# Patient Record
Sex: MALE | Race: WHITE | NOT HISPANIC OR LATINO | Employment: UNEMPLOYED | URBAN - METROPOLITAN AREA
[De-identification: names, ages, dates, MRNs, and addresses within clinical notes are randomized per-mention and may not be internally consistent; named-entity substitution may affect disease eponyms.]

---

## 2024-01-23 ENCOUNTER — OFFICE VISIT (OUTPATIENT)
Dept: UROLOGY | Facility: AMBULATORY SURGERY CENTER | Age: 59
End: 2024-01-23
Payer: COMMERCIAL

## 2024-01-23 VITALS
SYSTOLIC BLOOD PRESSURE: 142 MMHG | BODY MASS INDEX: 28.63 KG/M2 | OXYGEN SATURATION: 98 % | WEIGHT: 200 LBS | HEIGHT: 70 IN | RESPIRATION RATE: 18 BRPM | DIASTOLIC BLOOD PRESSURE: 80 MMHG | HEART RATE: 74 BPM

## 2024-01-23 DIAGNOSIS — N40.1 BENIGN PROSTATIC HYPERPLASIA WITH NOCTURIA: ICD-10-CM

## 2024-01-23 DIAGNOSIS — R35.1 BENIGN PROSTATIC HYPERPLASIA WITH NOCTURIA: ICD-10-CM

## 2024-01-23 DIAGNOSIS — R31.29 MICROSCOPIC HEMATURIA: ICD-10-CM

## 2024-01-23 DIAGNOSIS — Z12.5 SCREENING FOR PROSTATE CANCER: ICD-10-CM

## 2024-01-23 DIAGNOSIS — R35.0 URINARY FREQUENCY: Primary | ICD-10-CM

## 2024-01-23 LAB
BACTERIA UR QL AUTO: ABNORMAL /HPF
BILIRUB UR QL STRIP: NEGATIVE
CLARITY UR: CLEAR
COLOR UR: YELLOW
GLUCOSE UR STRIP-MCNC: NEGATIVE MG/DL
HGB UR QL STRIP.AUTO: NEGATIVE
HYALINE CASTS #/AREA URNS LPF: ABNORMAL /LPF
KETONES UR STRIP-MCNC: NEGATIVE MG/DL
LEUKOCYTE ESTERASE UR QL STRIP: NEGATIVE
MUCOUS THREADS UR QL AUTO: ABNORMAL
NITRITE UR QL STRIP: NEGATIVE
NON-SQ EPI CELLS URNS QL MICRO: ABNORMAL /HPF
PH UR STRIP.AUTO: 6 [PH]
POST-VOID RESIDUAL VOLUME, ML POC: 134 ML
PROT UR STRIP-MCNC: ABNORMAL MG/DL
RBC #/AREA URNS AUTO: ABNORMAL /HPF
SL AMB  POCT GLUCOSE, UA: NORMAL
SL AMB LEUKOCYTE ESTERASE,UA: NORMAL
SL AMB POCT BILIRUBIN,UA: NORMAL
SL AMB POCT BLOOD,UA: NORMAL
SL AMB POCT CLARITY,UA: CLEAR
SL AMB POCT COLOR,UA: YELLOW
SL AMB POCT KETONES,UA: NORMAL
SL AMB POCT NITRITE,UA: NORMAL
SL AMB POCT PH,UA: 5
SL AMB POCT SPECIFIC GRAVITY,UA: 1.01
SL AMB POCT URINE PROTEIN: NORMAL
SL AMB POCT UROBILINOGEN: 0.2
SP GR UR STRIP.AUTO: 1.02 (ref 1–1.03)
UROBILINOGEN UR STRIP-ACNC: <2 MG/DL
WBC #/AREA URNS AUTO: ABNORMAL /HPF

## 2024-01-23 PROCEDURE — 81002 URINALYSIS NONAUTO W/O SCOPE: CPT | Performed by: UROLOGY

## 2024-01-23 PROCEDURE — 99204 OFFICE O/P NEW MOD 45 MIN: CPT | Performed by: UROLOGY

## 2024-01-23 PROCEDURE — 51798 US URINE CAPACITY MEASURE: CPT | Performed by: UROLOGY

## 2024-01-23 PROCEDURE — 81001 URINALYSIS AUTO W/SCOPE: CPT | Performed by: UROLOGY

## 2024-01-23 RX ORDER — TAMSULOSIN HYDROCHLORIDE 0.4 MG/1
0.4 CAPSULE ORAL
Qty: 90 CAPSULE | Refills: 3 | Status: SHIPPED | OUTPATIENT
Start: 2024-01-23

## 2024-01-23 RX ORDER — LISINOPRIL 20 MG/1
20 TABLET ORAL DAILY
COMMUNITY
Start: 2023-12-18
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
## 2024-02-06 ENCOUNTER — TELEPHONE (OUTPATIENT)
Age: 59
End: 2024-02-06

## 2024-02-06 ENCOUNTER — HOSPITAL ENCOUNTER (OUTPATIENT)
Dept: ULTRASOUND IMAGING | Facility: HOSPITAL | Age: 59
Discharge: HOME | End: 2024-02-06
Attending: UROLOGY
Payer: COMMERCIAL

## 2024-02-06 DIAGNOSIS — I72.3 ILIAC ARTERY ANEURYSM (HCC): ICD-10-CM

## 2024-02-06 DIAGNOSIS — R31.29 MICROSCOPIC HEMATURIA: ICD-10-CM

## 2024-02-06 DIAGNOSIS — I71.40 ABDOMINAL AORTIC ANEURYSM (AAA) WITHOUT RUPTURE, UNSPECIFIED PART (HCC): Primary | ICD-10-CM

## 2024-02-06 PROCEDURE — 76770 US EXAM ABDO BACK WALL COMP: CPT

## 2024-02-06 NOTE — TELEPHONE ENCOUNTER
renal/bladder US reviewed. fortunately urologic results look pretty good. no stones, empties bladder well, some simple renal cysts. he should keep the f/u with [facility] for the bladder and prostate workup.    incidental and significant finding of abnormal blood vessels- aortic aneurysm adn iliac aneurysm he MUST notify his PCP and be followed for these. The pcp actually saw him yesterday ([facility]) but he is not in epic for me to send him results direct. please fax to pcp. as a second step i have referred pt to vascular surgery in [facility] for opinion. this may be something pt knows about already, but it could also be the first time, since there are no comparisons in [facility] system. thank you

## 2024-02-07 NOTE — TELEPHONE ENCOUNTER
LM for patient to call the office back about imaging.     Report faxed to PCP office as noted by Christiane.

## 2024-02-08 NOTE — TELEPHONE ENCOUNTER
Called and spoke with patient. Informed on AP's note and the importance of following up with PCP and/or vascular as he states he was never informed of this information before.

## 2024-02-08 NOTE — TELEPHONE ENCOUNTER
Patient called back stating that he received a phone call around 4 PM today asking that he contact the office. I don't see an encounter for this in the patient's chart.    Please review and return the patient's call.    Call back [phone]

## 2024-02-13 ENCOUNTER — TELEPHONE (OUTPATIENT)
Dept: UROLOGY | Facility: AMBULATORY SURGERY CENTER | Age: 59
End: 2024-02-13

## 2024-02-13 DIAGNOSIS — I71.40 ABDOMINAL AORTIC ANEURYSM (AAA), UNSPECIFIED PART, UNSPECIFIED WHETHER RUPTURED (HCC): Primary | ICD-10-CM

## 2024-02-14 ENCOUNTER — APPOINTMENT (OUTPATIENT)
Dept: LAB | Facility: HOSPITAL | Age: 59
End: 2024-02-14
Payer: COMMERCIAL

## 2024-02-14 ENCOUNTER — TELEPHONE (OUTPATIENT)
Dept: VASCULAR SURGERY | Facility: CLINIC | Age: 59
End: 2024-02-14

## 2024-02-14 DIAGNOSIS — Z12.5 SCREENING FOR PROSTATE CANCER: ICD-10-CM

## 2024-02-14 DIAGNOSIS — I71.40 ABDOMINAL AORTIC ANEURYSM (AAA) WITHOUT RUPTURE, UNSPECIFIED PART (HCC): Primary | ICD-10-CM

## 2024-02-14 DIAGNOSIS — I71.40 ABDOMINAL AORTIC ANEURYSM (AAA) WITHOUT RUPTURE, UNSPECIFIED PART (HCC): ICD-10-CM

## 2024-02-14 LAB
ANION GAP SERPL CALCULATED.3IONS-SCNC: 6 MMOL/L
BUN SERPL-MCNC: 24 MG/DL (ref 5–25)
CALCIUM SERPL-MCNC: 9.8 MG/DL (ref 8.4–10.2)
CHLORIDE SERPL-SCNC: 107 MMOL/L (ref 96–108)
CO2 SERPL-SCNC: 28 MMOL/L (ref 21–32)
CREAT SERPL-MCNC: 2.05 MG/DL (ref 0.6–1.3)
GFR SERPL CREATININE-BSD FRML MDRD: 34 ML/MIN/1.73SQ M
GLUCOSE SERPL-MCNC: 89 MG/DL (ref 65–140)
POTASSIUM SERPL-SCNC: 4.3 MMOL/L (ref 3.5–5.3)
PSA SERPL-MCNC: 3.35 NG/ML (ref 0–4)
SODIUM SERPL-SCNC: 141 MMOL/L (ref 135–147)

## 2024-02-14 PROCEDURE — 80048 BASIC METABOLIC PNL TOTAL CA: CPT

## 2024-02-14 PROCEDURE — 36415 COLL VENOUS BLD VENIPUNCTURE: CPT

## 2024-02-14 PROCEDURE — G0103 PSA SCREENING: HCPCS

## 2024-02-14 NOTE — TELEPHONE ENCOUNTER
CTA scheduled for 2/17 9 a.m. in [place].  Follow up office visit was already scheduled for 2/20 with Janet in [place].  Spoke with patient and he is aware

## 2024-02-14 NOTE — TELEPHONE ENCOUNTER
----- Message from Unique Wilson MD sent at 2/12/2024  5:47 PM EST -----  Hi Vascular Ladies,    Dr. Sheikh is going to place a vascular referral for this patient.    Would you please get him scheduled for a CTA chest/abd/pelv w/ IV contrast prior to an office visit with me?    Thanks,    nUique  ----- Message -----  From: Carlos Sheikh MD  Sent: 2/7/2024  10:12 AM EST  To: Unique Wilson MD    U/S for microscopic hematuria with incidental 7+ cm AAA.  Thank you.    FT    ----- Message -----  From: Interface, Radiology Results In  Sent: 2/6/2024  12:56 PM EST  To: Carlos Sheikh MD

## 2024-02-14 NOTE — TELEPHONE ENCOUNTER
Called and s/w pt regarding below. Informed pt he will need to get blood work prior to the CTA to see if IV hydration is needed. Pt agreeable. CTA and BMP orders placed. Pt is already scheduled with Dr. Gonzales next Tuesday, 2/20. Call was transferred to the call center to set up CTA prior to ovs. Pt stated he will go today to get blood work completed.

## 2024-02-14 NOTE — TELEPHONE ENCOUNTER
Okay, will do. Also, his BMP came back. His GFR is only 34 and creatine is 2.05. Please advise on hydration. Thanks!          Previous Messages       ----- Message -----  From: Unique Wilson MD  Sent: 2/14/2024  12:41 PM EST  To: Shelly Stern RN; *    Unless there is a location/travel issue for this patient, he should be seeing me in the office, not Dr. Gonzales.  Please change appointment.    Thanks    Unique

## 2024-02-15 DIAGNOSIS — I71.40 ABDOMINAL AORTIC ANEURYSM (AAA) WITHOUT RUPTURE, UNSPECIFIED PART (HCC): Primary | ICD-10-CM

## 2024-02-15 RX ORDER — SODIUM CHLORIDE 9 MG/ML
3 INJECTION, SOLUTION INTRAVENOUS CONTINUOUS
Status: CANCELLED | OUTPATIENT
Start: 2024-02-21

## 2024-02-15 RX ORDER — SODIUM CHLORIDE 9 MG/ML
1.25 INJECTION, SOLUTION INTRAVENOUS CONTINUOUS
Status: CANCELLED | OUTPATIENT
Start: 2024-02-21 | End: 2024-02-21

## 2024-02-15 NOTE — TELEPHONE ENCOUNTER
Received the following email from Dr. Calhoun, will place iv hydration orders per protocol and route to her to sign.

## 2024-02-15 NOTE — TELEPHONE ENCOUNTER
Called pt and explained he needs to have IV hydration w/ CTA, it will need to be r/s, [place] cannot accommodate IV hydration. Pt would like to go to [facility].  Advised I will cx CTA for 2/17 and once we have iv hydration orders signed someone will call him back to arrange. He also needs his ov changed from Dr. Patel to Dr. Calhoun. Called central scheduling and s/w Christiane and cx CTA for 2/17.  Attempted to place iv hydration orders however pt has no dx in snapshot and I am unable to complete order, contacted Yovany at help desk, he will have someone get back to me.

## 2024-02-15 NOTE — TELEPHONE ENCOUNTER
S/w STUART Bender cannot add problem, provider must. Problem added by DAYA PAZ, iv hydration orders placed and routed to Dr. Calhoun to sign. Pt will also need ov r/s w/ her once CTA/IV hydration is scheduled.

## 2024-02-15 NOTE — TELEPHONE ENCOUNTER
"IV hydration orders signed, emailed scheduling staff to schedule CTA ASAP, pt prefers [facility]. Also informed them per Dr. Calhoun \"Unless there is a location/travel issue for this patient, he should be seeing me in the office, not Dr. Gonzales.  Please change appointment\".      "

## 2024-02-21 ENCOUNTER — HOSPITAL ENCOUNTER (OUTPATIENT)
Dept: CT IMAGING | Facility: HOSPITAL | Age: 59
Discharge: HOME | End: 2024-02-21
Attending: SURGERY
Payer: COMMERCIAL

## 2024-02-21 ENCOUNTER — HOSPITAL ENCOUNTER (OUTPATIENT)
Dept: INFUSION CENTER | Facility: HOSPITAL | Age: 59
Discharge: HOME | End: 2024-02-21
Payer: COMMERCIAL

## 2024-02-21 VITALS
HEART RATE: 75 BPM | DIASTOLIC BLOOD PRESSURE: 85 MMHG | TEMPERATURE: 96.6 F | SYSTOLIC BLOOD PRESSURE: 130 MMHG | WEIGHT: 200.62 LBS | OXYGEN SATURATION: 98 % | RESPIRATION RATE: 16 BRPM | BODY MASS INDEX: 28.79 KG/M2

## 2024-02-21 DIAGNOSIS — I71.40 ABDOMINAL AORTIC ANEURYSM (AAA) WITHOUT RUPTURE, UNSPECIFIED PART (HCC): Primary | ICD-10-CM

## 2024-02-21 DIAGNOSIS — I71.40 ABDOMINAL AORTIC ANEURYSM (AAA) WITHOUT RUPTURE, UNSPECIFIED PART (HCC): ICD-10-CM

## 2024-02-21 PROCEDURE — 71275 CT ANGIOGRAPHY CHEST: CPT

## 2024-02-21 PROCEDURE — 74174 CTA ABD&PLVS W/CONTRAST: CPT

## 2024-02-21 RX ORDER — SODIUM CHLORIDE 9 MG/ML
3 INJECTION, SOLUTION INTRAVENOUS CONTINUOUS
Status: DISPENSED | OUTPATIENT
Start: 2024-02-21 | End: 2024-02-21

## 2024-02-21 RX ORDER — SODIUM CHLORIDE 9 MG/ML
3 INJECTION, SOLUTION INTRAVENOUS CONTINUOUS
Status: CANCELLED | OUTPATIENT
Start: 2024-02-21

## 2024-02-21 RX ORDER — SODIUM CHLORIDE 9 MG/ML
1.25 INJECTION, SOLUTION INTRAVENOUS CONTINUOUS
Status: DISCONTINUED | OUTPATIENT
Start: 2024-02-21 | End: 2024-02-21 | Stop reason: HOSPADM

## 2024-02-21 RX ORDER — SODIUM CHLORIDE 9 MG/ML
1.25 INJECTION, SOLUTION INTRAVENOUS CONTINUOUS
Status: CANCELLED | OUTPATIENT
Start: 2024-02-21

## 2024-02-21 RX ADMIN — IOHEXOL 80 ML: 350 INJECTION, SOLUTION INTRAVENOUS at 10:01

## 2024-02-21 RX ADMIN — SODIUM CHLORIDE 3 ML/KG/HR: 0.9 INJECTION, SOLUTION INTRAVENOUS at 08:35

## 2024-02-21 RX ADMIN — SODIUM CHLORIDE 1.25 ML/KG/HR: 0.9 INJECTION, SOLUTION INTRAVENOUS at 10:03

## 2024-02-21 NOTE — PROGRESS NOTES
Sourav Saul  tolerated treatment well with no complications.      Therapy completed  Patient has a follow up appointment with physician            XIOMY printed and given to Sourav Saul:  No (Declined by Sourav Saul)

## 2024-02-23 ENCOUNTER — TELEPHONE (OUTPATIENT)
Dept: VASCULAR SURGERY | Facility: CLINIC | Age: 59
End: 2024-02-23

## 2024-02-23 NOTE — TELEPHONE ENCOUNTER
Unique MORALES Doctor, MD  P [facility] Surgery Coordinator  Jose L Hackett,    I see this patient on 3/4/24 for his large AAA but reviewed his CTA ahead of time.    I'm planning to offer him open repair and wanted to get him a tentative spot on my schedule because I seem to have a run of difficult open repairs.  I'll let you know if I change the plan after seeing him.  He will definitely need cardiac testing as well prior to procedure    ThanksUnique    Operative Scheduling Information:    Hospital:  [facility]    Physician:  Me and Any other surgeon    Surgery: open aortic repair w/ bifurcated dacron graft; possible renal artery reimplantation    Urgency:  Standard    Level:  Level 3: Outpatients to be scheduled for elective surgery than can be delayed up to 4 weeks without reasonable expectation of detriment to patient    Case Length:  Normal + 1 hr    Post-op Bed:  ICU    OR Table:  Standard    Equipment Needs:  Cell Saver    Medication Instructions:  Aspirin:   Continue (do not hold)    Hydration:  Start on admit    Contrast Allergy:  no

## 2024-02-23 NOTE — TELEPHONE ENCOUNTER
Dr Calhoun aware of this patient referral and aneurysm size. CTA obtained for repair options. Aneurysm size is expected, measured similarly to u/s. Patient should be instructed if any acute lower back pain/abdominal pain should be evaluated in the ED.  Patient is scheduled to see Dr Calhoun 3/4/24 and specifically referred to Dr Calhoun Doctor. I would keep as scheduled per prior telephone notes. I cc'd Dr Calhoun so she is aware.

## 2024-02-23 NOTE — TELEPHONE ENCOUNTER
Received a call from Deidre at radiology that patient has a significant finding on his CTA of chest, abdomen and pelvis done on  2/21/24 .  See impression below.:         Vascular:  1.  Aneurysmal dilatation of the infrarenal abdominal aorta, measuring 72 mm. 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 Decreased opacification of the   bilateral lower poles of the kidneys indicates pre-existing renal dysfunction.  2.  Bilateral right greater than left common iliac artery aneurysms, measuring up to 33 mm.     Nonvascular:  1.  Small 5 mm right middle lobe pulmonary nodule, for which follow-up with a noncontrast chest CT in 1 year is recommended.  2.  Findings suggestive of mild respiratory bronchiolitis/smoking-related interstitial lung disease.     Consultation with vascular surgery is recommended. The study was marked in EPIC for significant notification.         Sent to vascular triage to advise  Patient has an appt with Dr. Unique Wilson on  3/4/24

## 2024-02-26 ENCOUNTER — TELEPHONE (OUTPATIENT)
Dept: VASCULAR SURGERY | Facility: CLINIC | Age: 59
End: 2024-02-26

## 2024-02-26 DIAGNOSIS — I71.40 ABDOMINAL AORTIC ANEURYSM (AAA) WITHOUT RUPTURE, UNSPECIFIED PART (HCC): Primary | ICD-10-CM

## 2024-02-26 NOTE — TELEPHONE ENCOUNTER
----- Message from Unique Wilson MD sent at 2/23/2024  4:18 PM EST -----  Jose L Hackett,    I see this patient on 3/4/24 for his large AAA but reviewed his CTA ahead of time.    I'm planning to offer him open repair and wanted to get him a tentative spot on my schedule because I seem to have a run of difficult open repairs.  I'll let you know if I change the plan after seeing him.  He will definitely need cardiac testing as well prior to procedure    Thanks,    Unique    Operative Scheduling Information:    Hospital:  [place] [facility]    Physician:  Me and Any other surgeon    Surgery: open aortic repair w/ bifurcated dacron graft; possible renal artery reimplantation    Urgency:  Standard    Level:  Level 3: Outpatients to be scheduled for elective surgery than can be delayed up to 4 weeks without reasonable expectation of detriment to patient    Case Length:  Normal + 1 hr    Post-op Bed:  ICU    OR Table:  Standard    Equipment Needs:  Cell Saver    Medication Instructions:  Aspirin:   Continue (do not hold)    Hydration:  Start on admit    Contrast Allergy:  no

## 2024-02-29 NOTE — TELEPHONE ENCOUNTER
Called [facility] for a clearance appointment and spoke w/Marisel in the call center. She advised me 4/4 is the next available date. Requested sooner due to AAA. Was transferred to clinical line, then the . I LMOM for someone to call the patient with a clearance appointment advising them OV w/Dr. Calhoun on 3/4 and pending surgery date of 3/20/24.

## 2024-03-04 ENCOUNTER — OFFICE VISIT (OUTPATIENT)
Dept: VASCULAR SURGERY | Facility: CLINIC | Age: 59
End: 2024-03-04
Payer: COMMERCIAL

## 2024-03-04 VITALS
HEART RATE: 78 BPM | WEIGHT: 200 LBS | DIASTOLIC BLOOD PRESSURE: 88 MMHG | BODY MASS INDEX: 28.63 KG/M2 | SYSTOLIC BLOOD PRESSURE: 142 MMHG | HEIGHT: 70 IN

## 2024-03-04 DIAGNOSIS — I71.40 ABDOMINAL AORTIC ANEURYSM (AAA), UNSPECIFIED PART, UNSPECIFIED WHETHER RUPTURED (HCC): Primary | ICD-10-CM

## 2024-03-04 DIAGNOSIS — R09.89 PROMINENT POPLITEAL PULSE: ICD-10-CM

## 2024-03-04 DIAGNOSIS — I72.3 ILIAC ARTERY ANEURYSM, BILATERAL (HCC): ICD-10-CM

## 2024-03-04 DIAGNOSIS — R09.89 LEFT CAROTID BRUIT: ICD-10-CM

## 2024-03-04 DIAGNOSIS — E78.5 HYPERLIPIDEMIA, UNSPECIFIED HYPERLIPIDEMIA TYPE: ICD-10-CM

## 2024-03-04 DIAGNOSIS — R79.9 ABNORMAL FINDING OF BLOOD CHEMISTRY, UNSPECIFIED: ICD-10-CM

## 2024-03-04 DIAGNOSIS — N18.32 STAGE 3B CHRONIC KIDNEY DISEASE (HCC): ICD-10-CM

## 2024-03-04 PROBLEM — N40.0 BPH (BENIGN PROSTATIC HYPERPLASIA): Status: ACTIVE | Noted: 2024-03-04

## 2024-03-04 PROBLEM — I10 HYPERTENSION: Status: ACTIVE | Noted: 2024-03-04

## 2024-03-04 PROBLEM — N18.9 CKD (CHRONIC KIDNEY DISEASE): Status: ACTIVE | Noted: 2024-03-04

## 2024-03-04 PROCEDURE — 99205 OFFICE O/P NEW HI 60 MIN: CPT | Performed by: SURGERY

## 2024-03-04 RX ORDER — ICOSAPENT ETHYL 1000 MG/1
CAPSULE ORAL EVERY 12 HOURS
COMMUNITY
Start: 2024-02-28

## 2024-03-04 RX ORDER — CHLORHEXIDINE GLUCONATE ORAL RINSE 1.2 MG/ML
15 SOLUTION DENTAL ONCE
OUTPATIENT
Start: 2024-03-04 | End: 2024-03-04

## 2024-03-04 RX ORDER — ATORVASTATIN CALCIUM 40 MG/1
40 TABLET, FILM COATED ORAL DAILY
Qty: 90 TABLET | Refills: 4 | Status: SHIPPED | OUTPATIENT
Start: 2024-03-04

## 2024-03-04 RX ORDER — SODIUM CHLORIDE 9 MG/ML
125 INJECTION, SOLUTION INTRAVENOUS CONTINUOUS
OUTPATIENT
Start: 2024-03-04

## 2024-03-04 RX ORDER — ASPIRIN 81 MG/1
81 TABLET, CHEWABLE ORAL DAILY
Start: 2024-03-04

## 2024-03-04 RX ORDER — CHLORHEXIDINE GLUCONATE ORAL RINSE 1.2 MG/ML
15 SOLUTION DENTAL EVERY 12 HOURS SCHEDULED
OUTPATIENT
Start: 2024-03-04

## 2024-03-04 RX ORDER — TADALAFIL 5 MG/1
TABLET ORAL
COMMUNITY
End: 2024-03-15 | Stop reason: ALTCHOICE
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
## 2024-03-05 ENCOUNTER — TELEPHONE (OUTPATIENT)
Dept: NEPHROLOGY | Facility: CLINIC | Age: 59
End: 2024-03-05

## 2024-03-05 NOTE — TELEPHONE ENCOUNTER
Sent patient a [employer] message to schedule a consult per referral from Dr. Unique Wilson. This is the first attempt.

## 2024-03-06 ENCOUNTER — CONSULT (OUTPATIENT)
Dept: CARDIOLOGY CLINIC | Facility: CLINIC | Age: 59
End: 2024-03-06
Payer: COMMERCIAL

## 2024-03-06 VITALS
BODY MASS INDEX: 28.63 KG/M2 | WEIGHT: 200 LBS | HEIGHT: 70 IN | DIASTOLIC BLOOD PRESSURE: 90 MMHG | HEART RATE: 70 BPM | SYSTOLIC BLOOD PRESSURE: 132 MMHG

## 2024-03-06 DIAGNOSIS — E78.2 MIXED HYPERLIPIDEMIA: ICD-10-CM

## 2024-03-06 DIAGNOSIS — I10 PRIMARY HYPERTENSION: Primary | ICD-10-CM

## 2024-03-06 DIAGNOSIS — I71.40 ABDOMINAL AORTIC ANEURYSM (AAA) WITHOUT RUPTURE, UNSPECIFIED PART (HCC): ICD-10-CM

## 2024-03-06 DIAGNOSIS — N18.32 STAGE 3B CHRONIC KIDNEY DISEASE (HCC): ICD-10-CM

## 2024-03-06 PROCEDURE — 99204 OFFICE O/P NEW MOD 45 MIN: CPT | Performed by: INTERNAL MEDICINE

## 2024-03-06 RX ORDER — ELECTROLYTES/DEXTROSE
SOLUTION, ORAL ORAL
COMMUNITY

## 2024-03-06 RX ORDER — METOPROLOL SUCCINATE 25 MG/1
25 TABLET, EXTENDED RELEASE ORAL DAILY
Qty: 90 TABLET | Refills: 3 | Status: SHIPPED | OUTPATIENT
Start: 2024-03-06

## 2024-03-06 NOTE — PROGRESS NOTES
Cardiology   Sourav Saul 58 y.o. male MRN: [id number]        Impression:  Hypertension - borderline control.  Dyslipidemia - on statin.  Abdominal aortic aneurysm - 7.5 cm.  Patient at acceptable cardiovascular risk to proceed with surgery.     Recommendations:  Start metoprolol succinate 25mg daily.  Continue remainder of medications.  Proceed with surgery.  Follow up in 4 months.       HPI: Sourav Saul is a 58 y.o. year old male with hypertension, dyslipidemia, and abdominal aortic aneurysm who presents for pre-operative risk stratification.  Has 7.5 cm AAA.  No chest pain, shortness of breath, or palpitations.  Climbs steps without difficulty.  Quit smoking.         Review of Systems   Constitutional: Negative.    HENT: Negative.     Eyes: Negative.    Respiratory:  Negative for chest tightness and shortness of breath.    Cardiovascular:  Negative for chest pain, palpitations and leg swelling.   Gastrointestinal: Negative.    Endocrine: Negative.    Genitourinary: Negative.    Musculoskeletal: Negative.    Skin: Negative.    Allergic/Immunologic: Negative.    Neurological: Negative.    Hematological: Negative.    Psychiatric/Behavioral: Negative.     All other systems reviewed and are negative.        No past medical history on file.  No past surgical history on file.  Social History     Substance and Sexual Activity   Alcohol Use Not Currently     Social History     Substance and Sexual Activity   Drug Use Not Currently     Social History     Tobacco Use   Smoking Status Every Day    Current packs/day: 0.50    Types: Cigarettes   Smokeless Tobacco Current     Family History   Problem Relation Age of Onset    Hypertension Father     Heart attack Father     Hypertension Mother        Allergies:  No Known Allergies    Medications:     Current Outpatient Medications:     aspirin 81 mg chewable tablet, Chew 1 tablet (81 mg total) daily, Disp: , Rfl:     atorvastatin (LIPITOR) 40 mg tablet, Take 1 tablet (40  mg total) by mouth daily, Disp: 90 tablet, Rfl: 4    lisinopril (ZESTRIL) 20 mg tablet, Take 20 mg by mouth daily, Disp: , Rfl:     Multiple Vitamin (Multivitamin Adult) TABS, Take by mouth, Disp: , Rfl:     tadalafil (CIALIS) 5 MG tablet, Take 1 tablet every day by oral route. Pt states he s not on this medication, Disp: , Rfl:     tamsulosin (FLOMAX) 0.4 mg, Take 1 capsule (0.4 mg total) by mouth daily with dinner, Disp: 90 capsule, Rfl: 3    Vascepa 1 g CAPS, Every 12 hours, Disp: , Rfl:       Wt Readings from Last 3 Encounters:   03/06/24 90.7 kg (200 lb)   03/04/24 90.7 kg (200 lb)   02/21/24 91 kg (200 lb 9.9 oz)     Temp Readings from Last 3 Encounters:   02/21/24 (!) 96.6 °F (35.9 °C) (Temporal)     BP Readings from Last 3 Encounters:   03/06/24 132/90   03/04/24 142/88   02/21/24 130/85     Pulse Readings from Last 3 Encounters:   03/06/24 70   03/04/24 78   02/21/24 75         Physical Exam  HENT:      Head: Atraumatic.      Mouth/Throat:      Mouth: Mucous membranes are moist.   Eyes:      Comments: Disconjugate gaze   Cardiovascular:      Rate and Rhythm: Normal rate and regular rhythm.      Heart sounds: Normal heart sounds.   Pulmonary:      Effort: Pulmonary effort is normal.      Breath sounds: Normal breath sounds.   Abdominal:      General: Abdomen is flat.   Musculoskeletal:         General: Normal range of motion.      Cervical back: Normal range of motion.   Skin:     General: Skin is warm.   Neurological:      General: No focal deficit present.      Mental Status: He is alert and oriented to person, place, and time.   Psychiatric:         Mood and Affect: Mood normal.         Behavior: Behavior normal.           Laboratory Studies:  CMP:  Lab Results   Component Value Date    K 4.3 02/14/2024     02/14/2024    CO2 28 02/14/2024    BUN 24 02/14/2024    CREATININE 2.05 (H) 02/14/2024    EGFR 34 02/14/2024         Cardiac testing:   EKG reviewed personally: ALBERTA TORRESI  .

## 2024-03-06 NOTE — PATIENT INSTRUCTIONS
Recommendations:  Start metoprolol succinate 25mg daily.  Continue remainder of medications.  Proceed with surgery.  Follow up in 4 months.

## 2024-03-07 ENCOUNTER — TELEPHONE (OUTPATIENT)
Dept: NEPHROLOGY | Facility: CLINIC | Age: 59
End: 2024-03-07

## 2024-03-07 NOTE — TELEPHONE ENCOUNTER
New Patient Intake Form   Patient Details   Sourav Saul     1965     [id number]     Insurance Information   Name of Insurance Company [employer]    Does the patient need an insurance referral? no   If patient has VA insurance, please ask if they will be using their VA insurance.   Appointment Information   Who is calling to schedule?  If not patient, what is callers name? Patient   Referring Provider  Dr. Unique Wilson   Reason for Appt (Diagnosis) N18.32 (ICD-10-CM) - Stage 3b chronic kidney disease (HCC)    Does Patient have labs/urine done at [facility]?  If not, where do they go?  List the date of last lab / urine  *Please try to get labs 2 years back if not at  Yes   Has patient been hospitalized recently?  If yes, list name and location of hospital they were in no   Has patient been seen by a Nephrologist before?  If yes, list name, location and phone number no   Has the patient had renal imaging done?  If so, list the most recent date and type of imaging Yes  US Kidney [facility]    Does patient have a history of Kidney Stones? no   Appointment Details   Is there a referral on file? yes    Appointment Date 5/8/24    Location  [place]- pt aware   Miscellaneous

## 2024-03-12 ENCOUNTER — APPOINTMENT (OUTPATIENT)
Dept: LAB | Facility: HOSPITAL | Age: 59
End: 2024-03-12
Payer: COMMERCIAL

## 2024-03-12 ENCOUNTER — LAB REQUISITION (OUTPATIENT)
Dept: LAB | Facility: HOSPITAL | Age: 59
End: 2024-03-12
Payer: COMMERCIAL

## 2024-03-12 DIAGNOSIS — I71.40 ABDOMINAL AORTIC ANEURYSM (AAA), UNSPECIFIED PART, UNSPECIFIED WHETHER RUPTURED (HCC): ICD-10-CM

## 2024-03-12 DIAGNOSIS — I71.40 ABDOMINAL AORTIC ANEURYSM, WITHOUT RUPTURE, UNSPECIFIED (HCC): ICD-10-CM

## 2024-03-12 DIAGNOSIS — R79.9 ABNORMAL FINDING OF BLOOD CHEMISTRY, UNSPECIFIED: ICD-10-CM

## 2024-03-12 DIAGNOSIS — E78.5 HYPERLIPIDEMIA, UNSPECIFIED HYPERLIPIDEMIA TYPE: ICD-10-CM

## 2024-03-12 LAB
ABO GROUP BLD: NORMAL
ANION GAP SERPL CALCULATED.3IONS-SCNC: 8 MMOL/L (ref 4–13)
BLD GP AB SCN SERPL QL: NEGATIVE
BUN SERPL-MCNC: 21 MG/DL (ref 5–25)
CALCIUM SERPL-MCNC: 9.1 MG/DL (ref 8.4–10.2)
CHLORIDE SERPL-SCNC: 107 MMOL/L (ref 96–108)
CHOLEST SERPL-MCNC: 117 MG/DL
CO2 SERPL-SCNC: 26 MMOL/L (ref 21–32)
CREAT SERPL-MCNC: 1.98 MG/DL (ref 0.6–1.3)
ERYTHROCYTE [DISTWIDTH] IN BLOOD BY AUTOMATED COUNT: 13.1 % (ref 11.6–15.1)
EST. AVERAGE GLUCOSE BLD GHB EST-MCNC: 120 MG/DL
GFR SERPL CREATININE-BSD FRML MDRD: 36 ML/MIN/1.73SQ M
GLUCOSE P FAST SERPL-MCNC: 108 MG/DL (ref 65–99)
HBA1C MFR BLD: 5.8 %
HCT VFR BLD AUTO: 45.1 % (ref 36.5–49.3)
HDLC SERPL-MCNC: 24 MG/DL
HGB BLD-MCNC: 14.9 G/DL (ref 12–17)
INR PPP: 0.98 (ref 0.84–1.19)
LDLC SERPL CALC-MCNC: 55 MG/DL (ref 0–100)
MCH RBC QN AUTO: 28.7 PG (ref 26.8–34.3)
MCHC RBC AUTO-ENTMCNC: 33 G/DL (ref 31.4–37.4)
MCV RBC AUTO: 87 FL (ref 82–98)
NONHDLC SERPL-MCNC: 93 MG/DL
PLATELET # BLD AUTO: 294 THOUSANDS/UL (ref 149–390)
PMV BLD AUTO: 10.5 FL (ref 8.9–12.7)
POTASSIUM SERPL-SCNC: 4 MMOL/L (ref 3.5–5.3)
PROTHROMBIN TIME: 13.4 SECONDS (ref 11.6–14.5)
RBC # BLD AUTO: 5.19 MILLION/UL (ref 3.88–5.62)
RH BLD: POSITIVE
SODIUM SERPL-SCNC: 141 MMOL/L (ref 135–147)
SPECIMEN EXPIRATION DATE: NORMAL
TRIGL SERPL-MCNC: 189 MG/DL
WBC # BLD AUTO: 12.84 THOUSAND/UL (ref 4.31–10.16)

## 2024-03-12 PROCEDURE — 86900 BLOOD TYPING SEROLOGIC ABO: CPT | Performed by: SURGERY

## 2024-03-12 PROCEDURE — 85027 COMPLETE CBC AUTOMATED: CPT

## 2024-03-12 PROCEDURE — 83036 HEMOGLOBIN GLYCOSYLATED A1C: CPT

## 2024-03-12 PROCEDURE — 80061 LIPID PANEL: CPT

## 2024-03-12 PROCEDURE — 85610 PROTHROMBIN TIME: CPT

## 2024-03-12 PROCEDURE — 80048 BASIC METABOLIC PNL TOTAL CA: CPT

## 2024-03-12 PROCEDURE — 86901 BLOOD TYPING SEROLOGIC RH(D): CPT | Performed by: SURGERY

## 2024-03-12 PROCEDURE — 86850 RBC ANTIBODY SCREEN: CPT | Performed by: SURGERY

## 2024-03-12 PROCEDURE — 36415 COLL VENOUS BLD VENIPUNCTURE: CPT

## 2024-03-14 ENCOUNTER — TELEPHONE (OUTPATIENT)
Age: 59
End: 2024-03-14

## 2024-03-14 DIAGNOSIS — R35.1 BENIGN PROSTATIC HYPERPLASIA WITH NOCTURIA: ICD-10-CM

## 2024-03-14 DIAGNOSIS — N40.1 BENIGN PROSTATIC HYPERPLASIA WITH NOCTURIA: ICD-10-CM

## 2024-03-14 RX ORDER — TAMSULOSIN HYDROCHLORIDE 0.4 MG/1
0.8 CAPSULE ORAL
Qty: 180 CAPSULE | Refills: 3 | Status: SHIPPED | OUTPATIENT
Start: 2024-03-14

## 2024-03-14 NOTE — TELEPHONE ENCOUNTER
New order for Flomax 0.8 mg was given.  This was sent to the [facility] on [address]

## 2024-03-14 NOTE — TELEPHONE ENCOUNTER
Patient called the RX Refill Line. Message is being forwarded to the office.     Patient is requesting  a new script to be written for his Flomax he was told by his PCP to start taking 2 capsules daily and see's improvmant.      Please contact patient at [phone]

## 2024-03-15 ENCOUNTER — ANESTHESIA EVENT (OUTPATIENT)
Dept: PERIOP | Facility: HOSPITAL | Age: 59
DRG: 270 | End: 2024-03-15
Payer: COMMERCIAL

## 2024-03-15 NOTE — PRE-PROCEDURE INSTRUCTIONS
Pre-Surgery Instructions:   Medication Instructions    aspirin 81 mg chewable tablet Take day of surgery.    atorvastatin (LIPITOR) 40 mg tablet Take night before surgery    lisinopril (ZESTRIL) 20 mg tablet Stop taking 1 day prior to surgery.    metoprolol succinate (TOPROL-XL) 25 mg 24 hr tablet Take day of surgery.    Multiple Vitamin (Multivitamin Adult) TABS Stop taking 7 days prior to surgery.    tamsulosin (FLOMAX) 0.4 mg Take night before surgery    Vascepa 1 g CAPS Stop taking 7 days prior to surgery.    Medication instructions for day surgery reviewed. Please use only a sip of water to take your instructed medications. Avoid all over the counter vitamins, supplements and NSAIDS for one week prior to surgery per anesthesia guidelines. Tylenol is ok to take as needed. Inst by surgeon to continue aspirin .      You will receive a call one business day prior to surgery with an arrival time and hospital directions. If your surgery is scheduled on a Monday, the hospital will be calling you on the Friday prior to your surgery. If you have not heard from anyone by 8pm, please call the hospital supervisor through the hospital  at [phone]. ([place] [phone] or [place] [phone]).    Do not eat or drink anything after midnight the night before your surgery, including candy, mints, lifesavers, or chewing gum. Do not drink alcohol 24hrs before your surgery. Try not to smoke at least 24hrs before your surgery.       Follow the pre surgery showering instructions as listed in the “My Surgical Experience Booklet” or otherwise provided by your surgeon's office. Do not use a blade to shave the surgical area 1 week before surgery. It is okay to use a clean electric clippers up to 24 hours before surgery. Do not apply any lotions, creams, including makeup, cologne, deodorant, or perfumes after showering on the day of your surgery. Do not use dry shampoo, hair spray, hair gel, or any type of hair products.      No contact lenses, eye make-up, or artificial eyelashes. Remove nail polish, including gel polish, and any artificial, gel, or acrylic nails if possible. Remove all jewelry including rings and body piercing jewelry.     Wear causal clothing that is easy to take on and off. Consider your type of surgery.    Keep any valuables, jewelry, piercings at home. Please bring any specially ordered equipment (sling, braces) if indicated.    Arrange for a responsible person to drive you to and from the hospital on the day of your surgery. Please confirm the visitor policy for the day of your procedure when you receive your phone call with an arrival time.     Call the surgeon's office with any new illnesses, exposures, or additional questions prior to surgery.    Please reference your “My Surgical Experience Booklet” for additional information to prepare for your upcoming surgery.

## 2024-03-20 NOTE — TELEPHONE ENCOUNTER
Patient calling in stating Flomax is no longer working for him. He is still getting up several times a night. He is wondering if there are any other options. Please review and call patient.

## 2024-03-20 NOTE — TELEPHONE ENCOUNTER
Called Ed back and notified him that it takes a couple weeks for medication to be come effective.  He states he wakes up every 2 hours to urinate. He states he is having surgery in 2 weeks for an aneurysm and is concerned that he won't be able to get up to urinate.    Again stated that he should give medication a little more time and not drink his tea after dinner

## 2024-04-04 NOTE — ANESTHESIA PREPROCEDURE EVALUATION
Procedure:  Open aorta and iliac aneurysm repair (Abdomen)    Relevant Problems   ANESTHESIA (within normal limits)      CARDIO   (+) Abdominal aortic aneurysm (AAA) (HCC)   (+) Hyperlipidemia   (+) Hypertension   (+) Iliac artery aneurysm, bilateral (HCC)      /RENAL   (+) BPH (benign prostatic hyperplasia)   (+) CKD (chronic kidney disease)        Physical Exam    Airway    Mallampati score: II  TM Distance: >3 FB  Neck ROM: full     Dental   Comment: Chipped teeth. No loose     Cardiovascular  Rhythm: regular, Rate: normal, Cardiovascular exam normal    Pulmonary  Pulmonary exam normal Breath sounds clear to auscultation    Other Findings        Anesthesia Plan  ASA Score- 3     Anesthesia Type- general with ASA Monitors.         Additional Monitors: arterial line and central venous line.    Airway Plan: ETT.           Plan Factors-Exercise tolerance (METS): >4 METS.    Chart reviewed.   Existing labs reviewed. Patient summary reviewed.    Patient is not a current smoker.      Obstructive sleep apnea risk education given perioperatively.        Induction- intravenous.    Postoperative Plan- Plan for postoperative opioid use. Planned trial extubation    Informed Consent- Anesthetic plan and risks discussed with patient.  I personally reviewed this patient with the CRNA. Discussed and agreed on the Anesthesia Plan with the CRNA..

## 2024-04-05 ENCOUNTER — APPOINTMENT (INPATIENT)
Dept: RADIOLOGY | Facility: HOSPITAL | Age: 59
DRG: 270 | End: 2024-04-05
Payer: COMMERCIAL

## 2024-04-05 ENCOUNTER — ANESTHESIA (OUTPATIENT)
Dept: PERIOP | Facility: HOSPITAL | Age: 59
DRG: 270 | End: 2024-04-05
Payer: COMMERCIAL

## 2024-04-05 ENCOUNTER — HOSPITAL ENCOUNTER (INPATIENT)
Facility: HOSPITAL | Age: 59
LOS: 6 days | Discharge: HOME HEALTH | DRG: 270 | End: 2024-04-11
Attending: SURGERY | Admitting: SURGERY
Payer: COMMERCIAL

## 2024-04-05 DIAGNOSIS — I71.40 ABDOMINAL AORTIC ANEURYSM (AAA) WITHOUT RUPTURE, UNSPECIFIED PART (HCC): ICD-10-CM

## 2024-04-05 DIAGNOSIS — N18.32 STAGE 3B CHRONIC KIDNEY DISEASE (HCC): Primary | ICD-10-CM

## 2024-04-05 LAB
ABO GROUP BLD: NORMAL
ANION GAP SERPL CALCULATED.3IONS-SCNC: 11 MMOL/L (ref 4–13)
ATRIAL RATE: 85 BPM
BASE EXCESS BLDA CALC-SCNC: -3 MMOL/L (ref -2–3)
BASE EXCESS BLDA CALC-SCNC: -4 MMOL/L (ref -2–3)
BASE EXCESS BLDA CALC-SCNC: -5 MMOL/L (ref -2–3)
BASE EXCESS BLDA CALC-SCNC: -6 MMOL/L (ref -2–3)
BASE EXCESS BLDA CALC-SCNC: -6.4 MMOL/L
BASE EXCESS BLDA CALC-SCNC: -7 MMOL/L (ref -2–3)
BASE EXCESS BLDA CALC-SCNC: -9 MMOL/L (ref -2–3)
BASOPHILS # BLD AUTO: 0.07 THOUSANDS/ÂΜL (ref 0–0.1)
BASOPHILS NFR BLD AUTO: 0 % (ref 0–1)
BODY TEMPERATURE: 98.4 DEGREES FEHRENHEIT
BUN SERPL-MCNC: 24 MG/DL (ref 5–25)
CA-I BLD-SCNC: 1.15 MMOL/L (ref 1.12–1.32)
CA-I BLD-SCNC: 1.15 MMOL/L (ref 1.12–1.32)
CA-I BLD-SCNC: 1.24 MMOL/L (ref 1.12–1.32)
CA-I BLD-SCNC: 1.26 MMOL/L (ref 1.12–1.32)
CA-I BLD-SCNC: 1.28 MMOL/L (ref 1.12–1.32)
CA-I BLD-SCNC: 1.3 MMOL/L (ref 1.12–1.32)
CA-I BLD-SCNC: 1.45 MMOL/L (ref 1.12–1.32)
CALCIUM SERPL-MCNC: 8 MG/DL (ref 8.4–10.2)
CHLORIDE SERPL-SCNC: 113 MMOL/L (ref 96–108)
CO2 SERPL-SCNC: 18 MMOL/L (ref 21–32)
CREAT SERPL-MCNC: 2.15 MG/DL (ref 0.6–1.3)
EOSINOPHIL # BLD AUTO: 0.03 THOUSAND/ÂΜL (ref 0–0.61)
EOSINOPHIL NFR BLD AUTO: 0 % (ref 0–6)
ERYTHROCYTE [DISTWIDTH] IN BLOOD BY AUTOMATED COUNT: 13.2 % (ref 11.6–15.1)
GFR SERPL CREATININE-BSD FRML MDRD: 32 ML/MIN/1.73SQ M
GLUCOSE SERPL-MCNC: 114 MG/DL (ref 65–140)
GLUCOSE SERPL-MCNC: 122 MG/DL (ref 65–140)
GLUCOSE SERPL-MCNC: 124 MG/DL (ref 65–140)
GLUCOSE SERPL-MCNC: 127 MG/DL (ref 65–140)
GLUCOSE SERPL-MCNC: 130 MG/DL (ref 65–140)
GLUCOSE SERPL-MCNC: 131 MG/DL (ref 65–140)
GLUCOSE SERPL-MCNC: 134 MG/DL (ref 65–140)
GLUCOSE SERPL-MCNC: 141 MG/DL (ref 65–140)
GLUCOSE SERPL-MCNC: 145 MG/DL (ref 65–140)
HCO3 BLDA-SCNC: 17.8 MMOL/L (ref 22–28)
HCO3 BLDA-SCNC: 19 MMOL/L (ref 22–28)
HCO3 BLDA-SCNC: 19.9 MMOL/L (ref 22–28)
HCO3 BLDA-SCNC: 21.2 MMOL/L (ref 22–28)
HCO3 BLDA-SCNC: 23.2 MMOL/L (ref 22–28)
HCT VFR BLD AUTO: 35.7 % (ref 36.5–49.3)
HCT VFR BLD CALC: 24 % (ref 36.5–49.3)
HCT VFR BLD CALC: 25 % (ref 36.5–49.3)
HCT VFR BLD CALC: 26 % (ref 36.5–49.3)
HCT VFR BLD CALC: 30 % (ref 36.5–49.3)
HCT VFR BLD CALC: 34 % (ref 36.5–49.3)
HCT VFR BLD CALC: 38 % (ref 36.5–49.3)
HGB BLD-MCNC: 12.2 G/DL (ref 12–17)
HGB BLDA-MCNC: 10.2 G/DL (ref 12–17)
HGB BLDA-MCNC: 11.6 G/DL (ref 12–17)
HGB BLDA-MCNC: 12.9 G/DL (ref 12–17)
HGB BLDA-MCNC: 8.2 G/DL (ref 12–17)
HGB BLDA-MCNC: 8.5 G/DL (ref 12–17)
HGB BLDA-MCNC: 8.8 G/DL (ref 12–17)
IMM GRANULOCYTES # BLD AUTO: 0.29 THOUSAND/UL (ref 0–0.2)
IMM GRANULOCYTES NFR BLD AUTO: 1 % (ref 0–2)
INR PPP: 1.33 (ref 0.84–1.19)
KCT BLD-ACNC: 188 SEC (ref 89–137)
KCT BLD-ACNC: 199 SEC (ref 89–137)
KCT BLD-ACNC: 199 SEC (ref 89–137)
KCT BLD-ACNC: 205 SEC (ref 89–137)
KCT BLD-ACNC: 222 SEC (ref 89–137)
LACTATE SERPL-SCNC: 3.2 MMOL/L (ref 0.5–2)
LACTATE SERPL-SCNC: 4.6 MMOL/L (ref 0.5–2)
LYMPHOCYTES # BLD AUTO: 1.76 THOUSANDS/ÂΜL (ref 0.6–4.47)
LYMPHOCYTES NFR BLD AUTO: 8 % (ref 14–44)
MAGNESIUM SERPL-MCNC: 1.3 MG/DL (ref 1.9–2.7)
MCH RBC QN AUTO: 29.4 PG (ref 26.8–34.3)
MCHC RBC AUTO-ENTMCNC: 34.2 G/DL (ref 31.4–37.4)
MCV RBC AUTO: 86 FL (ref 82–98)
MONOCYTES # BLD AUTO: 1.25 THOUSAND/ÂΜL (ref 0.17–1.22)
MONOCYTES NFR BLD AUTO: 6 % (ref 4–12)
NEUTROPHILS # BLD AUTO: 18.34 THOUSANDS/ÂΜL (ref 1.85–7.62)
NEUTS SEG NFR BLD AUTO: 85 % (ref 43–75)
NON VENT TYPE-AEROSOL MASK: ABNORMAL
NRBC BLD AUTO-RTO: 0 /100 WBCS
O2 CT BLDA-SCNC: 17.6 ML/DL (ref 16–23)
OXYHGB MFR BLDA: 96.8 % (ref 94–97)
P AXIS: 34 DEGREES
PCO2 BLD: 19 MMOL/L (ref 21–32)
PCO2 BLD: 21 MMOL/L (ref 21–32)
PCO2 BLD: 22 MMOL/L (ref 21–32)
PCO2 BLD: 25 MMOL/L (ref 21–32)
PCO2 BLD: 33.2 MM HG (ref 36–44)
PCO2 BLD: 38.1 MM HG (ref 36–44)
PCO2 BLD: 39 MM HG (ref 36–44)
PCO2 BLD: 39 MM HG (ref 36–44)
PCO2 BLD: 43 MM HG (ref 36–44)
PCO2 BLD: 44.8 MM HG (ref 36–44)
PCO2 BLDA: 37.8 MM HG (ref 36–44)
PCO2 TEMP ADJ BLDA: 37.6 MM HG (ref 36–44)
PH BLD: 7.27 [PH] (ref 7.35–7.45)
PH BLD: 7.27 [PH] (ref 7.35–7.45)
PH BLD: 7.32 [PH] (ref 7.35–7.45)
PH BLD: 7.35 [PH] (ref 7.35–7.45)
PH BLD: 7.39 [PH] (ref 7.35–7.45)
PH BLDA: 7.32 [PH] (ref 7.35–7.45)
PHOSPHATE SERPL-MCNC: 4.1 MG/DL (ref 2.7–4.5)
PLATELET # BLD AUTO: 171 THOUSANDS/UL (ref 149–390)
PMV BLD AUTO: 9.9 FL (ref 8.9–12.7)
PO2 BLD: 128.2 MM HG (ref 75–129)
PO2 BLD: 130 MM HG (ref 75–129)
PO2 BLD: 197 MM HG (ref 75–129)
PO2 BLD: 202 MM HG (ref 75–129)
PO2 BLD: 202 MM HG (ref 75–129)
PO2 BLD: 209 MM HG (ref 75–129)
PO2 BLD: 211 MM HG (ref 75–129)
PO2 BLDA: 128.8 MM HG (ref 75–129)
POTASSIUM BLD-SCNC: 4.2 MMOL/L (ref 3.5–5.3)
POTASSIUM BLD-SCNC: 4.5 MMOL/L (ref 3.5–5.3)
POTASSIUM BLD-SCNC: 4.8 MMOL/L (ref 3.5–5.3)
POTASSIUM BLD-SCNC: 4.8 MMOL/L (ref 3.5–5.3)
POTASSIUM BLD-SCNC: 5 MMOL/L (ref 3.5–5.3)
POTASSIUM BLD-SCNC: 5.1 MMOL/L (ref 3.5–5.3)
POTASSIUM SERPL-SCNC: 5 MMOL/L (ref 3.5–5.3)
PR INTERVAL: 150 MS
PROTHROMBIN TIME: 16.3 SECONDS (ref 11.6–14.5)
QRS AXIS: 69 DEGREES
QRSD INTERVAL: 92 MS
QT INTERVAL: 346 MS
QTC INTERVAL: 412 MS
RBC # BLD AUTO: 4.15 MILLION/UL (ref 3.88–5.62)
RH BLD: POSITIVE
SAO2 % BLD FROM PO2: 100 % (ref 60–85)
SAO2 % BLD FROM PO2: 99 % (ref 60–85)
SODIUM BLD-SCNC: 139 MMOL/L (ref 136–145)
SODIUM BLD-SCNC: 139 MMOL/L (ref 136–145)
SODIUM BLD-SCNC: 141 MMOL/L (ref 136–145)
SODIUM SERPL-SCNC: 142 MMOL/L (ref 135–147)
SPECIMEN SOURCE: ABNORMAL
T WAVE AXIS: 45 DEGREES
VENTRICULAR RATE: 85 BPM
WBC # BLD AUTO: 21.74 THOUSAND/UL (ref 4.31–10.16)

## 2024-04-05 PROCEDURE — 84132 ASSAY OF SERUM POTASSIUM: CPT

## 2024-04-05 PROCEDURE — 85610 PROTHROMBIN TIME: CPT | Performed by: STUDENT IN AN ORGANIZED HEALTH CARE EDUCATION/TRAINING PROGRAM

## 2024-04-05 PROCEDURE — 35091 REPAIR DEFECT OF ARTERY: CPT | Performed by: SURGERY

## 2024-04-05 PROCEDURE — 86923 COMPATIBILITY TEST ELECTRIC: CPT

## 2024-04-05 PROCEDURE — 93010 ELECTROCARDIOGRAM REPORT: CPT | Performed by: INTERNAL MEDICINE

## 2024-04-05 PROCEDURE — 85347 COAGULATION TIME ACTIVATED: CPT

## 2024-04-05 PROCEDURE — NC001 PR NO CHARGE: Performed by: SURGERY

## 2024-04-05 PROCEDURE — P9016 RBC LEUKOCYTES REDUCED: HCPCS

## 2024-04-05 PROCEDURE — 83735 ASSAY OF MAGNESIUM: CPT | Performed by: STUDENT IN AN ORGANIZED HEALTH CARE EDUCATION/TRAINING PROGRAM

## 2024-04-05 PROCEDURE — 82947 ASSAY GLUCOSE BLOOD QUANT: CPT

## 2024-04-05 PROCEDURE — 04VD0EZ RESTRICTION OF LEFT COMMON ILIAC ARTERY WITH BRANCHED OR FENESTRATED INTRALUMINAL DEVICE, ONE OR TWO ARTERIES, OPEN APPROACH: ICD-10-PCS | Performed by: SURGERY

## 2024-04-05 PROCEDURE — 82803 BLOOD GASES ANY COMBINATION: CPT

## 2024-04-05 PROCEDURE — 99024 POSTOP FOLLOW-UP VISIT: CPT | Performed by: PHYSICIAN ASSISTANT

## 2024-04-05 PROCEDURE — 84100 ASSAY OF PHOSPHORUS: CPT | Performed by: STUDENT IN AN ORGANIZED HEALTH CARE EDUCATION/TRAINING PROGRAM

## 2024-04-05 PROCEDURE — 93005 ELECTROCARDIOGRAM TRACING: CPT

## 2024-04-05 PROCEDURE — C9113 INJ PANTOPRAZOLE SODIUM, VIA: HCPCS | Performed by: PHYSICIAN ASSISTANT

## 2024-04-05 PROCEDURE — 85014 HEMATOCRIT: CPT

## 2024-04-05 PROCEDURE — C2628 CATHETER, OCCLUSION: HCPCS | Performed by: SURGERY

## 2024-04-05 PROCEDURE — 30233N0 TRANSFUSION OF AUTOLOGOUS RED BLOOD CELLS INTO PERIPHERAL VEIN, PERCUTANEOUS APPROACH: ICD-10-PCS | Performed by: ANESTHESIOLOGY

## 2024-04-05 PROCEDURE — 85025 COMPLETE CBC W/AUTO DIFF WBC: CPT | Performed by: STUDENT IN AN ORGANIZED HEALTH CARE EDUCATION/TRAINING PROGRAM

## 2024-04-05 PROCEDURE — 83605 ASSAY OF LACTIC ACID: CPT

## 2024-04-05 PROCEDURE — 83605 ASSAY OF LACTIC ACID: CPT | Performed by: STUDENT IN AN ORGANIZED HEALTH CARE EDUCATION/TRAINING PROGRAM

## 2024-04-05 PROCEDURE — 30233N1 TRANSFUSION OF NONAUTOLOGOUS RED BLOOD CELLS INTO PERIPHERAL VEIN, PERCUTANEOUS APPROACH: ICD-10-PCS | Performed by: ANESTHESIOLOGY

## 2024-04-05 PROCEDURE — 82805 BLOOD GASES W/O2 SATURATION: CPT | Performed by: STUDENT IN AN ORGANIZED HEALTH CARE EDUCATION/TRAINING PROGRAM

## 2024-04-05 PROCEDURE — 70450 CT HEAD/BRAIN W/O DYE: CPT

## 2024-04-05 PROCEDURE — 82330 ASSAY OF CALCIUM: CPT | Performed by: PHYSICIAN ASSISTANT

## 2024-04-05 PROCEDURE — 04VC0EZ RESTRICTION OF RIGHT COMMON ILIAC ARTERY WITH BRANCHED OR FENESTRATED INTRALUMINAL DEVICE, ONE OR TWO ARTERIES, OPEN APPROACH: ICD-10-PCS | Performed by: SURGERY

## 2024-04-05 PROCEDURE — 82330 ASSAY OF CALCIUM: CPT

## 2024-04-05 PROCEDURE — 82948 REAGENT STRIP/BLOOD GLUCOSE: CPT

## 2024-04-05 PROCEDURE — P9017 PLASMA 1 DONOR FRZ W/IN 8 HR: HCPCS

## 2024-04-05 PROCEDURE — 80048 BASIC METABOLIC PNL TOTAL CA: CPT | Performed by: STUDENT IN AN ORGANIZED HEALTH CARE EDUCATION/TRAINING PROGRAM

## 2024-04-05 PROCEDURE — 04V00DZ RESTRICTION OF ABDOMINAL AORTA WITH INTRALUMINAL DEVICE, OPEN APPROACH: ICD-10-PCS | Performed by: SURGERY

## 2024-04-05 PROCEDURE — 84295 ASSAY OF SERUM SODIUM: CPT

## 2024-04-05 PROCEDURE — 30233K1 TRANSFUSION OF NONAUTOLOGOUS FROZEN PLASMA INTO PERIPHERAL VEIN, PERCUTANEOUS APPROACH: ICD-10-PCS | Performed by: ANESTHESIOLOGY

## 2024-04-05 PROCEDURE — 71045 X-RAY EXAM CHEST 1 VIEW: CPT

## 2024-04-05 RX ORDER — SODIUM CHLORIDE, SODIUM LACTATE, POTASSIUM CHLORIDE, CALCIUM CHLORIDE 600; 310; 30; 20 MG/100ML; MG/100ML; MG/100ML; MG/100ML
INJECTION, SOLUTION INTRAVENOUS CONTINUOUS PRN
Status: DISCONTINUED | OUTPATIENT
Start: 2024-04-05 | End: 2024-04-05

## 2024-04-05 RX ORDER — METOPROLOL TARTRATE 1 MG/ML
12.5 INJECTION, SOLUTION INTRAVENOUS EVERY 12 HOURS
Status: DISCONTINUED | OUTPATIENT
Start: 2024-04-05 | End: 2024-04-05

## 2024-04-05 RX ORDER — CALCIUM CHLORIDE 100 MG/ML
INJECTION INTRAVENOUS; INTRAVENTRICULAR AS NEEDED
Status: DISCONTINUED | OUTPATIENT
Start: 2024-04-05 | End: 2024-04-05

## 2024-04-05 RX ORDER — FAMOTIDINE 10 MG/ML
20 INJECTION, SOLUTION INTRAVENOUS EVERY 12 HOURS SCHEDULED
Status: DISCONTINUED | OUTPATIENT
Start: 2024-04-05 | End: 2024-04-08

## 2024-04-05 RX ORDER — MIDAZOLAM HYDROCHLORIDE 2 MG/2ML
INJECTION, SOLUTION INTRAMUSCULAR; INTRAVENOUS AS NEEDED
Status: DISCONTINUED | OUTPATIENT
Start: 2024-04-05 | End: 2024-04-05

## 2024-04-05 RX ORDER — HYDRALAZINE HYDROCHLORIDE 20 MG/ML
5 INJECTION INTRAMUSCULAR; INTRAVENOUS
Status: DISCONTINUED | OUTPATIENT
Start: 2024-04-05 | End: 2024-04-05 | Stop reason: HOSPADM

## 2024-04-05 RX ORDER — PROPOFOL 10 MG/ML
INJECTION, EMULSION INTRAVENOUS AS NEEDED
Status: DISCONTINUED | OUTPATIENT
Start: 2024-04-05 | End: 2024-04-05

## 2024-04-05 RX ORDER — ACETAMINOPHEN 325 MG/1
650 TABLET ORAL EVERY 6 HOURS
Status: DISCONTINUED | OUTPATIENT
Start: 2024-04-05 | End: 2024-04-05

## 2024-04-05 RX ORDER — ACETAMINOPHEN 10 MG/ML
1000 INJECTION, SOLUTION INTRAVENOUS EVERY 8 HOURS SCHEDULED
Status: DISCONTINUED | OUTPATIENT
Start: 2024-04-05 | End: 2024-04-07

## 2024-04-05 RX ORDER — SODIUM CHLORIDE, SODIUM LACTATE, POTASSIUM CHLORIDE, CALCIUM CHLORIDE 600; 310; 30; 20 MG/100ML; MG/100ML; MG/100ML; MG/100ML
125 INJECTION, SOLUTION INTRAVENOUS CONTINUOUS
Status: DISCONTINUED | OUTPATIENT
Start: 2024-04-05 | End: 2024-04-06

## 2024-04-05 RX ORDER — LIDOCAINE 50 MG/G
2 PATCH TOPICAL DAILY
Status: DISCONTINUED | OUTPATIENT
Start: 2024-04-05 | End: 2024-04-11 | Stop reason: HOSPADM

## 2024-04-05 RX ORDER — METOCLOPRAMIDE HYDROCHLORIDE 5 MG/ML
10 INJECTION INTRAMUSCULAR; INTRAVENOUS EVERY 6 HOURS SCHEDULED
Status: DISCONTINUED | OUTPATIENT
Start: 2024-04-05 | End: 2024-04-10

## 2024-04-05 RX ORDER — HYDRALAZINE HYDROCHLORIDE 20 MG/ML
10 INJECTION INTRAMUSCULAR; INTRAVENOUS EVERY 6 HOURS PRN
Status: DISCONTINUED | OUTPATIENT
Start: 2024-04-05 | End: 2024-04-07

## 2024-04-05 RX ORDER — DIPHENHYDRAMINE HYDROCHLORIDE 50 MG/ML
25 INJECTION INTRAMUSCULAR; INTRAVENOUS EVERY 6 HOURS PRN
Status: DISCONTINUED | OUTPATIENT
Start: 2024-04-05 | End: 2024-04-11 | Stop reason: HOSPADM

## 2024-04-05 RX ORDER — CHLORHEXIDINE GLUCONATE ORAL RINSE 1.2 MG/ML
15 SOLUTION DENTAL EVERY 12 HOURS SCHEDULED
Status: DISCONTINUED | OUTPATIENT
Start: 2024-04-05 | End: 2024-04-05 | Stop reason: HOSPADM

## 2024-04-05 RX ORDER — CEFAZOLIN SODIUM 1 G/50ML
1000 SOLUTION INTRAVENOUS EVERY 8 HOURS
Qty: 100 ML | Refills: 0 | Status: COMPLETED | OUTPATIENT
Start: 2024-04-05 | End: 2024-04-06

## 2024-04-05 RX ORDER — LIDOCAINE HYDROCHLORIDE AND EPINEPHRINE 15; 5 MG/ML; UG/ML
INJECTION, SOLUTION EPIDURAL
Status: COMPLETED | OUTPATIENT
Start: 2024-04-05 | End: 2024-04-05

## 2024-04-05 RX ORDER — LABETALOL HYDROCHLORIDE 5 MG/ML
10 INJECTION, SOLUTION INTRAVENOUS
Status: DISCONTINUED | OUTPATIENT
Start: 2024-04-05 | End: 2024-04-05 | Stop reason: HOSPADM

## 2024-04-05 RX ORDER — SODIUM CHLORIDE 9 MG/ML
INJECTION, SOLUTION INTRAVENOUS CONTINUOUS PRN
Status: DISCONTINUED | OUTPATIENT
Start: 2024-04-05 | End: 2024-04-05

## 2024-04-05 RX ORDER — FENTANYL CITRATE 50 UG/ML
INJECTION, SOLUTION INTRAMUSCULAR; INTRAVENOUS AS NEEDED
Status: DISCONTINUED | OUTPATIENT
Start: 2024-04-05 | End: 2024-04-05

## 2024-04-05 RX ORDER — ALBUTEROL SULFATE 2.5 MG/3ML
2.5 SOLUTION RESPIRATORY (INHALATION) ONCE AS NEEDED
Status: DISCONTINUED | OUTPATIENT
Start: 2024-04-05 | End: 2024-04-05 | Stop reason: HOSPADM

## 2024-04-05 RX ORDER — METOCLOPRAMIDE HYDROCHLORIDE 5 MG/ML
10 INJECTION INTRAMUSCULAR; INTRAVENOUS ONCE AS NEEDED
Status: DISCONTINUED | OUTPATIENT
Start: 2024-04-05 | End: 2024-04-05 | Stop reason: HOSPADM

## 2024-04-05 RX ORDER — PANTOPRAZOLE SODIUM 40 MG/1
40 TABLET, DELAYED RELEASE ORAL
Status: DISCONTINUED | OUTPATIENT
Start: 2024-04-05 | End: 2024-04-05

## 2024-04-05 RX ORDER — SODIUM CHLORIDE, SODIUM LACTATE, POTASSIUM CHLORIDE, CALCIUM CHLORIDE 600; 310; 30; 20 MG/100ML; MG/100ML; MG/100ML; MG/100ML
125 INJECTION, SOLUTION INTRAVENOUS CONTINUOUS
Status: DISCONTINUED | OUTPATIENT
Start: 2024-04-05 | End: 2024-04-05

## 2024-04-05 RX ORDER — FENTANYL CITRATE/PF 50 MCG/ML
25 SYRINGE (ML) INJECTION
Status: DISCONTINUED | OUTPATIENT
Start: 2024-04-05 | End: 2024-04-05 | Stop reason: HOSPADM

## 2024-04-05 RX ORDER — METOPROLOL TARTRATE 1 MG/ML
5 INJECTION, SOLUTION INTRAVENOUS EVERY 6 HOURS PRN
Status: DISCONTINUED | OUTPATIENT
Start: 2024-04-05 | End: 2024-04-11 | Stop reason: HOSPADM

## 2024-04-05 RX ORDER — HEPARIN SODIUM 5000 [USP'U]/ML
5000 INJECTION, SOLUTION INTRAVENOUS; SUBCUTANEOUS EVERY 8 HOURS SCHEDULED
Status: DISCONTINUED | OUTPATIENT
Start: 2024-04-05 | End: 2024-04-11 | Stop reason: HOSPADM

## 2024-04-05 RX ORDER — SODIUM CHLORIDE 9 MG/ML
125 INJECTION, SOLUTION INTRAVENOUS CONTINUOUS
Status: DISCONTINUED | OUTPATIENT
Start: 2024-04-05 | End: 2024-04-05

## 2024-04-05 RX ORDER — PROMETHAZINE HYDROCHLORIDE 25 MG/ML
12.5 INJECTION, SOLUTION INTRAMUSCULAR; INTRAVENOUS ONCE AS NEEDED
Status: DISCONTINUED | OUTPATIENT
Start: 2024-04-05 | End: 2024-04-05 | Stop reason: HOSPADM

## 2024-04-05 RX ORDER — METOPROLOL TARTRATE 1 MG/ML
2.5 INJECTION, SOLUTION INTRAVENOUS EVERY 6 HOURS
Status: DISCONTINUED | OUTPATIENT
Start: 2024-04-05 | End: 2024-04-07

## 2024-04-05 RX ORDER — HYDROMORPHONE HYDROCHLORIDE 2 MG/ML
INJECTION, SOLUTION INTRAMUSCULAR; INTRAVENOUS; SUBCUTANEOUS AS NEEDED
Status: DISCONTINUED | OUTPATIENT
Start: 2024-04-05 | End: 2024-04-05

## 2024-04-05 RX ORDER — MAGNESIUM SULFATE HEPTAHYDRATE 40 MG/ML
2 INJECTION, SOLUTION INTRAVENOUS ONCE
Status: COMPLETED | OUTPATIENT
Start: 2024-04-05 | End: 2024-04-06

## 2024-04-05 RX ORDER — PROTAMINE SULFATE 10 MG/ML
INJECTION, SOLUTION INTRAVENOUS AS NEEDED
Status: DISCONTINUED | OUTPATIENT
Start: 2024-04-05 | End: 2024-04-05

## 2024-04-05 RX ORDER — OXYMETAZOLINE HYDROCHLORIDE 0.05 G/100ML
SPRAY NASAL AS NEEDED
Status: DISCONTINUED | OUTPATIENT
Start: 2024-04-05 | End: 2024-04-05

## 2024-04-05 RX ORDER — HYDROMORPHONE HCL/PF 1 MG/ML
0.5 SYRINGE (ML) INJECTION
Status: DISCONTINUED | OUTPATIENT
Start: 2024-04-05 | End: 2024-04-05 | Stop reason: HOSPADM

## 2024-04-05 RX ORDER — HEPARIN SODIUM 1000 [USP'U]/ML
INJECTION, SOLUTION INTRAVENOUS; SUBCUTANEOUS AS NEEDED
Status: DISCONTINUED | OUTPATIENT
Start: 2024-04-05 | End: 2024-04-05

## 2024-04-05 RX ORDER — GLYCOPYRROLATE 0.2 MG/ML
INJECTION INTRAMUSCULAR; INTRAVENOUS AS NEEDED
Status: DISCONTINUED | OUTPATIENT
Start: 2024-04-05 | End: 2024-04-05

## 2024-04-05 RX ORDER — ROPIVACAINE HYDROCHLORIDE 2 MG/ML
INJECTION, SOLUTION EPIDURAL; INFILTRATION; PERINEURAL CONTINUOUS PRN
Status: DISCONTINUED | OUTPATIENT
Start: 2024-04-05 | End: 2024-04-05

## 2024-04-05 RX ORDER — CEFAZOLIN SODIUM 2 G/50ML
2000 SOLUTION INTRAVENOUS ONCE
Status: COMPLETED | OUTPATIENT
Start: 2024-04-05 | End: 2024-04-05

## 2024-04-05 RX ORDER — PANTOPRAZOLE SODIUM 40 MG/10ML
40 INJECTION, POWDER, LYOPHILIZED, FOR SOLUTION INTRAVENOUS
Status: CANCELLED | OUTPATIENT
Start: 2024-04-05

## 2024-04-05 RX ORDER — ROCURONIUM BROMIDE 10 MG/ML
INJECTION, SOLUTION INTRAVENOUS AS NEEDED
Status: DISCONTINUED | OUTPATIENT
Start: 2024-04-05 | End: 2024-04-05

## 2024-04-05 RX ORDER — ONDANSETRON 2 MG/ML
4 INJECTION INTRAMUSCULAR; INTRAVENOUS ONCE AS NEEDED
Status: DISCONTINUED | OUTPATIENT
Start: 2024-04-05 | End: 2024-04-05 | Stop reason: HOSPADM

## 2024-04-05 RX ORDER — MANNITOL 250 MG/ML
INJECTION, SOLUTION INTRAVENOUS AS NEEDED
Status: DISCONTINUED | OUTPATIENT
Start: 2024-04-05 | End: 2024-04-05

## 2024-04-05 RX ORDER — DEXAMETHASONE SODIUM PHOSPHATE 10 MG/ML
INJECTION, SOLUTION INTRAMUSCULAR; INTRAVENOUS AS NEEDED
Status: DISCONTINUED | OUTPATIENT
Start: 2024-04-05 | End: 2024-04-05

## 2024-04-05 RX ORDER — METOPROLOL TARTRATE 1 MG/ML
5 INJECTION, SOLUTION INTRAVENOUS EVERY 6 HOURS
Status: DISCONTINUED | OUTPATIENT
Start: 2024-04-05 | End: 2024-04-05

## 2024-04-05 RX ORDER — ALBUMIN, HUMAN INJ 5% 5 %
SOLUTION INTRAVENOUS CONTINUOUS PRN
Status: DISCONTINUED | OUTPATIENT
Start: 2024-04-05 | End: 2024-04-05

## 2024-04-05 RX ORDER — LIDOCAINE HYDROCHLORIDE 10 MG/ML
INJECTION, SOLUTION EPIDURAL; INFILTRATION; INTRACAUDAL; PERINEURAL AS NEEDED
Status: DISCONTINUED | OUTPATIENT
Start: 2024-04-05 | End: 2024-04-05

## 2024-04-05 RX ORDER — HYDROMORPHONE HCL IN WATER/PF 6 MG/30 ML
0.2 PATIENT CONTROLLED ANALGESIA SYRINGE INTRAVENOUS
Status: DISCONTINUED | OUTPATIENT
Start: 2024-04-05 | End: 2024-04-05 | Stop reason: HOSPADM

## 2024-04-05 RX ORDER — CHLORHEXIDINE GLUCONATE ORAL RINSE 1.2 MG/ML
15 SOLUTION DENTAL ONCE
Status: COMPLETED | OUTPATIENT
Start: 2024-04-05 | End: 2024-04-05

## 2024-04-05 RX ORDER — ONDANSETRON 2 MG/ML
INJECTION INTRAMUSCULAR; INTRAVENOUS AS NEEDED
Status: DISCONTINUED | OUTPATIENT
Start: 2024-04-05 | End: 2024-04-05

## 2024-04-05 RX ORDER — LIDOCAINE HYDROCHLORIDE 10 MG/ML
INJECTION, SOLUTION EPIDURAL; INFILTRATION; INTRACAUDAL; PERINEURAL
Status: DISPENSED
Start: 2024-04-05 | End: 2024-04-05

## 2024-04-05 RX ORDER — PANTOPRAZOLE SODIUM 40 MG/10ML
40 INJECTION, POWDER, LYOPHILIZED, FOR SOLUTION INTRAVENOUS DAILY
Status: DISCONTINUED | OUTPATIENT
Start: 2024-04-05 | End: 2024-04-08

## 2024-04-05 RX ORDER — HYDROMORPHONE HCL/PF 1 MG/ML
0.5 SYRINGE (ML) INJECTION
Status: DISCONTINUED | OUTPATIENT
Start: 2024-04-05 | End: 2024-04-06

## 2024-04-05 RX ORDER — KETAMINE HCL IN NACL, ISO-OSM 100MG/10ML
SYRINGE (ML) INJECTION AS NEEDED
Status: DISCONTINUED | OUTPATIENT
Start: 2024-04-05 | End: 2024-04-05

## 2024-04-05 RX ORDER — LABETALOL HYDROCHLORIDE 5 MG/ML
10 INJECTION, SOLUTION INTRAVENOUS EVERY 6 HOURS PRN
Status: DISCONTINUED | OUTPATIENT
Start: 2024-04-05 | End: 2024-04-07

## 2024-04-05 RX ADMIN — HEPARIN SODIUM 3000 UNITS: 1000 INJECTION INTRAVENOUS; SUBCUTANEOUS at 12:37

## 2024-04-05 RX ADMIN — CALCIUM CHLORIDE 0.5 G: 100 INJECTION INTRAVENOUS; INTRAVENTRICULAR at 13:10

## 2024-04-05 RX ADMIN — HYDRALAZINE HYDROCHLORIDE 10 MG: 20 INJECTION INTRAMUSCULAR; INTRAVENOUS at 21:12

## 2024-04-05 RX ADMIN — HEPARIN SODIUM 3000 UNITS: 1000 INJECTION INTRAVENOUS; SUBCUTANEOUS at 11:33

## 2024-04-05 RX ADMIN — HEPARIN SODIUM 5000 UNITS: 5000 INJECTION INTRAVENOUS; SUBCUTANEOUS at 17:47

## 2024-04-05 RX ADMIN — ALBUMIN (HUMAN): 12.5 INJECTION, SOLUTION INTRAVENOUS at 11:15

## 2024-04-05 RX ADMIN — FENTANYL CITRATE 50 MCG: 50 INJECTION INTRAMUSCULAR; INTRAVENOUS at 09:30

## 2024-04-05 RX ADMIN — ALBUMIN (HUMAN): 12.5 INJECTION, SOLUTION INTRAVENOUS at 08:40

## 2024-04-05 RX ADMIN — ROCURONIUM BROMIDE 30 MG: 10 INJECTION, SOLUTION INTRAVENOUS at 10:13

## 2024-04-05 RX ADMIN — ROCURONIUM BROMIDE 20 MG: 10 INJECTION, SOLUTION INTRAVENOUS at 14:13

## 2024-04-05 RX ADMIN — SODIUM CHLORIDE, SODIUM LACTATE, POTASSIUM CHLORIDE, AND CALCIUM CHLORIDE: .6; .31; .03; .02 INJECTION, SOLUTION INTRAVENOUS at 08:10

## 2024-04-05 RX ADMIN — CHLORHEXIDINE GLUCONATE 15 ML: 1.2 SOLUTION ORAL at 05:58

## 2024-04-05 RX ADMIN — ALBUMIN (HUMAN): 12.5 INJECTION, SOLUTION INTRAVENOUS at 11:21

## 2024-04-05 RX ADMIN — CALCIUM CHLORIDE 1 G: 100 INJECTION INTRAVENOUS; INTRAVENTRICULAR at 11:32

## 2024-04-05 RX ADMIN — FENTANYL CITRATE: 0.05 INJECTION, SOLUTION INTRAMUSCULAR; INTRAVENOUS at 15:24

## 2024-04-05 RX ADMIN — METOROPROLOL TARTRATE 2.5 MG: 5 INJECTION, SOLUTION INTRAVENOUS at 21:13

## 2024-04-05 RX ADMIN — MIDAZOLAM 2 MG: 1 INJECTION INTRAMUSCULAR; INTRAVENOUS at 08:00

## 2024-04-05 RX ADMIN — FENTANYL CITRATE 50 MCG: 50 INJECTION INTRAMUSCULAR; INTRAVENOUS at 08:00

## 2024-04-05 RX ADMIN — METOCLOPRAMIDE HYDROCHLORIDE 10 MG: 5 INJECTION INTRAMUSCULAR; INTRAVENOUS at 23:56

## 2024-04-05 RX ADMIN — CALCIUM CHLORIDE 0.5 G: 100 INJECTION INTRAVENOUS; INTRAVENTRICULAR at 12:09

## 2024-04-05 RX ADMIN — PROTAMINE SULFATE 60 MG: 10 INJECTION, SOLUTION INTRAVENOUS at 13:19

## 2024-04-05 RX ADMIN — Medication 50 MG: at 08:12

## 2024-04-05 RX ADMIN — LIDOCAINE HYDROCHLORIDE 100 MG: 10 INJECTION, SOLUTION EPIDURAL; INFILTRATION; INTRACAUDAL; PERINEURAL at 08:12

## 2024-04-05 RX ADMIN — MANNITOL 12.5 G: 12.5 INJECTION, SOLUTION INTRAVENOUS at 09:37

## 2024-04-05 RX ADMIN — ROCURONIUM BROMIDE 50 MG: 10 INJECTION, SOLUTION INTRAVENOUS at 10:55

## 2024-04-05 RX ADMIN — CEFAZOLIN SODIUM 1000 MG: 1 SOLUTION INTRAVENOUS at 22:10

## 2024-04-05 RX ADMIN — HEPARIN SODIUM 3000 UNITS: 1000 INJECTION INTRAVENOUS; SUBCUTANEOUS at 10:47

## 2024-04-05 RX ADMIN — Medication 1 EACH: at 10:15

## 2024-04-05 RX ADMIN — MAGNESIUM SULFATE HEPTAHYDRATE 2 G: 40 INJECTION, SOLUTION INTRAVENOUS at 19:18

## 2024-04-05 RX ADMIN — SODIUM BICARBONATE 100 MEQ: 84 INJECTION, SOLUTION INTRAVENOUS at 11:48

## 2024-04-05 RX ADMIN — GLYCOPYRROLATE 0.1 MG: 0.2 INJECTION, SOLUTION INTRAMUSCULAR; INTRAVENOUS at 08:40

## 2024-04-05 RX ADMIN — PANTOPRAZOLE SODIUM 40 MG: 40 INJECTION, POWDER, FOR SOLUTION INTRAVENOUS at 21:53

## 2024-04-05 RX ADMIN — SODIUM CHLORIDE: 0.9 INJECTION, SOLUTION INTRAVENOUS at 08:16

## 2024-04-05 RX ADMIN — SODIUM CHLORIDE, SODIUM LACTATE, POTASSIUM CHLORIDE, AND CALCIUM CHLORIDE 1000 ML: .6; .31; .03; .02 INJECTION, SOLUTION INTRAVENOUS at 18:05

## 2024-04-05 RX ADMIN — ROCURONIUM BROMIDE 50 MG: 10 INJECTION, SOLUTION INTRAVENOUS at 12:38

## 2024-04-05 RX ADMIN — HYDROMORPHONE HYDROCHLORIDE 1 MG: 2 INJECTION, SOLUTION INTRAMUSCULAR; INTRAVENOUS; SUBCUTANEOUS at 08:40

## 2024-04-05 RX ADMIN — ACETAMINOPHEN 1000 MG: 10 INJECTION INTRAVENOUS at 17:40

## 2024-04-05 RX ADMIN — FENTANYL CITRATE 50 MCG: 50 INJECTION INTRAMUSCULAR; INTRAVENOUS at 14:49

## 2024-04-05 RX ADMIN — FENTANYL CITRATE 50 MCG: 50 INJECTION INTRAMUSCULAR; INTRAVENOUS at 08:12

## 2024-04-05 RX ADMIN — HEPARIN SODIUM 8000 UNITS: 1000 INJECTION INTRAVENOUS; SUBCUTANEOUS at 10:37

## 2024-04-05 RX ADMIN — LIDOCAINE HYDROCHLORIDE AND EPINEPHRINE 5 ML: 15; 5 INJECTION, SOLUTION EPIDURAL at 08:01

## 2024-04-05 RX ADMIN — ROCURONIUM BROMIDE 20 MG: 10 INJECTION, SOLUTION INTRAVENOUS at 09:23

## 2024-04-05 RX ADMIN — SODIUM CHLORIDE 8 MCG: 900 INJECTION INTRAVENOUS at 09:06

## 2024-04-05 RX ADMIN — MANNITOL 12.5 G: 12.5 INJECTION, SOLUTION INTRAVENOUS at 10:40

## 2024-04-05 RX ADMIN — PHENYLEPHRINE HYDROCHLORIDE 40 MCG/MIN: 10 INJECTION INTRAVENOUS at 08:18

## 2024-04-05 RX ADMIN — FAMOTIDINE 20 MG: 10 INJECTION INTRAVENOUS at 17:52

## 2024-04-05 RX ADMIN — ACETAMINOPHEN 1000 MG: 10 INJECTION INTRAVENOUS at 23:38

## 2024-04-05 RX ADMIN — METOCLOPRAMIDE HYDROCHLORIDE 10 MG: 5 INJECTION INTRAMUSCULAR; INTRAVENOUS at 17:47

## 2024-04-05 RX ADMIN — OXYMETAZOLINE HYDROCHLORIDE 2 SPRAY: 0.05 SPRAY NASAL at 08:50

## 2024-04-05 RX ADMIN — DEXAMETHASONE SODIUM PHOSPHATE 10 MG: 10 INJECTION, SOLUTION INTRAMUSCULAR; INTRAVENOUS at 08:40

## 2024-04-05 RX ADMIN — LIDOCAINE 5% 2 PATCH: 700 PATCH TOPICAL at 17:47

## 2024-04-05 RX ADMIN — HYDROMORPHONE HYDROCHLORIDE 0.5 MG: 2 INJECTION, SOLUTION INTRAMUSCULAR; INTRAVENOUS; SUBCUTANEOUS at 14:40

## 2024-04-05 RX ADMIN — ROPIVACAINE HYDROCHLORIDE 3 ML/HR: 2 INJECTION, SOLUTION EPIDURAL; INFILTRATION at 08:40

## 2024-04-05 RX ADMIN — LABETALOL HYDROCHLORIDE 10 MG: 5 INJECTION, SOLUTION INTRAVENOUS at 19:26

## 2024-04-05 RX ADMIN — SODIUM CHLORIDE 8 MCG: 900 INJECTION INTRAVENOUS at 09:30

## 2024-04-05 RX ADMIN — FENTANYL CITRATE 25 MCG: 50 INJECTION INTRAMUSCULAR; INTRAVENOUS at 15:19

## 2024-04-05 RX ADMIN — HYDROMORPHONE HYDROCHLORIDE 0.5 MG: 1 INJECTION, SOLUTION INTRAMUSCULAR; INTRAVENOUS; SUBCUTANEOUS at 19:18

## 2024-04-05 RX ADMIN — ALBUMIN (HUMAN): 12.5 INJECTION, SOLUTION INTRAVENOUS at 11:09

## 2024-04-05 RX ADMIN — SODIUM CHLORIDE, SODIUM LACTATE, POTASSIUM CHLORIDE, AND CALCIUM CHLORIDE: .6; .31; .03; .02 INJECTION, SOLUTION INTRAVENOUS at 11:33

## 2024-04-05 RX ADMIN — CEFAZOLIN SODIUM 2000 MG: 2 SOLUTION INTRAVENOUS at 08:35

## 2024-04-05 RX ADMIN — FENTANYL CITRATE 50 MCG: 50 INJECTION INTRAMUSCULAR; INTRAVENOUS at 08:55

## 2024-04-05 RX ADMIN — CEFAZOLIN SODIUM 2000 MG: 2 SOLUTION INTRAVENOUS at 12:21

## 2024-04-05 RX ADMIN — ROCURONIUM BROMIDE 50 MG: 10 INJECTION, SOLUTION INTRAVENOUS at 08:12

## 2024-04-05 RX ADMIN — FENTANYL CITRATE 50 MCG: 50 INJECTION INTRAMUSCULAR; INTRAVENOUS at 14:40

## 2024-04-05 RX ADMIN — METOROPROLOL TARTRATE 2.5 MG: 5 INJECTION, SOLUTION INTRAVENOUS at 17:47

## 2024-04-05 RX ADMIN — ONDANSETRON 4 MG: 2 INJECTION INTRAMUSCULAR; INTRAVENOUS at 08:40

## 2024-04-05 RX ADMIN — PROPOFOL 100 MG: 10 INJECTION, EMULSION INTRAVENOUS at 08:12

## 2024-04-05 RX ADMIN — HEPARIN SODIUM 5000 UNITS: 5000 INJECTION INTRAVENOUS; SUBCUTANEOUS at 21:12
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
## 2024-04-05 NOTE — DISCHARGE INSTR - AVS FIRST PAGE
DISCHARGE INSTRUCTIONS  OPEN AORTIC ANEURYSM REPAIR    ACTIVITY:   Limit your physical activity to slow walking for the first week and then increase your activity as tolerated.  If you become short of breath or tired, stop and rest.  You may require help with walking or feel more secure with something to lean on.  Walking up steps and normal activities may be resumed as you feel ready.  Most people tire easily for the first few weeks following aneurysm surgery.  This improves as conditioning returns.  Avoid strenuous activity, such as vigorous exercise.  Avoid heavy lifting (do not lift more than 15 pounds) for the first six weeks after surgery.  You should not drive a car for at least one week following discharge from the hospital and you are off all narcotic pain medication.  You may ride in a car.    DIET:  Resume your normal diet.  Good nutrition is important for healing of your incision.  Your appetite may be poor for several weeks following abdominal surgery and you may experience some weight loss.  A laxative or fleets enema may help regulate bowel movements in the postoperative period.  If you are discharged on narcotics for pain control, continue taking your stool softeners until you are having regular bowel movements.    INCISION:  You should shower daily.  Wash incision daily with soap and water, but do not rub or scrub the incision; rinse thoroughly and pat dry.  You may have stitches or staples to close your incision and it is okay for these to get wet.  Do not bathe in a tub or swim for the first 4 week following surgery or if you have any open wounds.  It is normal to have swelling or discoloration around the incision.   If increasing redness or pain develops, call our office immediately.  Numbness in the region of the incision may occur following the surgery.  This normally improves over six to twelve months.    If any of your incisions are open and require dressing changes, you will be given  instructions for your daily incision care. If you are not able to change the dressings, a visiting nurse will be arranged.    DO NOT put any powders, creams, ointments, or lotions on your incision.    FOLLOW UP STUDIES:  Doppler ultrasound studies are very important for long-term management.  Your surgeon will arrange this at your first postoperative visit.     FOLLOW UP APPOINTMENTS:  Making and keeping follow up appointments and ultrasound tests are important to your recovery.  If you have difficulty making it to or keeping your follow up appointments, call the office.    If you have increased pain, fever >101.5, increased drainage, redness or a bad smell at your surgery site, new coldness/numbness of your arm or leg, please call us immediately and GO directly to the ER.    Tj wright/ Dr. Calhoun Doctor: 4/15/2024 at 2:30pm, [facility]    PLEASE CALL THE OFFICE IF YOU HAVE ANY QUESTIONS  [phone]  [phone] [address], [address], [place] [address]  [address] [facility], [address], [place] [address]  [address] [place] [address]  [address] [facility], [address], [place] [address]  [address] [facility], [place] [address]  [address] [facility], [address], [address], [place] [address]  [address] [facility], [address], [place] [address]  [address] [facility], 1st Floor, [address], [place] [address]  [address], [place] [address]  [address] [address]

## 2024-04-05 NOTE — ANESTHESIA PROCEDURE NOTES
Epidural Block    Patient location during procedure: holding area  Start time: 4/5/2024 8:00 AM  Reason for block: procedure for pain  Staffing  Performed by: Elsa Winston MD  Authorized by: Elsa Winston MD    Preanesthetic Checklist  Completed: patient identified, IV checked, site marked, risks and benefits discussed, surgical consent, monitors and equipment checked, pre-op evaluation and timeout performed  Epidural  Patient position: sitting  Prep: ChloraPrep  Sedation Level: light sedation  Patient monitoring: frequent blood pressure checks, continuous pulse oximetry and heart rate  Approach: midline  Location: thoracic, T10-11  Injection technique: LIVAN saline  Needle  Needle type: Tuohy   Needle gauge: 17 G  Needle insertion depth: 7 cm  Catheter type: multi-orifice  Catheter size: 19 G  Catheter at skin depth: 13 cm  Catheter securement method: stabilization device and clear occlusive dressing  Test dose: negativelidocaine-epinephrine (XYLOCAINE-MPF/EPINEPHRINE) 1.5 %-1:200,000 injection 3 mL - Epidural   5 mL - 4/5/2024 8:01:00 AM  Assessment  Sensory level: T10  Number of attempts: 3 or morenegative aspiration for CSF, negative aspiration for heme and no paresthesia on injection  patient tolerated the procedure well with no immediate complications  Additional Notes  Unable to advance catheter at T7-T8 and T8-T9 levels. Easy placement at T10

## 2024-04-05 NOTE — ANESTHESIA PROCEDURE NOTES
"Introducer/[place]    Performed by: Elsa Winston MD  Authorized by: Elsa Winston MD    Date/Time: 4/5/2024 8:25 AM  Consent: Verbal consent obtained. Written consent obtained.  Risks and benefits: risks, benefits and alternatives were discussed  Consent given by: patient  Patient understanding: patient states understanding of the procedure being performed  Patient consent: the patient's understanding of the procedure matches consent given  Required items: required blood products, implants, devices, and special equipment available  Patient identity confirmed: provided demographic data, arm band and hospital-assigned identification number  Time out: Immediately prior to procedure a \"time out\" was called to verify the correct patient, procedure, equipment, support staff and site/side marked as required.  Indications: vascular access and central pressure monitoring  Location details: right internal jugular  Catheter size: 9 Fr  Patient position: Trendelenburg  Assessment: blood return through all ports and free fluid flow  Preparation: skin prepped with 2% chlorhexidine  Skin prep agent dried: skin prep agent completely dried prior to procedure  Sterile barriers: all five maximum sterile barriers used - cap, mask, sterile gown, sterile gloves, and large sterile sheet  Hand hygiene: hand hygiene performed prior to central venous catheter insertion  Ultrasound guidance: yes  ultrasound permanent image saved  Number of attempts: 1  Successful placement: yes  Post-procedure: line sutured and dressing applied  Patient tolerance: Patient tolerated the procedure well with no immediate complications and patient tolerated the procedure well with no immediate complications        "
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
## 2024-04-05 NOTE — ANESTHESIA POSTPROCEDURE EVALUATION
Post-Op Assessment Note    CV Status:  Stable  Pain Score: 0    Pain management: adequate      Post-op block assessment: secured with tape   Mental Status:  Alert and awake   Hydration Status:  Euvolemic   PONV Controlled:  Controlled   Airway Patency:  Patent  Two or more mitigation strategies used for obstructive sleep apnea   Post Op Vitals Reviewed: Yes    Anethesia notable event occurred.    Staff: Anesthesiologist     Reason for prolonged intubation > 24 hours:  N/aReason for prolonged intubation > 48 hours:  N/a    Nursing called for patient having new right sided sensory changes. I went to bedside and evaluated this patient for possible epidural involvement. Epidural was assessed and showed no tenderness to palpation with clean and dry dressing in place. No bleeding noted on exam. Patient was noted to have 5/5 strength in all extremities and had some minor sensory changes that he related over part of the L5/S1 distribution that changes with multiple exams. The epidural had been stopped prior to my evaluation and he was feeling very comfortable. Discussion with patient an family, they felt strongly that getting neurology involved with a stat consult would make them feel better. Neurology was stat consulted and oncall resident messaged/called through [employer]. Neurology by beside. Answered all questions.       BP   169/93   Temp      Pulse 82   Resp 18   SpO2 97%

## 2024-04-05 NOTE — OP NOTE
OPERATIVE REPORT  PATIENT NAME: Sourav Saul    :  1965  MRN: [id number]  Pt Location: [facility] OR ROOM 08    SURGERY DATE: 2024    Surgeons and Role:     * Unique Wilson MD - Primary     * Checo Yu MD - Assisting R1     * Sunny Ferraro, DO - Fellow 2     * Patrick Lewis, DO - Fellow 1    Preop Diagnosis:  Abdominal aortic aneurysm (AAA), unspecified part, unspecified whether ruptured (HCC) [I71.40]  Iliac artery aneurysm, bilateral (HCC) [I72.3]    Post-Op Diagnosis Codes:     * Abdominal aortic aneurysm (AAA), unspecified part, unspecified whether ruptured (HCC) [I71.40]     * Iliac artery aneurysm, bilateral (HCC) [I72.3]    Procedure(s):  Open aortoiliac aneurysm repair with 18x9mm dacron bifurcated graft  Aorta-R EIA-L LINDA    Specimen(s):  none    Estimated Blood Loss:  2700 mL  UOP: 300cc    IVF: 8L  1L albumin  2u pRBCs  2u FFP  Cell saver: 1L      Drains:  NG/OG/Enteral Tube Nasogastric 18 Fr Right nare (Active)   Number of days: 0       Urethral Catheter Latex 16 Fr. (Active)   Number of days: 0       Anesthesia Type:   General w/ Epidural    Operative Indications:  Patient is a 59 yo M w/ 7.5cm AAA and B LINDA aneurysms, R>L, with multiple renal arteries and short aortic neck, presents for operative management.    Operative Findings:  Multiple renal arteries:  R main at the aortic neck and R accessory at the level of the KEELEY  Two renals on the L at the level of the aortic neck, the proximal is the main, the distal appears to be occluded with no backbleeding or signal and could not be well visualized on preop CT scan  Large abdominal aortic aneurysm  On the right side, the iliac bifurcation was poor tissue quality and very deep in the pelvis; this was ultimately ligated and the anastomosis was to the external iliac artery  On the left, the anastomosis was to the common iliac artery with residual 2.3cm L LINDA aneurysm distally; however the bifurcation was very difficult to reach and we were  able to preserve the internal iliac artery  A circumaortic left renal vein was noted on preoperative CT scan    Complications:   None    Procedure and Technique:  Prior to procedure, an epidural catheter was placed for pain control.  After informed consent was obtained, the patient was brought to the operating room and placed in the supine position with the arms outstretched.  He was given ancef for perioperative antibiotics.  He was given anesthesia and endotracheally intubated.  An arterial line and central line were placed.  He was prepped and draped in the usual sterile fashion.  A timeout was performed.  A midline incision was made from the pubis to the xyphoid process.  Cautery was used to dissect through the soft tissue.  The fascia was incised and the abdomen was entered.  The fascia was divided along the length of the incision.  The abdomen was examined.  The liver, small bowel, and colon all appeared viable.  The transverse colon was wrapped with a moist towel and pulled superiorly.  The small bowel was moved to the patients right side and wrapped in a moist towel.  The soft tissue anterior to the aneurysm was incised between the inferior mesenteric vein and the duodenum to reflext the duodenum to the patients right side.  A bookwalter retractor was used to retract the bowel.  The dissection was continued superiorly.  The anterior renal vein was identified, freed, double tied and ligated.      The bilateral renal arteries were then freed circumferentially and vessel loops were placed.  There were noted to be two left renal arteries.  The neck was freed superiorly to the renal arteries and the aorta appeared normal in caliber at this point.  The dissection was then continued inferiorly along the anterior portion of the aneurysm sac. The inferior mesenteric artery was identified and suture ligated and divided in order to obtain access to the accessory renal artery on the left.  The bilateral accessory renal  "arteries were identified and noted to be small, about 2mm in diameter.  These were both encircled with vessel loops    The bilateral common iliac arteries were noted to be aneurysmal, much larger on the right.  On the right side, we continue the dissection along the common iliac artery to the bifurcation.  This was noted to be very deep in the pelvis and the vessels ran quite posterior.  The external iliac artery was freed and encircled with a vessel loop.  The internal iliac artery was freed as much as possible and a large iliac vein was noted posterior to this.  On the left side, we continued our dissection around the iliac bifurcation, however this was noted to be very deep and posterior and we did not think we could obtain adequate control of the internal iliac artery.  The common iliac artery measured 2.3cm on CT scan and the decision was made to perform the anastomosis to the proximal common iliac artery to maintain the internal iliac flow and if the aneruysm increased to critical size, this could be repaired in the future.    12.5mg of mannitol were given during dissection and an addition 12.5mg was given upon clamping. 8000units of IV heparin were given.  ACT was subtherapeutic and an additional 3000 units was given.  The renal artery loops were pulled taught, followed by the iliac artery vessel loops, the R internal iliac artery was clamped.  The sigmoidal aortic clamp was used for suprarenal aortic control.  The sac was opened and the arteriotomy was extended proximally and distally with scissors.  The sac contents were evacuated and the sac was checked for bleeding.  No backbleeding lumbar arteries were noted.  The proximal aspect of the aortic neck was \"T'ed off\".  A 18x9mm Dacron graft was selected.  3-0 prolene was used for the proximal anastomosis which was started posteriorly and run circumferentially.  The graft limbs were cross-clamped distally and the aortic clamp was reopened.  The renal arteries " "were again perfused.  Surgicel was used for hemostasis around the proximal anastomosis.       We then turned our attention to the distal anastomoses, starting with the left side.  The distal aortic aneurysm was opened and the distal aspect was \"T'ed off\" at the proximal common iliac artery.  The graft was slightly beveled and cut to length.  The distal anastamosis was then created in an end to end fashion with 4-0 prolene suture starting posteriorly and run circumferentially.  Prior to completion, the iliac artery was back bled, the limb was forward bled and the graft was flushed.  The anastomosis was completed and flow was restored.  Surgicel was used for hemostasis.     We then turned our attention to the right distal anastomosis.  Clamps were placed on the internal iliac artery and the loop was tightened on the external iliac artery.  The arteriotomy was continued down the common iliac sac and was T'ed off at the iliac bifurcation.  There continued to be significant backbleeding from the bifurcation and the tissue quality was poor.  After multiple attempts to better control the arteries distally, the decision was made to ligate the iliac bifurcation by doubly oversewing it with 4-0 prolene suture and the anastomosis moved distally onto the external iliac artery.  We made a separate incision in the retroperitoneum, making sure to keep the ureter protected and dissected free the external iliac artery over several cms.  The artery was ligated with 0-0 silk suture proximally, clamped distally with a valenzuela and transected near the ligation point.  The graft was placed within the common iliac artery aneurysm sac and then end to end to the external iliac artery.  The graft was beveled and cut to length.  The distal anastamosis was then created in an end to end fashion with 4-0 prolene suture in a parachute fashion.  Prior to completion, the iliac artery was back bled, the limb was forward bled and the anastomosis was " flushed.  The anastomosis was completed and flow was restored distally.  Surgicel was used for hemostasis.      Silk sutures were used to ligate the two accessory renal arteries.  60mg of protamine was given.  Floseal and surgicel were used to help with hemostasis.  The aneurysm bed was checked for hemostasis. Chromic suture was used to close the aneurysm sac over the graft over the main body and also around the right limb.  The bookwalter was released and the bowel was again examined and noted to be pink and viable.  The retroperitoneum was then closed with chromic suture.  The fascia was then closed with looped 0-chromic, run from either direction.  The skin was closed with staples and the incision was dressed with silver mepilex.  The patient was allowed to awaken and was extubated.  He was transferred to the PACU for postoperative care.     Biphasic R DP/PT signals and 2+ L DP pulse at completion     I was present for the entire procedure.    Patient Disposition:  PACU         SIGNATURE: Unique Wilson MD  DATE: April 5, 2024  TIME: 2:46 PM    Vascular Quality Initiative -Open AAA Repair    Anesthesia: General and epidural    Conversion From Endo AAA: No          Renal/Visceral Ischemic Time: 23 minutes    Exposure: Anterior    Distal Anastomosis: R EIA and L LINDA Graft Body Diameter 18x9 mm    Graft Type: Dacron, knitted    Hypogastric ligated/occluded: Single    Proximal Clamp Position: Above both renals    KEELEY at Completion: Ligated    Heparin: yes Cold Renal Perfusion:  no    Mannitol:  yes              Crystalloid: 8000ml    Autotransfusion: 1000ml  PRBC (in OR): 2 units    Total Procedure Time:   Event Time In   Procedure Start 0850   Procedure Closing 1411   Procedure Finish        EBL: 2700 mL    Skin Prep:Chlor + Alcohol     Concomitant Procedure   Thrombo-embolectomy: no          Renal Bypass: no     Infra-Inguinal Bypass: no   Other Abdominal: no

## 2024-04-06 PROBLEM — R20.8 DECREASED SENSATION: Status: ACTIVE | Noted: 2024-04-06

## 2024-04-06 LAB
ABO GROUP BLD BPU: NORMAL
ANION GAP SERPL CALCULATED.3IONS-SCNC: 11 MMOL/L (ref 4–13)
BACTERIA UR QL AUTO: ABNORMAL /HPF
BILIRUB UR QL STRIP: NEGATIVE
BPU ID: NORMAL
BUN SERPL-MCNC: 34 MG/DL (ref 5–25)
CALCIUM SERPL-MCNC: 7.6 MG/DL (ref 8.4–10.2)
CHLORIDE SERPL-SCNC: 110 MMOL/L (ref 96–108)
CHLORIDE UR-SCNC: 100 MMOL/L
CLARITY UR: CLEAR
CO2 SERPL-SCNC: 22 MMOL/L (ref 21–32)
COLOR UR: ABNORMAL
CREAT SERPL-MCNC: 2.62 MG/DL (ref 0.6–1.3)
CREAT UR-MCNC: 107.4 MG/DL
CROSSMATCH: NORMAL
ERYTHROCYTE [DISTWIDTH] IN BLOOD BY AUTOMATED COUNT: 13.7 % (ref 11.6–15.1)
GFR SERPL CREATININE-BSD FRML MDRD: 25 ML/MIN/1.73SQ M
GLUCOSE SERPL-MCNC: 105 MG/DL (ref 65–140)
GLUCOSE SERPL-MCNC: 124 MG/DL (ref 65–140)
GLUCOSE SERPL-MCNC: 134 MG/DL (ref 65–140)
GLUCOSE SERPL-MCNC: 138 MG/DL (ref 65–140)
GLUCOSE SERPL-MCNC: 170 MG/DL (ref 65–140)
GLUCOSE UR STRIP-MCNC: NEGATIVE MG/DL
HCT VFR BLD AUTO: 33.1 % (ref 36.5–49.3)
HGB BLD-MCNC: 11.1 G/DL (ref 12–17)
HGB UR QL STRIP.AUTO: ABNORMAL
KETONES UR STRIP-MCNC: NEGATIVE MG/DL
LACTATE SERPL-SCNC: 1.7 MMOL/L (ref 0.5–2)
LACTATE SERPL-SCNC: 2.1 MMOL/L (ref 0.5–2)
LACTATE SERPL-SCNC: 2.6 MMOL/L (ref 0.5–2)
LACTATE SERPL-SCNC: 3.1 MMOL/L (ref 0.5–2)
LEUKOCYTE ESTERASE UR QL STRIP: NEGATIVE
MAGNESIUM SERPL-MCNC: 1.8 MG/DL (ref 1.9–2.7)
MCH RBC QN AUTO: 29.1 PG (ref 26.8–34.3)
MCHC RBC AUTO-ENTMCNC: 33.5 G/DL (ref 31.4–37.4)
MCV RBC AUTO: 87 FL (ref 82–98)
MUCOUS THREADS UR QL AUTO: ABNORMAL
NITRITE UR QL STRIP: NEGATIVE
NON-SQ EPI CELLS URNS QL MICRO: ABNORMAL /HPF
PH UR STRIP.AUTO: 5.5 [PH]
PHOSPHATE SERPL-MCNC: 3.7 MG/DL (ref 2.7–4.5)
PLATELET # BLD AUTO: 167 THOUSANDS/UL (ref 149–390)
PMV BLD AUTO: 10.1 FL (ref 8.9–12.7)
POTASSIUM SERPL-SCNC: 4.4 MMOL/L (ref 3.5–5.3)
PROT UR STRIP-MCNC: ABNORMAL MG/DL
RBC # BLD AUTO: 3.82 MILLION/UL (ref 3.88–5.62)
RBC #/AREA URNS AUTO: ABNORMAL /HPF
SODIUM 24H UR-SCNC: 108 MOL/L
SODIUM SERPL-SCNC: 143 MMOL/L (ref 135–147)
SP GR UR STRIP.AUTO: 1.02 (ref 1–1.03)
UNIT DISPENSE STATUS: NORMAL
UNIT PRODUCT CODE: NORMAL
UNIT PRODUCT VOLUME: 300 ML
UNIT PRODUCT VOLUME: 350 ML
UNIT RH: NORMAL
UROBILINOGEN UR STRIP-ACNC: <2 MG/DL
UUN 24H UR-MCNC: 748 MG/DL
WBC # BLD AUTO: 18.03 THOUSAND/UL (ref 4.31–10.16)
WBC #/AREA URNS AUTO: ABNORMAL /HPF

## 2024-04-06 PROCEDURE — 84540 ASSAY OF URINE/UREA-N: CPT | Performed by: INTERNAL MEDICINE

## 2024-04-06 PROCEDURE — NC001 PR NO CHARGE: Performed by: PSYCHIATRY & NEUROLOGY

## 2024-04-06 PROCEDURE — 99222 1ST HOSP IP/OBS MODERATE 55: CPT | Performed by: STUDENT IN AN ORGANIZED HEALTH CARE EDUCATION/TRAINING PROGRAM

## 2024-04-06 PROCEDURE — NC001 PR NO CHARGE: Performed by: SURGERY

## 2024-04-06 PROCEDURE — C9113 INJ PANTOPRAZOLE SODIUM, VIA: HCPCS | Performed by: PHYSICIAN ASSISTANT

## 2024-04-06 PROCEDURE — 99223 1ST HOSP IP/OBS HIGH 75: CPT | Performed by: PSYCHIATRY & NEUROLOGY

## 2024-04-06 PROCEDURE — 81001 URINALYSIS AUTO W/SCOPE: CPT | Performed by: INTERNAL MEDICINE

## 2024-04-06 PROCEDURE — 99024 POSTOP FOLLOW-UP VISIT: CPT | Performed by: SURGERY

## 2024-04-06 PROCEDURE — 84300 ASSAY OF URINE SODIUM: CPT | Performed by: INTERNAL MEDICINE

## 2024-04-06 PROCEDURE — 82948 REAGENT STRIP/BLOOD GLUCOSE: CPT

## 2024-04-06 PROCEDURE — 83605 ASSAY OF LACTIC ACID: CPT

## 2024-04-06 PROCEDURE — 97167 OT EVAL HIGH COMPLEX 60 MIN: CPT

## 2024-04-06 PROCEDURE — 97163 PT EVAL HIGH COMPLEX 45 MIN: CPT

## 2024-04-06 PROCEDURE — 82436 ASSAY OF URINE CHLORIDE: CPT | Performed by: INTERNAL MEDICINE

## 2024-04-06 PROCEDURE — 83735 ASSAY OF MAGNESIUM: CPT | Performed by: PHYSICIAN ASSISTANT

## 2024-04-06 PROCEDURE — 82570 ASSAY OF URINE CREATININE: CPT | Performed by: INTERNAL MEDICINE

## 2024-04-06 PROCEDURE — 80048 BASIC METABOLIC PNL TOTAL CA: CPT | Performed by: STUDENT IN AN ORGANIZED HEALTH CARE EDUCATION/TRAINING PROGRAM

## 2024-04-06 PROCEDURE — 85027 COMPLETE CBC AUTOMATED: CPT | Performed by: STUDENT IN AN ORGANIZED HEALTH CARE EDUCATION/TRAINING PROGRAM

## 2024-04-06 PROCEDURE — 84100 ASSAY OF PHOSPHORUS: CPT | Performed by: PHYSICIAN ASSISTANT

## 2024-04-06 RX ORDER — MAGNESIUM SULFATE HEPTAHYDRATE 40 MG/ML
2 INJECTION, SOLUTION INTRAVENOUS ONCE
Status: COMPLETED | OUTPATIENT
Start: 2024-04-06 | End: 2024-04-06

## 2024-04-06 RX ORDER — HYDROMORPHONE HCL/PF 1 MG/ML
0.5 SYRINGE (ML) INJECTION EVERY 2 HOUR PRN
Status: DISCONTINUED | OUTPATIENT
Start: 2024-04-06 | End: 2024-04-07

## 2024-04-06 RX ADMIN — METOCLOPRAMIDE HYDROCHLORIDE 10 MG: 5 INJECTION INTRAMUSCULAR; INTRAVENOUS at 17:30

## 2024-04-06 RX ADMIN — HEPARIN SODIUM 5000 UNITS: 5000 INJECTION INTRAVENOUS; SUBCUTANEOUS at 21:23

## 2024-04-06 RX ADMIN — ACETAMINOPHEN 1000 MG: 10 INJECTION INTRAVENOUS at 21:24

## 2024-04-06 RX ADMIN — LABETALOL HYDROCHLORIDE 10 MG: 5 INJECTION, SOLUTION INTRAVENOUS at 13:33

## 2024-04-06 RX ADMIN — METOCLOPRAMIDE HYDROCHLORIDE 10 MG: 5 INJECTION INTRAMUSCULAR; INTRAVENOUS at 12:04

## 2024-04-06 RX ADMIN — CEFAZOLIN SODIUM 1000 MG: 1 SOLUTION INTRAVENOUS at 04:25

## 2024-04-06 RX ADMIN — HEPARIN SODIUM 5000 UNITS: 5000 INJECTION INTRAVENOUS; SUBCUTANEOUS at 05:00

## 2024-04-06 RX ADMIN — METOCLOPRAMIDE HYDROCHLORIDE 10 MG: 5 INJECTION INTRAMUSCULAR; INTRAVENOUS at 05:01

## 2024-04-06 RX ADMIN — FENTANYL CITRATE: 0.05 INJECTION, SOLUTION INTRAMUSCULAR; INTRAVENOUS at 22:09

## 2024-04-06 RX ADMIN — HEPARIN SODIUM 5000 UNITS: 5000 INJECTION INTRAVENOUS; SUBCUTANEOUS at 13:37

## 2024-04-06 RX ADMIN — METOROPROLOL TARTRATE 2.5 MG: 5 INJECTION, SOLUTION INTRAVENOUS at 04:15

## 2024-04-06 RX ADMIN — FAMOTIDINE 20 MG: 10 INJECTION INTRAVENOUS at 08:05

## 2024-04-06 RX ADMIN — PANTOPRAZOLE SODIUM 40 MG: 40 INJECTION, POWDER, FOR SOLUTION INTRAVENOUS at 08:05

## 2024-04-06 RX ADMIN — ACETAMINOPHEN 1000 MG: 10 INJECTION INTRAVENOUS at 14:02

## 2024-04-06 RX ADMIN — LABETALOL HYDROCHLORIDE 10 MG: 5 INJECTION, SOLUTION INTRAVENOUS at 21:33

## 2024-04-06 RX ADMIN — SODIUM CHLORIDE, SODIUM LACTATE, POTASSIUM CHLORIDE, AND CALCIUM CHLORIDE 125 ML/HR: .6; .31; .03; .02 INJECTION, SOLUTION INTRAVENOUS at 06:15

## 2024-04-06 RX ADMIN — ACETAMINOPHEN 1000 MG: 10 INJECTION INTRAVENOUS at 05:01

## 2024-04-06 RX ADMIN — METOROPROLOL TARTRATE 2.5 MG: 5 INJECTION, SOLUTION INTRAVENOUS at 21:23

## 2024-04-06 RX ADMIN — FAMOTIDINE 20 MG: 10 INJECTION INTRAVENOUS at 21:24

## 2024-04-06 RX ADMIN — METOROPROLOL TARTRATE 2.5 MG: 5 INJECTION, SOLUTION INTRAVENOUS at 08:20

## 2024-04-06 RX ADMIN — LIDOCAINE 5% 2 PATCH: 700 PATCH TOPICAL at 08:20

## 2024-04-06 RX ADMIN — METOROPROLOL TARTRATE 2.5 MG: 5 INJECTION, SOLUTION INTRAVENOUS at 15:55

## 2024-04-06 RX ADMIN — MAGNESIUM SULFATE HEPTAHYDRATE 2 G: 40 INJECTION, SOLUTION INTRAVENOUS at 08:05

## 2024-04-06 NOTE — ASSESSMENT & PLAN NOTE
S/p open AAA repair with 18/x9 mm dacron bifurcated graft to R external iliac artery and L common iliac artery on 4/5  Thoracic epidural placed on 4/5, removed on 4/8    Plan:    Continu PO oxycodone 5/10mg q4hr PRN for mod-severe pain  Continue other MMA  PO Tylenol 975mg q8hr norma  NSAIDs contraindicated in setting of CKD  Encourage OOB, PT/OT  Bowel regimen per surgical team

## 2024-04-06 NOTE — ASSESSMENT & PLAN NOTE
Sourav Saul is a 59 y.o. male who presented as a elective repair for 7.5 cm AAA.  Status post surgery there were concerns about R leg and L arm sensory changes.  Patient has a past medical history of AAA s/p repair, BPH, chronic kidney disease, hyperlipidemia, and hypertension.      Impression: Patient is a 59-year-old male who presented to the hospital as a result of an elective surgery and after surgery, patient had some sensation changes for which neurology was consulted STAT.  Neurology recommended initiating a rapid response and a stroke alert but primary team did not feel strongly in regards to that.  At this time, patient's neurological examination is inconsistent with any stroke localization to brain or spine. Further imaging with MRI T-spine and L-spine with and without contrast were recommended.  Primary team did not feel strongly in regards to this and as a result, they canceled the STAT scheduled MRIs.  It was reported his symptoms improved.  On re-examination by neurology on 4/6 in a.m., the patient had no symptoms of weakness or numbness, no neurological complaints.  Examination was non focal to sensory or motor findings.     Plan:  Overnight team neurology team recommended MRI T-spine and L-spine with and without contrast  Cancelled by primary team  Examination and symptoms have improved, per attending no need for further imaging at this time.   Continue to monitor neurological examination  Optimize all stroke risk factors  PT/OT   Rest of care as per primary team  Notify neurology with reoccurrence of neurological symptoms.   Reviewed with attending Dr. Lopez
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
## 2024-04-06 NOTE — PROGRESS NOTES
Progress Note - Vascular Surgery   Sourav Saul 59 y.o. male MRN: [id number]  Unit/Bed#: [facility] 516-01 Encounter: [id number]    Assessment:  Sourav Saul is a 59 y.o. male hx of TBI noted to have 7.5cm AAA and B LINDA aneurysms.      4/5 - Open aortoiliac aneurysm repair with 18x9mm dacron bifurcated graft ([id number] repair w/ R-EIA, L-LINDA), suprarenal clamp time 23min     Plan:  Neuro - post-operative pain, baseline paresthesias from TBI   Noted to have RLE numbness and Left hand numbness/paresthesias overnight, currently resolved and at baseline, clinically does not exhibit symptoms consistent w/ SCI or arterial insufficiency   CT head negative   Neurology input appreciated  As symptoms have resolved/baseline, will defer MRI imaging at this time given low index of suspicion for SCI however will continue to monitor closely   Pain control w/ Ropivacaine/Fentanyl PCEA   Prn breakthrough   APS input appreciated   Consider epidural removal POD#3 if able   Continue frequent neurovascular checks   CV - AAA, b/l LINDA s/p OAR   Goal MAP > 65   S/p 8L crystalloid, 1L cell-saver, 1L albumin intra-op   Continue A-line monitoring   Resusc w/ LR @ 125cc/hr   Neurovascular checks   HTN - maintain SBP<180   Continue IV lopressor w/ hydralazine/labetalol prn   Home Toprol 25mg on hold   Aspirin/statin when able to tolerate PO   Monitor for fluid overload   Trend lactate, improving w/ resuscitation   Pulm   Monitor pulse ox  Supplemental O2 as needed, currently on RA   Encourage IS   GI - post-op ileus, monitor for colonic ischemia   Currently no evidence of colonic ischemia, awaiting return of bowel function   NPO w/ NGT, monitor output   Output 100cc/24hr   GI ppx    - baseline CKD3b   Supra-renal clamp 23 min, accessory renals off aneurysm sac without reimplantation    Trend Cr., currently elevated 2.62  Strict I/O   Maintain alfred  UO 1570cc/24hrs   Continue IVF   Heme - ABLA   Total transfusion 2u pRBC, 2u FFP   Trend  "H/H  11.1 this AM   DVT ppx   ID   Abx per SCIPS   Trend WBC, 18 this AM, no evidence of acute infection   Monitor fever curve   Endo   Glucose control   MSK  Encourage OOB/ambulation   PT/OT   F/E/N  Continue LR   Lytes per ICU team   NPO   Dispo - continue ICU level of care, critical care assistance appreciated     Subjective/Objective    Subjective: Overnight noted to have worsening paresthesias to right leg and left arm, CTH negative, neurology consulted and patient evaluated, symptoms with progressive improvement overnight, MRI considered however w/ regression of symptoms decision made to hold MRI (given potential risk of aspiration w/ lying flat for prolonged period w/ NGT in place off suction).  This AM patient states symptoms have resolved and he's currently at baseline.  Incisional pain is well controlled.  Has not been OOB.  No other complaints, denies f/c, HA, CP, SOB, n/v, new paresthesias, or motor/sensory dysfunction.      Objective:     Blood pressure 151/78, pulse 93, temperature 98.2 °F (36.8 °C), temperature source Oral, resp. rate 17, height 5' 10\" (1.778 m), weight 107 kg (235 lb 7.2 oz), SpO2 96%.,Body mass index is 33.78 kg/m².      Intake/Output Summary (Last 24 hours) at 4/6/2024 0700  Last data filed at 4/6/2024 0601  Gross per 24 hour   Intake 02000.81 ml   Output 4370 ml   Net 40975.81 ml       Physical Exam:   GEN: NAD  HEENT: NCAT, NGT w/ bilious drainage, R-IJ TLCVC   CV: RRR  Lung: Normal effort  Ab: Soft, NT/ND, midline incision C/D/I, alfred catheter in place   Extrem: right DP 1+, left DP 2+, motor sensation intact, compartments soft, mild edema (LUE>RUE)  Neuro: A+Ox3     Lab, Imaging and other studies:I have personally reviewed pertinent lab results.     VTE Pharmacologic Prophylaxis: Heparin  VTE Mechanical Prophylaxis: sequential compression device    Recent Results (from the past 36 hour(s))   ABORh Recheck - Contact Blood Bank Prior to Collection    Collection Time: 04/05/24  " 5:47 AM   Result Value Ref Range    ABO Grouping A     Rh Factor Positive    POCT Blood Gas (CG8+)    Collection Time: 04/05/24  8:36 AM   Result Value Ref Range    pH, Art i-STAT 7.322 (L) 7.350 - 7.450    pCO2, Art i-STAT 44.8 (H) 36.0 - 44.0 mm HG    pO2, ART i-STAT 130.0 (H) 75.0 - 129.0 mm HG    BE, i-STAT -3 (L) -2 - 3 mmol/L    HCO3, Art i-STAT 23.2 22.0 - 28.0 mmol/L    CO2, i-STAT 25 21 - 32 mmol/L    O2 Sat, i-STAT 99 (H) 60 - 85 %    SODIUM, I-STAT 141 136 - 145 mmol/l    Potassium, i-STAT 4.2 3.5 - 5.3 mmol/L    Calcium, Ionized i-STAT 1.26 1.12 - 1.32 mmol/L    Hct, i-STAT 38 36.5 - 49.3 %    Hgb, i-STAT 12.9 12.0 - 17.0 g/dl    Glucose, i-STAT 114 65 - 140 mg/dl    Specimen Type ARTERIAL    POCT Blood Gas (CG8+)    Collection Time: 04/05/24 10:05 AM   Result Value Ref Range    pH, Art i-STAT 7.353 7.350 - 7.450    pCO2, Art i-STAT 38.1 36.0 - 44.0 mm HG    pO2, ART i-STAT 209.0 (H) 75.0 - 129.0 mm HG    BE, i-STAT -4 (L) -2 - 3 mmol/L    HCO3, Art i-STAT 21.2 (L) 22.0 - 28.0 mmol/L    CO2, i-STAT 22 21 - 32 mmol/L    O2 Sat, i-STAT 100 (H) 60 - 85 %    SODIUM, I-STAT 139 136 - 145 mmol/l    Potassium, i-STAT 4.5 3.5 - 5.3 mmol/L    Calcium, Ionized i-STAT 1.15 1.12 - 1.32 mmol/L    Hct, i-STAT 34 (L) 36.5 - 49.3 %    Hgb, i-STAT 11.6 (L) 12.0 - 17.0 g/dl    Glucose, i-STAT 124 65 - 140 mg/dl    Specimen Type ARTERIAL    POCT activated clotting time    Collection Time: 04/05/24 10:43 AM   Result Value Ref Range    Activated Clotting Time, i-STAT 199 (H) 89 - 137 sec    Specimen Type ARTERIAL    POCT activated clotting time    Collection Time: 04/05/24 10:53 AM   Result Value Ref Range    Activated Clotting Time, i-STAT 222 (H) 89 - 137 sec    Specimen Type ARTERIAL    POCT activated clotting time    Collection Time: 04/05/24 11:35 AM   Result Value Ref Range    Activated Clotting Time, i-STAT 205 (H) 89 - 137 sec    Specimen Type ARTERIAL    POCT Blood Gas (CG8+)    Collection Time: 04/05/24 11:44 AM    Result Value Ref Range    pH, Art i-STAT 7.266 (L) 7.350 - 7.450    pCO2, Art i-STAT 39.0 36.0 - 44.0 mm HG    pO2, ART i-STAT 202.0 (H) 75.0 - 129.0 mm HG    BE, i-STAT -9 (L) -2 - 3 mmol/L    HCO3, Art i-STAT 17.8 (L) 22.0 - 28.0 mmol/L    CO2, i-STAT 19 (L) 21 - 32 mmol/L    O2 Sat, i-STAT 100 (H) 60 - 85 %    SODIUM, I-STAT 139 136 - 145 mmol/l    Potassium, i-STAT 4.8 3.5 - 5.3 mmol/L    Calcium, Ionized i-STAT 1.45 (H) 1.12 - 1.32 mmol/L    Hct, i-STAT 24 (L) 36.5 - 49.3 %    Hgb, i-STAT 8.2 (L) 12.0 - 17.0 g/dl    Glucose, i-STAT 127 65 - 140 mg/dl    Specimen Type ARTERIAL    POCT Blood Gas (CG8+)    Collection Time: 04/05/24 12:28 PM   Result Value Ref Range    pH, Art i-STAT 7.387 7.350 - 7.450    pCO2, Art i-STAT 33.2 (L) 36.0 - 44.0 mm HG    pO2, ART i-STAT 202.0 (H) 75.0 - 129.0 mm HG    BE, i-STAT -5 (L) -2 - 3 mmol/L    HCO3, Art i-STAT 19.9 (L) 22.0 - 28.0 mmol/L    CO2, i-STAT 21 21 - 32 mmol/L    O2 Sat, i-STAT 100 (H) 60 - 85 %    SODIUM, I-STAT 141 136 - 145 mmol/l    Potassium, i-STAT 4.8 3.5 - 5.3 mmol/L    Calcium, Ionized i-STAT 1.28 1.12 - 1.32 mmol/L    Hct, i-STAT 25 (L) 36.5 - 49.3 %    Hgb, i-STAT 8.5 (L) 12.0 - 17.0 g/dl    Glucose, i-STAT 131 65 - 140 mg/dl    Specimen Type ARTERIAL    POCT activated clotting time    Collection Time: 04/05/24 12:32 PM   Result Value Ref Range    Activated Clotting Time, i-STAT 188 (H) 89 - 137 sec    Specimen Type ARTERIAL    POCT activated clotting time    Collection Time: 04/05/24  1:14 PM   Result Value Ref Range    Activated Clotting Time, i-STAT 199 (H) 89 - 137 sec    Specimen Type ARTERIAL    POCT Blood Gas (CG8+)    Collection Time: 04/05/24  1:28 PM   Result Value Ref Range    pH, Art i-STAT 7.272 (L) 7.350 - 7.450    pCO2, Art i-STAT 43.0 36.0 - 44.0 mm HG    pO2, ART i-STAT 197.0 (H) 75.0 - 129.0 mm HG    BE, i-STAT -7 (L) -2 - 3 mmol/L    HCO3, Art i-STAT 19.9 (L) 22.0 - 28.0 mmol/L    CO2, i-STAT 21 21 - 32 mmol/L    O2 Sat, i-STAT 100  (H) 60 - 85 %    SODIUM, I-STAT 141 136 - 145 mmol/l    Potassium, i-STAT 5.0 3.5 - 5.3 mmol/L    Calcium, Ionized i-STAT 1.30 1.12 - 1.32 mmol/L    Hct, i-STAT 26 (L) 36.5 - 49.3 %    Hgb, i-STAT 8.8 (L) 12.0 - 17.0 g/dl    Glucose, i-STAT 145 (H) 65 - 140 mg/dl    Specimen Type ARTERIAL    POCT Blood Gas (CG8+)    Collection Time: 04/05/24  2:30 PM   Result Value Ref Range    pH, Art i-STAT 7.317 (L) 7.350 - 7.450    pCO2, Art i-STAT 39.0 36.0 - 44.0 mm HG    pO2, ART i-STAT 211.0 (H) 75.0 - 129.0 mm HG    BE, i-STAT -6 (L) -2 - 3 mmol/L    HCO3, Art i-STAT 19.9 (L) 22.0 - 28.0 mmol/L    CO2, i-STAT 21 21 - 32 mmol/L    O2 Sat, i-STAT 100 (H) 60 - 85 %    SODIUM, I-STAT 141 136 - 145 mmol/l    Potassium, i-STAT 5.1 3.5 - 5.3 mmol/L    Calcium, Ionized i-STAT 1.24 1.12 - 1.32 mmol/L    Hct, i-STAT 30 (L) 36.5 - 49.3 %    Hgb, i-STAT 10.2 (L) 12.0 - 17.0 g/dl    Glucose, i-STAT 141 (H) 65 - 140 mg/dl    Specimen Type ARTERIAL    CBC and differential    Collection Time: 04/05/24  3:11 PM   Result Value Ref Range    WBC 21.74 (H) 4.31 - 10.16 Thousand/uL    RBC 4.15 3.88 - 5.62 Million/uL    Hemoglobin 12.2 12.0 - 17.0 g/dL    Hematocrit 35.7 (L) 36.5 - 49.3 %    MCV 86 82 - 98 fL    MCH 29.4 26.8 - 34.3 pg    MCHC 34.2 31.4 - 37.4 g/dL    RDW 13.2 11.6 - 15.1 %    MPV 9.9 8.9 - 12.7 fL    Platelets 171 149 - 390 Thousands/uL    nRBC 0 /100 WBCs    Neutrophils Relative 85 (H) 43 - 75 %    Immature Grans % 1 0 - 2 %    Lymphocytes Relative 8 (L) 14 - 44 %    Monocytes Relative 6 4 - 12 %    Eosinophils Relative 0 0 - 6 %    Basophils Relative 0 0 - 1 %    Neutrophils Absolute 18.34 (H) 1.85 - 7.62 Thousands/µL    Absolute Immature Grans 0.29 (H) 0.00 - 0.20 Thousand/uL    Absolute Lymphocytes 1.76 0.60 - 4.47 Thousands/µL    Absolute Monocytes 1.25 (H) 0.17 - 1.22 Thousand/µL    Eosinophils Absolute 0.03 0.00 - 0.61 Thousand/µL    Basophils Absolute 0.07 0.00 - 0.10 Thousands/µL   Basic metabolic panel    Collection  Time: 04/05/24  3:11 PM   Result Value Ref Range    Sodium 142 135 - 147 mmol/L    Potassium 5.0 3.5 - 5.3 mmol/L    Chloride 113 (H) 96 - 108 mmol/L    CO2 18 (L) 21 - 32 mmol/L    ANION GAP 11 4 - 13 mmol/L    BUN 24 5 - 25 mg/dL    Creatinine 2.15 (H) 0.60 - 1.30 mg/dL    Glucose 130 65 - 140 mg/dL    Calcium 8.0 (L) 8.4 - 10.2 mg/dL    eGFR 32 ml/min/1.73sq m   Magnesium    Collection Time: 04/05/24  3:11 PM   Result Value Ref Range    Magnesium 1.3 (L) 1.9 - 2.7 mg/dL   Protime-INR    Collection Time: 04/05/24  3:11 PM   Result Value Ref Range    Protime 16.3 (H) 11.6 - 14.5 seconds    INR 1.33 (H) 0.84 - 1.19   Blood gas, arterial    Collection Time: 04/05/24  3:11 PM   Result Value Ref Range    pH, Arterial 7.320 (L) 7.350 - 7.450    PH ART TC 7.321 (L) 7.350 - 7.450    pCO2, Arterial 37.8 36.0 - 44.0 mm Hg    PCO2 (TC) Arterial 37.6 36.0 - 44.0 mm Hg    pO2, Arterial 128.8 75.0 - 129.0 mm Hg    PO2 (TC) Arterial 128.2 75.0 - 129.0 mm Hg    HCO3, Arterial 19.0 (L) 22.0 - 28.0 mmol/L    Base Excess, Arterial -6.4 mmol/L    O2 Content, Arterial 17.6 16.0 - 23.0 mL/dL    O2 HGB,Arterial  96.8 94.0 - 97.0 %    SOURCE Line, Arterial     Temperature 98.4 Degrees Fehrenheit    Non Vent Type- Aerosol Mask Aerosol Mask    Phosphorus    Collection Time: 04/05/24  3:11 PM   Result Value Ref Range    Phosphorus 4.1 2.7 - 4.5 mg/dL   Lactic acid, plasma (w/reflex if result > 2.0)    Collection Time: 04/05/24  3:11 PM   Result Value Ref Range    LACTIC ACID 4.6 (HH) 0.5 - 2.0 mmol/L   Fingerstick Glucose (POCT)    Collection Time: 04/05/24  3:12 PM   Result Value Ref Range    POC Glucose 134 65 - 140 mg/dl   ECG 12 lead    Collection Time: 04/05/24  4:14 PM   Result Value Ref Range    Ventricular Rate 85 BPM    Atrial Rate 85 BPM    NV Interval 150 ms    QRSD Interval 92 ms    QT Interval 346 ms    QTC Interval 412 ms    P Axis 34 degrees    QRS Axis 69 degrees    T Wave Axis 45 degrees   Calcium, ionized    Collection  Time: 04/05/24  6:03 PM   Result Value Ref Range    Calcium, Ionized 1.15 1.12 - 1.32 mmol/L   Fingerstick Glucose (POCT)    Collection Time: 04/05/24  7:09 PM   Result Value Ref Range    POC Glucose 122 65 - 140 mg/dl   Lactic acid 2 Hours    Collection Time: 04/05/24  8:34 PM   Result Value Ref Range    LACTIC ACID 3.2 (HH) 0.5 - 2.0 mmol/L   Lactic acid, plasma (w/reflex if result > 2.0)    Collection Time: 04/05/24 11:16 PM   Result Value Ref Range    LACTIC ACID 3.1 (HH) 0.5 - 2.0 mmol/L   Fingerstick Glucose (POCT)    Collection Time: 04/06/24 12:02 AM   Result Value Ref Range    POC Glucose 134 65 - 140 mg/dl   Lactic acid 2 Hours    Collection Time: 04/06/24  1:55 AM   Result Value Ref Range    LACTIC ACID 2.6 (HH) 0.5 - 2.0 mmol/L   Lactic acid, plasma (w/reflex if result > 2.0)    Collection Time: 04/06/24  4:31 AM   Result Value Ref Range    LACTIC ACID 2.1 (HH) 0.5 - 2.0 mmol/L   Basic Metabolic Panel    Collection Time: 04/06/24  4:41 AM   Result Value Ref Range    Sodium 143 135 - 147 mmol/L    Potassium 4.4 3.5 - 5.3 mmol/L    Chloride 110 (H) 96 - 108 mmol/L    CO2 22 21 - 32 mmol/L    ANION GAP 11 4 - 13 mmol/L    BUN 34 (H) 5 - 25 mg/dL    Creatinine 2.62 (H) 0.60 - 1.30 mg/dL    Glucose 170 (H) 65 - 140 mg/dL    Calcium 7.6 (L) 8.4 - 10.2 mg/dL    eGFR 25 ml/min/1.73sq m   CBC and Platelet    Collection Time: 04/06/24  4:41 AM   Result Value Ref Range    WBC 18.03 (H) 4.31 - 10.16 Thousand/uL    RBC 3.82 (L) 3.88 - 5.62 Million/uL    Hemoglobin 11.1 (L) 12.0 - 17.0 g/dL    Hematocrit 33.1 (L) 36.5 - 49.3 %    MCV 87 82 - 98 fL    MCH 29.1 26.8 - 34.3 pg    MCHC 33.5 31.4 - 37.4 g/dL    RDW 13.7 11.6 - 15.1 %    Platelets 167 149 - 390 Thousands/uL    MPV 10.1 8.9 - 12.7 fL   Magnesium    Collection Time: 04/06/24  4:41 AM   Result Value Ref Range    Magnesium 1.8 (L) 1.9 - 2.7 mg/dL   Phosphorus    Collection Time: 04/06/24  4:41 AM   Result Value Ref Range    Phosphorus 3.7 2.7 - 4.5 mg/dL    Fingerstick Glucose (POCT)    Collection Time: 04/06/24  5:13 AM   Result Value Ref Range    POC Glucose 138 65 - 140 mg/dl   Prepare Leukoreduced RBC: 4 Units    Collection Time: 04/06/24  5:55 AM   Result Value Ref Range    Unit Product Code H2171J92     Unit Number [id number]     Unit ABO A     Unit RH POS     Crossmatch Compatible     Unit Dispense Status Crossmatched     Unit Product Volume 350 mL    Unit Product Code P6933T27     Unit Number [id number]     Unit ABO A     Unit RH POS     Crossmatch Compatible     Unit Dispense Status Presumed Trans     Unit Product Volume 300 ml    Unit Product Code K1020K12     Unit Number [id number]     Unit ABO A     Unit RH POS     Crossmatch Compatible     Unit Dispense Status Presumed Trans     Unit Product Volume 300 ml    Unit Product Code K2605L91     Unit Number [id number]     Unit ABO A     Unit RH POS     Crossmatch Compatible     Unit Dispense Status Crossmatched     Unit Product Volume 300 ml   Prepare Plasma: 4 Units    Collection Time: 04/06/24  5:55 AM   Result Value Ref Range    Unit Product Code A9969H80     Unit Number [id number]     Unit ABO A     Unit RH POS     Unit Dispense Status Presumed Trans     Unit Product Volume 280 ml    Unit Product Code Z9256L46     Unit Number [id number]     Unit ABO A     Unit RH POS     Unit Dispense Status Crossmatched     Unit Product Volume 280 ml    Unit Product Code X8290S04     Unit Number [id number]     Unit ABO A     Unit RH NEG     Unit Dispense Status Crossmatched     Unit Product Volume 250 ml    Unit Product Code Y3399R02     Unit Number [id number]     Unit ABO A     Unit RH NEG     Unit Dispense Status Presumed Trans     Unit Product Volume 280 ml

## 2024-04-06 NOTE — PROGRESS NOTES
Vascular Surgery    Reevaluated patient for RLE numbness and Left hand numbness. CTH negative.    The patient verbalizes the RLE numbness is now at his baseline for neuropathy that was present prior to admission and is almost completely resolved from what he was experiencing on arrival to the ICU. His Left hand numbness is also improved with exception to localized numbness of the Left thumb.    He now c/o difficultly breathing related to NGT in place and being uncomfortable from his lines.    AVSS    Lactate 3.1 from 4.6    Exam:  Neuro: CN II-XII are grossly intact, Motor and sensory are 5/5 in all extremities.  Heart: RRR, S1/S2 nl  Lungs: CTA B  ABD: midline incision dressing CDI, soft, mild dist, mild tender    Plan:  Pt symptoms markedly improved from immediately post-op, no clinical exam finding for acute ischemic or neurologic injury and CTH neg. Left hand symptoms likely related to post-op edema/resuscitation and OR positioning.   Will hold off on stat MRI of spine   Continue ICU management and monitoring  D/W Critical care team    Jsolyn Moreno  4/6/2024
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
## 2024-04-06 NOTE — UTILIZATION REVIEW
Initial Clinical Review    Elective Surgical surgical procedure  Age/Sex: 59 y.o. male  Surgery Date: 4/5/24  Procedure: Open aortoiliac aneurysm repair with 18x9mm dacron bifurcated graft  Aorta-R EIA-L LINDA  Anesthesia: General w/ Epidural   Operative Findings: Multiple renal arteries:  R main at the aortic neck and R accessory at the level of the KEELEY  Two renals on the L at the level of the aortic neck, the proximal is the main, the distal appears to be occluded with no backbleeding or signal and could not be well visualized on preop CT scan  Large abdominal aortic aneurysm  On the right side, the iliac bifurcation was poor tissue quality and very deep in the pelvis; this was ultimately ligated and the anastomosis was to the external iliac artery  On the left, the anastomosis was to the common iliac artery with residual 2.3cm L LINDA aneurysm distally; however the bifurcation was very difficult to reach and we were able to preserve the internal iliac artery  A circumaortic left renal vein was noted on preoperative CT scan    4/5 Per Vascular Sx Note: Concern for RLE numbness in ICU. Exam w/ M/S intact and DP/PT doppler signals. Pt reports numb sesnation in RLE and L Hand. Low suspicion for acute CVA, suspect related to nerve retraction/irritation or operative positioning. Will monitor. Otherwise, doing well post-op AVSS, pain controlled.  -Anesthesia also at bedside for evaluation due to indwelling epidural and neurology was called for consultation and CTH pending.  -Continue ICU management and monitoring.     POD#1 Progress Note: Pt symptoms markedly improved from immediately post-op, no clinical exam finding for acute ischemic or neurologic injury and CTH neg. Left hand symptoms likely related to post-op edema/resuscitation and OR positioning. Will hold off on stat MRI of spine. He now c/o difficultly breathing related to NGT in place and being uncomfortable from his lines. Continue ICU management and monitoring.  "    Admission Orders: Date/Time/Statement:   Admission Orders (From admission, onward)       Ordered        04/05/24 1442  Inpatient Admission  Once                          Orders Placed This Encounter   Procedures    Inpatient Admission     Standing Status:   Standing     Number of Occurrences:   1     Order Specific Question:   Level of Care     Answer:   Critical Care [15]     Order Specific Question:   Estimated length of stay     Answer:   Inpatient Only Surgery     Vital Signs: /85 (BP Location: Left arm)   Pulse 91   Temp 98.1 °F (36.7 °C) (Oral)   Resp 16   Ht 5' 10\" (1.778 m)   Wt 107 kg (235 lb 7.2 oz)   SpO2 96%   BMI 33.78 kg/m²     Pertinent Labs/Diagnostic Test Results:   CT head wo contrast   Final Result by Scooby Del Rosario MD (04/05 2240)      Senescent changes and encephalomalacia as described above. No acute intracranial hemorrhage or other significant acute abnormalities identified. No depressed calvarial fracture. Paranasal sinus disease.                  Workstation performed: [id number]         XR chest portable   Final Result by Makenna Diaz MD (04/05 1652)      Right jugular introducer at cavoatrial junction with no pneumothorax.            Workstation performed: [id number]               Results from last 7 days   Lab Units 04/06/24 0441 04/05/24  1511 04/05/24  1430 04/05/24  1328 04/05/24  1228   WBC Thousand/uL 18.03* 21.74*  --   --   --    HEMOGLOBIN g/dL 11.1* 12.2  --   --   --    I STAT HEMOGLOBIN g/dl  --   --  10.2* 8.8* 8.5*   HEMATOCRIT % 33.1* 35.7*  --   --   --    HEMATOCRIT, ISTAT %  --   --  30* 26* 25*   PLATELETS Thousands/uL 167 171  --   --   --    NEUTROS ABS Thousands/µL  --  18.34*  --   --   --          Results from last 7 days   Lab Units 04/06/24 0441 04/05/24  1803 04/05/24  1511 04/05/24  1430 04/05/24  1328 04/05/24  1228 04/05/24  1144   SODIUM mmol/L 143  --  142  --   --   --   --    POTASSIUM mmol/L 4.4  --  5.0  --   --   --   --  "   CHLORIDE mmol/L 110*  --  113*  --   --   --   --    CO2 mmol/L 22  --  18*  --   --   --   --    CO2, I-STAT mmol/L  --   --   --  21 21 21 19*   ANION GAP mmol/L 11  --  11  --   --   --   --    BUN mg/dL 34*  --  24  --   --   --   --    CREATININE mg/dL 2.62*  --  2.15*  --   --   --   --    EGFR ml/min/1.73sq m 25  --  32  --   --   --   --    CALCIUM mg/dL 7.6*  --  8.0*  --   --   --   --    CALCIUM, IONIZED mmol/L  --  1.15  --   --   --   --   --    CALCIUM, IONIZED, ISTAT mmol/L  --   --   --  1.24 1.30 1.28 1.45*   MAGNESIUM mg/dL 1.8*  --  1.3*  --   --   --   --    PHOSPHORUS mg/dL 3.7  --  4.1  --   --   --   --          Results from last 7 days   Lab Units 04/06/24  1154 04/06/24  0513 04/06/24  0002 04/05/24  1909 04/05/24  1512   POC GLUCOSE mg/dl 124 138 134 122 134     Results from last 7 days   Lab Units 04/06/24  0441 04/05/24  1511   GLUCOSE RANDOM mg/dL 170* 130      Results from last 7 days   Lab Units 04/05/24  1511   PH ART  7.320*   PCO2 ART mm Hg 37.8   PO2 ART mm Hg 128.8   HCO3 ART mmol/L 19.0*   BASE EXC ART mmol/L -6.4   O2 CONTENT ART mL/dL 17.6   O2 HGB, ARTERIAL % 96.8   ABG SOURCE  Line, Arterial         Results from last 7 days   Lab Units 04/05/24  1430 04/05/24  1328 04/05/24  1228   I STAT BASE EXC mmol/L -6* -7* -5*   I STAT O2 SAT % 100* 100* 100*   ISTAT PH ART  7.317* 7.272* 7.387   I STAT ART PCO2 mm HG 39.0 43.0 33.2*   I STAT ART PO2 mm .0* 197.0* 202.0*   I STAT ART HCO3 mmol/L 19.9* 19.9* 19.9*     Results from last 7 days   Lab Units 04/05/24  1511   PROTIME seconds 16.3*   INR  1.33*     Results from last 7 days   Lab Units 04/06/24  0754 04/06/24  0431 04/06/24  0155 04/05/24  2316 04/05/24  2034 04/05/24  1511   LACTIC ACID mmol/L 1.7 2.1* 2.6* 3.1* 3.2* 4.6*     Results from last 7 days   Lab Units 04/06/24  0726 04/06/24  0555   UNIT PRODUCT CODE  [id number]  [id number]  [id number]  [id number]  [id number]  I0277R04  T7838D54   UNIT NUMBER   [id number]  [id number]  [id number]  [id number]  [id number]  [id number]  [id number]   UNITABO  A  A  A  A A  A  A  A   UNITRH  POS  POS  POS  POS POS  POS  NEG  NEG   CROSSMATCH  Compatible  Compatible  Compatible  Compatible  --    UNIT DISPENSE STATUS  Return to Inv  Presumed Trans  Presumed Trans  Return to Inv Presumed Trans  Crossmatched  Crossmatched  Presumed Trans   UNIT PRODUCT VOL ml 350  300  300  300 280  280  250  280     Diet: NPO  Mobility: OOB  DVT Prophylaxis: heparin, scd, ambulation    Medications/Pain Control:   Scheduled Medications:  acetaminophen, 1,000 mg, Intravenous, Q8H MARIA  famotidine, 20 mg, Intravenous, Q12H MARIA  heparin (porcine), 5,000 Units, Subcutaneous, Q8H MARIA  lidocaine, 2 patch, Topical, Daily  metoclopramide, 10 mg, Intravenous, Q6H MARIA  metoprolol, 2.5 mg, Intravenous, Q6H  pantoprazole, 40 mg, Intravenous, Daily      Continuous IV Infusions:  ropivacaine 0.1% and fentaNYL 2 mcg/mL, , Epidural, Continuous      PRN Meds:  diphenhydrAMINE, 25 mg, Intravenous, Q6H PRN  hydrALAZINE, 10 mg, Intravenous, Q6H PRN  HYDROmorphone, 0.5 mg, Intravenous, Q1H PRN  labetalol, 10 mg, Intravenous, Q6H PRN  lactated ringers, 500 mL, Intravenous, Once PRN   And  lactated ringers, 500 mL, Intravenous, Once PRN  metoprolol, 5 mg, Intravenous, Q6H PRN  sodium chloride, 500 mL, Intravenous, Once PRN   And  sodium chloride, 500 mL, Intravenous, Once PRN        Network Utilization Review Department  ATTENTION: Please call with any questions or concerns to [phone] and carefully listen to the prompts so that you are directed to the right person. All voicemails are confidential.   For Discharge needs, contact Care Management DC Support Team at [phone] opt. 2  Send all requests for admission clinical reviews, approved or denied determinations and any other requests to dedicated fax number below belonging to the  [place] where the patient is receiving treatment. List of dedicated fax numbers for the Facilities:  FACILITY NAME UR FAX NUMBER   ADMISSION DENIALS (Administrative/Medical Necessity) [phone]   DISCHARGE SUPPORT TEAM (NETWORK) [phone]   PARENT CHILD HEALTH (Maternity/NICU/Pediatrics) [phone]   [facility] [phone]   [facility] [phone]   [facility] [phone]   [facility] [phone]   Count includes the [facility] [phone]   [facility] [phone]   [facility] [phone]   [facility] [phone]   [facility] [phone]   [facility] [phone]   [facility] [phone]   [facility] [phone]

## 2024-04-06 NOTE — QUICK NOTE
I personally updated patient's mother bedside regarding his stable labs and vitals, NPO until consistent bowel return, pain regimen. She asked questions regarding the surgery, cause of need for surgery and discharge planning. All questions answered thoroughly. She expressed appreciation and understanding for care.
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
## 2024-04-06 NOTE — PROGRESS NOTES
"Post-op Check - Vascular Surgery     Assessment:  57 y/o M w/ 7.5cm infrarenal AAA presents for elective repair    S/p Open aortoiliac aneurysm repair, Aorta-R EIA-L LINDA 4/5    Plan:  -Concern for RLE numbness in ICU. Exam w/ M/S intact and DP/PT doppler signals. Pt reports numb sesnation in RLE and L Hand. Low suspicion for acute CVA, suspect related to nerve retraction/irritation or operative positioning. Will monitor. Otherwise, doing well post-op AVSS, pain controlled.  -Anesthesia also at bedside for evaluation due to indwelling epidural and neurology was called for consultation and CTH pending.  -Continue ICU management and monitoring.   -D/W Dr. Lewis    Subjective:  C/O numb sensation of RLE and ulnar side of left hand    Vitals:  BP (!) 205/93 Comment: labetalol given  Pulse 78   Temp 97.5 °F (36.4 °C) (Oral)   Resp 14   Ht 5' 10\" (1.778 m)   Wt 90.7 kg (200 lb)   SpO2 100%   BMI 28.70 kg/m²     I/Os:  I/O last 3 completed shifts:  In: 25095.9 [I.V.:8415.9; Blood:1200; IV Piggyback:1150]  Out: 3240 [Urine:540; Blood:2700]  I/O this shift:  In: -   Out: 150 [Urine:100; Emesis/NG output:50]    Lab Results and Cultures:   Lab Results   Component Value Date    WBC 21.74 (H) 04/05/2024    HGB 12.2 04/05/2024    HCT 35.7 (L) 04/05/2024    MCV 86 04/05/2024     04/05/2024     Lab Results   Component Value Date    GLUCOSE 141 (H) 04/05/2024    CALCIUM 8.0 (L) 04/05/2024    K 5.0 04/05/2024    CO2 18 (L) 04/05/2024     (H) 04/05/2024    BUN 24 04/05/2024    CREATININE 2.15 (H) 04/05/2024     Lab Results   Component Value Date    INR 1.33 (H) 04/05/2024    INR 0.98 03/12/2024    PROTIME 16.3 (H) 04/05/2024    PROTIME 13.4 03/12/2024        Blood Culture: No results found for: \"BLOODCX\",   Urinalysis:   Lab Results   Component Value Date    COLORU Yellow 01/23/2024    COLORU yellow 01/23/2024    CLARITYU Clear 01/23/2024    CLARITYU clear 01/23/2024    SPECGRAV 1.021 01/23/2024    PHUR 6.0 " "01/23/2024    LEUKOCYTESUR Negative 01/23/2024    LEUKOCYTESUR - 01/23/2024    NITRITE Negative 01/23/2024    NITRITE - 01/23/2024    GLUCOSEU Negative 01/23/2024    GLUCOSEU - 01/23/2024    KETONESU Negative 01/23/2024    KETONESU - 01/23/2024    BILIRUBINUR Negative 01/23/2024    BILIRUBINUR - 01/23/2024    BLOODU Negative 01/23/2024    BLOODU moderate 01/23/2024   ,   Urine Culture: No results found for: \"URINECX\",   Wound Culure: No results found for: \"WOUNDCULT\"    Medications:  Current Facility-Administered Medications   Medication Dose Route Frequency    acetaminophen (Ofirmev) injection 1,000 mg  1,000 mg Intravenous Q8H MARIA    ceFAZolin (ANCEF) IVPB (premix in dextrose) 1,000 mg 50 mL  1,000 mg Intravenous Q8H    diphenhydrAMINE (BENADRYL) injection 25 mg  25 mg Intravenous Q6H PRN    Famotidine (PF) (PEPCID) injection 20 mg  20 mg Intravenous Q12H MARIA    heparin (porcine) subcutaneous injection 5,000 Units  5,000 Units Subcutaneous Q8H MARIA    HYDROmorphone (DILAUDID) injection 0.5 mg  0.5 mg Intravenous Q1H PRN    labetalol (NORMODYNE) injection 10 mg  10 mg Intravenous Q6H PRN    lactated ringers bolus 500 mL  500 mL Intravenous Once PRN    And    lactated ringers bolus 500 mL  500 mL Intravenous Once PRN    lactated ringers infusion  125 mL/hr Intravenous Continuous    lidocaine (LIDODERM) 5 % patch 2 patch  2 patch Topical Daily    magnesium sulfate 2 g/50 mL IVPB (premix) 2 g  2 g Intravenous Once    metoclopramide (REGLAN) injection 10 mg  10 mg Intravenous Q6H MARIA    metoprolol (LOPRESSOR) injection 2.5 mg  2.5 mg Intravenous Q6H    metoprolol (LOPRESSOR) injection 5 mg  5 mg Intravenous Q6H PRN    ropivacaine 0.1% and fentaNYL 2 mcg/mL PCEA   Epidural Continuous    sodium chloride 0.9 % bolus 500 mL  500 mL Intravenous Once PRN    And    sodium chloride 0.9 % bolus 500 mL  500 mL Intravenous Once PRN       Physical Exam:    General appearance: alert and oriented, in no acute distress  Neurologic: " Mental status: Alert, oriented, thought content appropriate  Cranial nerves: normal  Sensory: normal  Motor: grossly normal  Lungs: clear to auscultation bilaterally  Heart: regular rate and rhythm, S1, S2 normal, no murmur, click, rub or gallop  Abdomen: soft, mild distension, mildly tender, incision CDI  Extremities: extremities normal, warm and well-perfused; no cyanosis, clubbing, or edema      Pulse exam:  DP: Right: doppler signal Left: doppler signal  PT: Right: doppler signal Left: doppler signal    Joslyn Moreno
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
## 2024-04-06 NOTE — PROGRESS NOTES
[facility]  Progress Note: Critical Care  Name: Sourav Saul 59 y.o. male I MRN: [id number]  Unit/Bed#: [facility] 516-01 I Date of Admission: 4/5/2024   Date of Service: 4/6/2024 I Hospital Day: 1    Assessment/Plan   Neuro:   Diagnosis: Acute post-op pain  Plan: Ropivacaine/Fentanyl PCEA in place, appreciate acute pain's assistance  Tylenol 1 g IV q8h  Dilaudid 0.5 mg IV q1h PRN   Lidoderm patches   Diagnosis: Paresthesias   Plan: Neurology consulted, appreciate their recommendations  4/5 CTH: no acute findings  Recommended MRI T/L spine but improved symptoms so canceled for now  Continue close neurological monitoring  Hx of TBI likely contributing to inconsistent history     CV:   Diagnosis: Abdominal aortic aneurysm, iliac artery aneurysm  Plan: 4/5 Open AAA repair with 18/x9 mm dacron bifurcated graft to R external iliac artery and L common iliac artery  Vascular surgery primary   Restart aspirin and atorvastatin when able to tolerate PO   Q1h neurovascular checks   Diagnosis: Hypertension  Plan: Home medications - Toprol-XL 25 mg daily, lisinopril 20 mg daily  Metoprolol 2.5 mg IV q6h - increase to 5mg  Hydralazine and labetalol PRN for goal SBP < 180 mmHg  Diagnosis: Hyperlipidemia   Plan: Home atorvastatin and vascepa currently on hold    Pulm:  Diagnosis: Acute post-operative respiratory insufficiency   Plan: Resolved, now on room air  IS, OOB    GI:   Diagnosis: At risk for post-op ileus   Plan: NGT/NPO  Reglan 10 mg IV q6h   Pepcid 20 mg IV q12h  Protonix 40 mg IV daily    :   Diagnosis: CKD stage 3b  Plan: Baseline Cr 2.0  At increased risk for post-operative RAQUEL given aberrant renal vessels off the aneurysm as well as supra-renal clamp time  Trend renal function  Strict I/Os  Consider nephrology consult if renal function worsens    Diagnosis: BPH   Plan: Home flomax on hold  Maintain alfred until able to tolerate PO medications    F/E/N:   LR at 125 ml/hr  Replete  electrolytes as needed  Lactic acidosis improving   NPO/NGT - diet per primary team     Heme/Onc:   Diagnosis: Acute blood loss anemia  Plan: Intra-op blood products - 2 PRBC, 2 FFP  DVT PPx: SQH     Endo:   Diagnosis: Pre-diabetes  Plan: HbA1c 5.8%  Goal blood glucose 140-180  Q6h accuchecks    ID:   Trend WBC/fever curve    MSK/Skin:   PT/OT when appropriate    Disposition: Critical care    ICU Core Measures     A: Assess, Prevent, and Manage Pain Has pain been assessed? Yes  Need for changes to pain regimen? No   B: Both SAT/SAT  N/A   C: Choice of Sedation RASS Goal: N/A patient not on sedation  Need for changes to sedation or analgesia regimen? No   D: Delirium CAM-ICU: Negative   E: Early Mobility  Plan for early mobility? Yes   F: Family Engagement Plan for family engagement today? Yes       Antibiotic Review: Post op requirements     Review of Invasive Devices:    Recio Plan: Continue for accurate I/O monitoring for 48 hours  Central access plan: Hemodynamic monitoring  Sylvia Plan: Keep arterial line for hemodynamic monitoring    Prophylaxis:  VTE VTE covered by:  heparin (porcine), Subcutaneous, 5,000 Units at 04/05/24 2112       Stress Ulcer  covered byFamotidine (PF) (PEPCID) injection 20 mg [[id number]], pantoprazole (PROTONIX) injection 40 mg [[id number]]        Significant 24hr Events     24hr events: POD # 1 s/p open AAA repair. Complained of decreased sensation in RLE overnight. Neurology consulted - CT obtained without any acute findings. With subsequent exams patient had varying complains of paraesthesias. Exam non-focal throughout. This AM, LE sensations have resolved. Complains only of left thumb numbness. Was hypertensive requiring PRN antihypertensives.      Subjective     Review of Systems   Constitutional:  Positive for fatigue.   Respiratory:  Negative for shortness of breath.    Cardiovascular:  Negative for chest pain.   Gastrointestinal:  Positive for abdominal pain. Negative for nausea.    Musculoskeletal:  Negative for back pain.   Neurological:  Positive for numbness (Left thumb). Negative for weakness.        Objective                            Vitals I/O      Most Recent Min/Max in 24hrs   Temp 97.5 °F (36.4 °C) Temp  Min: 97.5 °F (36.4 °C)  Max: 98.4 °F (36.9 °C)   Pulse 93 Pulse  Min: 63  Max: 93   Resp 15 Resp  Min: 13  Max: 21   /70 BP  Min: 118/82  Max: 205/93   O2 Sat 92 % SpO2  Min: 92 %  Max: 100 %      Intake/Output Summary (Last 24 hours) at 4/6/2024 0442  Last data filed at 4/6/2024 0401  Gross per 24 hour   Intake 33476.76 ml   Output 4145 ml   Net 54937.76 ml       Diet NPO    Invasive Monitoring   Arterial Line  [place] /101  Arterial Line BP  Min: 118/62  Max: 165/92    mmHg  Arterial Line MAP (mmHg)  Min: 82 mmHg  Max: 133 mmHg           Physical Exam   Physical Exam  HENT:      Nose: Nasogastric tube present.      Mouth/Throat:      Mouth: Mucous membranes are dry.   Neck:      Vascular: Central line present.   Cardiovascular:      Rate and Rhythm: Normal rate and regular rhythm.      Pulses:           Dorsalis pedis pulses are 1+ on the right side and 2+ on the left side.   Musculoskeletal:      Right lower leg: No edema.      Left lower leg: No edema.   Abdominal: General: Bowel sounds are decreased. There is distension.     Palpations: Abdomen is soft.      Tenderness: There is no abdominal tenderness.      Comments: Midline abdominal incision C/D/I   Constitutional:       General: He is not in acute distress.  Pulmonary:      Effort: Pulmonary effort is normal.      Breath sounds: No wheezing, rhonchi or rales.   Neurological:      General: No focal deficit present.      Mental Status: He is alert and oriented to person, place and time.      Comments: Poor historian    Genitourinary/Anorectal:  Recio present.          Diagnostic Studies      EKG: NSR  Imaging: CTH I have personally reviewed pertinent reports.   and I have personally reviewed pertinent films  in PACS     Medications:  Scheduled PRN   acetaminophen, 1,000 mg, Q8H MARIA  cefazolin, 1,000 mg, Q8H  famotidine, 20 mg, Q12H MARIA  heparin (porcine), 5,000 Units, Q8H MARIA  lidocaine, 2 patch, Daily  metoclopramide, 10 mg, Q6H MARIA  metoprolol, 2.5 mg, Q6H  pantoprazole, 40 mg, Daily      diphenhydrAMINE, 25 mg, Q6H PRN  hydrALAZINE, 10 mg, Q6H PRN  HYDROmorphone, 0.5 mg, Q1H PRN  labetalol, 10 mg, Q6H PRN  lactated ringers, 500 mL, Once PRN   And  lactated ringers, 500 mL, Once PRN  metoprolol, 5 mg, Q6H PRN  sodium chloride, 500 mL, Once PRN   And  sodium chloride, 500 mL, Once PRN       Continuous    lactated ringers, 125 mL/hr, Last Rate: 125 mL/hr (04/05/24 1508)  ropivacaine 0.1% and fentaNYL 2 mcg/mL,          Labs:    CBC    Recent Labs     04/05/24  1430 04/05/24  1511   WBC  --  21.74*   HGB 10.2* 12.2   HCT 30* 35.7*   PLT  --  171     BMP    Recent Labs     04/05/24  1430 04/05/24  1511   SODIUM  --  142   K  --  5.0   CL  --  113*   CO2 21 18*   AGAP  --  11   BUN  --  24   CREATININE  --  2.15*   CALCIUM  --  8.0*       Coags    Recent Labs     04/05/24  1511   INR 1.33*        Additional Electrolytes  Recent Labs     04/05/24  1430 04/05/24  1511 04/05/24  1803   MG  --  1.3*  --    PHOS  --  4.1  --    CAIONIZED 1.24  --  1.15          Blood Gas    Recent Labs     04/05/24  1511   PHART 7.320*   [id number] 37.8   PO2ART 128.8   [id number] 19.0*   BEART -6.4   SOURCE Line, Arterial     Recent Labs     04/05/24  1511   SOURCE Line, Arterial    LFTs  No recent results    Infectious  No recent results  Glucose  Recent Labs     04/05/24  1511   GLUC 130               Kylah Mirza PA-C
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
## 2024-04-06 NOTE — PLAN OF CARE
Problem: OCCUPATIONAL THERAPY ADULT  Goal: Performs self-care activities at highest level of function for planned discharge setting.  See evaluation for individualized goals.  Description: Treatment Interventions: ADL retraining, Visual perceptual retraining, Functional transfer training, UE strengthening/ROM, Endurance training, Cognitive reorientation, Patient/family training, Equipment evaluation/education, Compensatory technique education, Continued evaluation, Energy conservation, Activityengagement          See flowsheet documentation for full assessment, interventions and recommendations.   Note: Limitation: Decreased ADL status, Decreased Safe judgement during ADL, Decreased cognition, Decreased endurance, Visual deficit, Decreased self-care trans, Decreased high-level ADLs  Prognosis: Fair  Assessment: Pt is a 58 y/o male seen for OT eval s/p adm to \[facility]\"" for elective repair of AAA 4/5; post-op there were concerns about RLE and LUE sensory changes. CTH (-). Pt  has a past medical history of AAA (abdominal aortic aneurysm) ([facility]), BPH (benign prostatic hyperplasia), Chronic kidney disease, Coma ([facility]), Hyperlipidemia, and Hypertension. Per EMR, pt also has hx of TBI. Pt with active OT orders and activity as tolerated orders. Pt lives with his mother and son in 2  w/ a hill to enter, bed/bath 2nd floor. Pt was I w/  ADLS and IADLS, drove, & required no use of DME PTA. Pt is currently demonstrating the following occupational deficits: Min A UB ADLS, Mod-Max A LB ADLS, S bed mobility Min A transfers and functional mobility w/ HHA. These deficits that are impacting pt's baseline areas of occupation are a result of the following impairments: pain, endurance, activity tolerance, functional mobility, forward functional reach, balance, trunk control, functional standing tolerance, decreased I w/ ADLS/IADLS, visual deficits, cognitive impairments, decreased safety awareness, and decreased insight into deficits.The  following Occupational Performance Areas to address include: bathing/shower, toilet hygiene, dressing, medication management, socialization, health maintenance, functional mobility, community mobility, clothing management, cleaning, meal prep, money management, and household maintenance. Recommend home with family support upon D/C vs. Home OT; pending progress. Pt to continue to benefit from acute immediate OT services to address the following goals 2-3x/week to  w/in 10-14 days:     Rehab Resource Intensity Level, OT: III (Minimum Resource Intensity)     Kaela Packer MS, OTR/L
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
## 2024-04-06 NOTE — CONSULTS
"Post-op Check - Vascular Surgery     Assessment:  57 y/o M w/ 7.5cm infrarenal AAA presents for elective repair    S/p Open aortoiliac aneurysm repair, Aorta-R EIA-L LINDA 4/5    Plan:  -Concern for RLE numbness in ICU. Exam w/ M/S intact and DP/PT doppler signals. Pt reports numb sesnation in RLE and L Hand. Low suspicion for acute CVA, suspect related to nerve retraction/irritation or operative positioning. Will monitor. Otherwise, doing well post-op AVSS, pain controlled.  -Anesthesia also at bedside for evaluation due to indwelling epidural and neurology was called for consultation and CTH pending.  -Continue ICU management and monitoring.   -D/W Dr. Lewis    Subjective:  C/O numb sensation of RLE and ulnar side of left hand    Vitals:  BP (!) 205/93 Comment: labetalol given  Pulse 78   Temp 97.5 °F (36.4 °C) (Oral)   Resp 14   Ht 5' 10\" (1.778 m)   Wt 90.7 kg (200 lb)   SpO2 100%   BMI 28.70 kg/m²     I/Os:  I/O last 3 completed shifts:  In: 27008.9 [I.V.:8415.9; Blood:1200; IV Piggyback:1150]  Out: 3240 [Urine:540; Blood:2700]  I/O this shift:  In: -   Out: 150 [Urine:100; Emesis/NG output:50]    Lab Results and Cultures:   Lab Results   Component Value Date    WBC 21.74 (H) 04/05/2024    HGB 12.2 04/05/2024    HCT 35.7 (L) 04/05/2024    MCV 86 04/05/2024     04/05/2024     Lab Results   Component Value Date    GLUCOSE 141 (H) 04/05/2024    CALCIUM 8.0 (L) 04/05/2024    K 5.0 04/05/2024    CO2 18 (L) 04/05/2024     (H) 04/05/2024    BUN 24 04/05/2024    CREATININE 2.15 (H) 04/05/2024     Lab Results   Component Value Date    INR 1.33 (H) 04/05/2024    INR 0.98 03/12/2024    PROTIME 16.3 (H) 04/05/2024    PROTIME 13.4 03/12/2024        Blood Culture: No results found for: \"BLOODCX\",   Urinalysis:   Lab Results   Component Value Date    COLORU Yellow 01/23/2024    COLORU yellow 01/23/2024    CLARITYU Clear 01/23/2024    CLARITYU clear 01/23/2024    SPECGRAV 1.021 01/23/2024    PHUR 6.0 " "01/23/2024    LEUKOCYTESUR Negative 01/23/2024    LEUKOCYTESUR - 01/23/2024    NITRITE Negative 01/23/2024    NITRITE - 01/23/2024    GLUCOSEU Negative 01/23/2024    GLUCOSEU - 01/23/2024    KETONESU Negative 01/23/2024    KETONESU - 01/23/2024    BILIRUBINUR Negative 01/23/2024    BILIRUBINUR - 01/23/2024    BLOODU Negative 01/23/2024    BLOODU moderate 01/23/2024   ,   Urine Culture: No results found for: \"URINECX\",   Wound Culure: No results found for: \"WOUNDCULT\"    Medications:  Current Facility-Administered Medications   Medication Dose Route Frequency    acetaminophen (Ofirmev) injection 1,000 mg  1,000 mg Intravenous Q8H MARIA    ceFAZolin (ANCEF) IVPB (premix in dextrose) 1,000 mg 50 mL  1,000 mg Intravenous Q8H    diphenhydrAMINE (BENADRYL) injection 25 mg  25 mg Intravenous Q6H PRN    Famotidine (PF) (PEPCID) injection 20 mg  20 mg Intravenous Q12H MARIA    heparin (porcine) subcutaneous injection 5,000 Units  5,000 Units Subcutaneous Q8H MARIA    HYDROmorphone (DILAUDID) injection 0.5 mg  0.5 mg Intravenous Q1H PRN    labetalol (NORMODYNE) injection 10 mg  10 mg Intravenous Q6H PRN    lactated ringers bolus 500 mL  500 mL Intravenous Once PRN    And    lactated ringers bolus 500 mL  500 mL Intravenous Once PRN    lactated ringers infusion  125 mL/hr Intravenous Continuous    lidocaine (LIDODERM) 5 % patch 2 patch  2 patch Topical Daily    magnesium sulfate 2 g/50 mL IVPB (premix) 2 g  2 g Intravenous Once    metoclopramide (REGLAN) injection 10 mg  10 mg Intravenous Q6H MARIA    metoprolol (LOPRESSOR) injection 2.5 mg  2.5 mg Intravenous Q6H    metoprolol (LOPRESSOR) injection 5 mg  5 mg Intravenous Q6H PRN    ropivacaine 0.1% and fentaNYL 2 mcg/mL PCEA   Epidural Continuous    sodium chloride 0.9 % bolus 500 mL  500 mL Intravenous Once PRN    And    sodium chloride 0.9 % bolus 500 mL  500 mL Intravenous Once PRN       Physical Exam:    General appearance: alert and oriented, in no acute distress  Neurologic: " Mental status: Alert, oriented, thought content appropriate  Cranial nerves: normal  Sensory: normal  Motor: grossly normal  Lungs: clear to auscultation bilaterally  Heart: regular rate and rhythm, S1, S2 normal, no murmur, click, rub or gallop  Abdomen: soft, mild distension, mildly tender, incision CDI  Extremities: extremities normal, warm and well-perfused; no cyanosis, clubbing, or edema      Pulse exam:  DP: Right: doppler signal Left: doppler signal  PT: Right: doppler signal Left: doppler signal    Joslyn Moreno PA-C  4/5/2024

## 2024-04-06 NOTE — QUICK NOTE
Vascular Surgery    Pt self removed NGT. No nausea, vomiting or burping. Reports flatus, no BM. Abd distended, tympanic, NT. Incision C/DI.    Plan:  Will observe without replacing NGT for now  Keep strict NPO tonight and re-evaluate tomorrow  D/W bedside nurse     Joslyn Moreno  4/6/2024

## 2024-04-07 LAB
ABO GROUP BLD BPU: NORMAL
ANION GAP SERPL CALCULATED.3IONS-SCNC: 10 MMOL/L (ref 4–13)
BPU ID: NORMAL
BUN SERPL-MCNC: 32 MG/DL (ref 5–25)
CALCIUM SERPL-MCNC: 7.8 MG/DL (ref 8.4–10.2)
CHLORIDE SERPL-SCNC: 108 MMOL/L (ref 96–108)
CO2 SERPL-SCNC: 24 MMOL/L (ref 21–32)
CREAT SERPL-MCNC: 2.61 MG/DL (ref 0.6–1.3)
ERYTHROCYTE [DISTWIDTH] IN BLOOD BY AUTOMATED COUNT: 13.7 % (ref 11.6–15.1)
GFR SERPL CREATININE-BSD FRML MDRD: 25 ML/MIN/1.73SQ M
GLUCOSE SERPL-MCNC: 103 MG/DL (ref 65–140)
GLUCOSE SERPL-MCNC: 104 MG/DL (ref 65–140)
GLUCOSE SERPL-MCNC: 107 MG/DL (ref 65–140)
GLUCOSE SERPL-MCNC: 109 MG/DL (ref 65–140)
GLUCOSE SERPL-MCNC: 81 MG/DL (ref 65–140)
HCT VFR BLD AUTO: 32 % (ref 36.5–49.3)
HGB BLD-MCNC: 10.8 G/DL (ref 12–17)
MAGNESIUM SERPL-MCNC: 2 MG/DL (ref 1.9–2.7)
MCH RBC QN AUTO: 29.2 PG (ref 26.8–34.3)
MCHC RBC AUTO-ENTMCNC: 33.8 G/DL (ref 31.4–37.4)
MCV RBC AUTO: 87 FL (ref 82–98)
PLATELET # BLD AUTO: 144 THOUSANDS/UL (ref 149–390)
PMV BLD AUTO: 10.1 FL (ref 8.9–12.7)
POTASSIUM SERPL-SCNC: 3.9 MMOL/L (ref 3.5–5.3)
RBC # BLD AUTO: 3.7 MILLION/UL (ref 3.88–5.62)
SODIUM SERPL-SCNC: 142 MMOL/L (ref 135–147)
UNIT DISPENSE STATUS: NORMAL
UNIT PRODUCT CODE: NORMAL
UNIT PRODUCT VOLUME: 250 ML
UNIT PRODUCT VOLUME: 280 ML
UNIT RH: NORMAL
WBC # BLD AUTO: 19.09 THOUSAND/UL (ref 4.31–10.16)

## 2024-04-07 PROCEDURE — 82948 REAGENT STRIP/BLOOD GLUCOSE: CPT

## 2024-04-07 PROCEDURE — 99024 POSTOP FOLLOW-UP VISIT: CPT | Performed by: SURGERY

## 2024-04-07 PROCEDURE — 85027 COMPLETE CBC AUTOMATED: CPT

## 2024-04-07 PROCEDURE — 83735 ASSAY OF MAGNESIUM: CPT

## 2024-04-07 PROCEDURE — 99232 SBSQ HOSP IP/OBS MODERATE 35: CPT | Performed by: STUDENT IN AN ORGANIZED HEALTH CARE EDUCATION/TRAINING PROGRAM

## 2024-04-07 PROCEDURE — 80048 BASIC METABOLIC PNL TOTAL CA: CPT

## 2024-04-07 PROCEDURE — C9113 INJ PANTOPRAZOLE SODIUM, VIA: HCPCS | Performed by: PHYSICIAN ASSISTANT

## 2024-04-07 RX ORDER — ACETAMINOPHEN 325 MG/1
975 TABLET ORAL EVERY 8 HOURS SCHEDULED
Status: DISCONTINUED | OUTPATIENT
Start: 2024-04-07 | End: 2024-04-08

## 2024-04-07 RX ORDER — LABETALOL HYDROCHLORIDE 5 MG/ML
10 INJECTION, SOLUTION INTRAVENOUS EVERY 4 HOURS PRN
Status: DISCONTINUED | OUTPATIENT
Start: 2024-04-07 | End: 2024-04-11 | Stop reason: HOSPADM

## 2024-04-07 RX ORDER — HYDRALAZINE HYDROCHLORIDE 20 MG/ML
10 INJECTION INTRAMUSCULAR; INTRAVENOUS EVERY 4 HOURS PRN
Status: DISCONTINUED | OUTPATIENT
Start: 2024-04-07 | End: 2024-04-11 | Stop reason: HOSPADM

## 2024-04-07 RX ORDER — HYDROMORPHONE HCL IN WATER/PF 6 MG/30 ML
0.2 PATIENT CONTROLLED ANALGESIA SYRINGE INTRAVENOUS EVERY 2 HOUR PRN
Status: DISCONTINUED | OUTPATIENT
Start: 2024-04-07 | End: 2024-04-08

## 2024-04-07 RX ORDER — POTASSIUM CHLORIDE 14.9 MG/ML
20 INJECTION INTRAVENOUS ONCE
Status: COMPLETED | OUTPATIENT
Start: 2024-04-07 | End: 2024-04-07

## 2024-04-07 RX ORDER — METOPROLOL SUCCINATE 25 MG/1
25 TABLET, EXTENDED RELEASE ORAL DAILY
Status: DISCONTINUED | OUTPATIENT
Start: 2024-04-07 | End: 2024-04-08

## 2024-04-07 RX ORDER — ATORVASTATIN CALCIUM 40 MG/1
40 TABLET, FILM COATED ORAL
Status: DISCONTINUED | OUTPATIENT
Start: 2024-04-07 | End: 2024-04-11 | Stop reason: HOSPADM

## 2024-04-07 RX ADMIN — POTASSIUM CHLORIDE 20 MEQ: 14.9 INJECTION, SOLUTION INTRAVENOUS at 07:35

## 2024-04-07 RX ADMIN — METOROPROLOL TARTRATE 2.5 MG: 5 INJECTION, SOLUTION INTRAVENOUS at 04:45

## 2024-04-07 RX ADMIN — ACETAMINOPHEN 975 MG: 325 TABLET, FILM COATED ORAL at 13:50

## 2024-04-07 RX ADMIN — LABETALOL HYDROCHLORIDE 10 MG: 5 INJECTION, SOLUTION INTRAVENOUS at 20:35

## 2024-04-07 RX ADMIN — HEPARIN SODIUM 5000 UNITS: 5000 INJECTION INTRAVENOUS; SUBCUTANEOUS at 13:51

## 2024-04-07 RX ADMIN — PANTOPRAZOLE SODIUM 40 MG: 40 INJECTION, POWDER, FOR SOLUTION INTRAVENOUS at 08:57

## 2024-04-07 RX ADMIN — ACETAMINOPHEN 975 MG: 325 TABLET, FILM COATED ORAL at 20:28

## 2024-04-07 RX ADMIN — HYDRALAZINE HYDROCHLORIDE 10 MG: 20 INJECTION INTRAMUSCULAR; INTRAVENOUS at 01:34

## 2024-04-07 RX ADMIN — HYDRALAZINE HYDROCHLORIDE 10 MG: 20 INJECTION INTRAMUSCULAR; INTRAVENOUS at 21:39

## 2024-04-07 RX ADMIN — ATORVASTATIN CALCIUM 40 MG: 40 TABLET, FILM COATED ORAL at 16:18

## 2024-04-07 RX ADMIN — HEPARIN SODIUM 5000 UNITS: 5000 INJECTION INTRAVENOUS; SUBCUTANEOUS at 05:00

## 2024-04-07 RX ADMIN — METOPROLOL SUCCINATE 25 MG: 25 TABLET, EXTENDED RELEASE ORAL at 08:53

## 2024-04-07 RX ADMIN — METOCLOPRAMIDE HYDROCHLORIDE 10 MG: 5 INJECTION INTRAMUSCULAR; INTRAVENOUS at 12:17

## 2024-04-07 RX ADMIN — FAMOTIDINE 20 MG: 10 INJECTION INTRAVENOUS at 20:28

## 2024-04-07 RX ADMIN — FENTANYL CITRATE: 0.05 INJECTION, SOLUTION INTRAMUSCULAR; INTRAVENOUS at 21:35

## 2024-04-07 RX ADMIN — HEPARIN SODIUM 5000 UNITS: 5000 INJECTION INTRAVENOUS; SUBCUTANEOUS at 20:28

## 2024-04-07 RX ADMIN — FAMOTIDINE 20 MG: 10 INJECTION INTRAVENOUS at 09:06

## 2024-04-07 RX ADMIN — METOCLOPRAMIDE HYDROCHLORIDE 10 MG: 5 INJECTION INTRAMUSCULAR; INTRAVENOUS at 18:16

## 2024-04-07 RX ADMIN — LIDOCAINE 5% 2 PATCH: 700 PATCH TOPICAL at 08:54

## 2024-04-07 RX ADMIN — ACETAMINOPHEN 1000 MG: 10 INJECTION INTRAVENOUS at 05:00

## 2024-04-07 RX ADMIN — METOCLOPRAMIDE HYDROCHLORIDE 10 MG: 5 INJECTION INTRAMUSCULAR; INTRAVENOUS at 05:00

## 2024-04-07 RX ADMIN — METOCLOPRAMIDE HYDROCHLORIDE 10 MG: 5 INJECTION INTRAMUSCULAR; INTRAVENOUS at 00:37

## 2024-04-07 RX ADMIN — ASPIRIN 81 MG: 81 TABLET, COATED ORAL at 08:53

## 2024-04-07 NOTE — PROGRESS NOTES
[facility]  Progress Note: Critical Care  Name: Sourav Saul 59 y.o. male I MRN: [id number]  Unit/Bed#: PPHP 516-01 I Date of Admission: 4/5/2024   Date of Service: 4/7/2024 I Hospital Day: 2    Assessment/Plan   Neuro:   Diagnosis: Acute post-op pain  Plan: Ropivacaine/Fentanyl PCEA in place, appreciate acute pain's assistance  Tylenol 1 g IV q8h  Dilaudid 0.5 mg IV q2h PRN   Lidoderm patches   Diagnosis: Paresthesias   Plan: Neurology consulted, appreciate their recommendations  4/5 CTH: no acute findings  Continue close neurological monitoring  Hx of TBI likely contributing to inconsistent history      CV:   Diagnosis: Abdominal aortic aneurysm, iliac artery aneurysm  Plan: 4/5 Open AAA repair with 18/x9 mm dacron bifurcated graft to R external iliac artery and L common iliac artery  Vascular surgery primary   Restart aspirin and atorvastatin when able to tolerate PO   Q1h neurovascular checks   Diagnosis: Hypertension  Plan: Home medications - Toprol-XL 25 mg daily, lisinopril 20 mg daily  Metoprolol 2.5 mg IV q6h - increase to 5mg  Hydralazine and labetalol PRN for goal SBP < 180 mmHg  Diagnosis: Hyperlipidemia   Plan: Home atorvastatin and vascepa currently on hold     Pulm:  Diagnosis: Acute post-operative respiratory insufficiency   Plan: Currently on 2L NC  IS, OOB     GI:   Diagnosis: At risk for post-op ileus   Plan: NPO, patient removed NGT  Reglan 10 mg IV q6h   Pepcid 20 mg IV q12h  Protonix 40 mg IV daily     :   Diagnosis: RAQUEL on CKD  Plan: Baseline Cr 2.0  At increased risk for post-operative RAQUEL given aberrant renal vessels off the aneurysm as well as supra-renal clamp time  Trend renal function  Strict I/Os  Nephrology consulted, appreciate their recommendations   Diagnosis: BPH   Plan: Home flomax on hold  Maintain alfred until able to tolerate PO medications     F/E/N:   Off IVFs  Replete electrolytes as needed  NPO - diet per primary team       Heme/Onc:   Diagnosis: Acute blood loss anemia  Plan: Intra-op blood products - 2 PRBC, 2 FFP  DVT PPx: SQH      Endo:   Diagnosis: Pre-diabetes  Plan: HbA1c 5.8%  Goal blood glucose 140-180  Q6h accuchecks     ID:   Trend WBC/fever curve     MSK/Skin:   PT/OT     Disposition: Stepdown Level 1    ICU Core Measures     A: Assess, Prevent, and Manage Pain Has pain been assessed? Yes  Need for changes to pain regimen? No   B: Both SAT/SAT  N/A   C: Choice of Sedation RASS Goal: N/A patient not on sedation  Need for changes to sedation or analgesia regimen? No   D: Delirium CAM-ICU: Negative   E: Early Mobility  Plan for early mobility? Yes   F: Family Engagement Plan for family engagement today? Yes       Review of Invasive Devices:    Recio Plan: Voiding trial after improvement in ambulation   Central access plan: Will obtain peripheral access and discontinue prior to transfer      Prophylaxis:  VTE VTE covered by:  heparin (porcine), Subcutaneous, 5,000 Units at 04/06/24 2123       Stress Ulcer  covered byFamotidine (PF) (PEPCID) injection 20 mg [[id number]], pantoprazole (PROTONIX) injection 40 mg [[id number]]        Significant 24hr Events     24hr events: Intermittently hypertensive requiring PRN medications. Patient removed NGT. No major overnight events.      Subjective     Review of Systems   Respiratory:  Negative for shortness of breath.    Cardiovascular:  Negative for chest pain.   Gastrointestinal:  Positive for abdominal pain. Negative for nausea.        Objective                            Vitals I/O      Most Recent Min/Max in 24hrs   Temp 99.1 °F (37.3 °C) Temp  Min: 98.1 °F (36.7 °C)  Max: 99.3 °F (37.4 °C)   Pulse 88 Pulse  Min: 86  Max: 100   Resp 22 Resp  Min: 15  Max: 31   BP (!) 194/95 BP  Min: 105/57  Max: 195/93   O2 Sat 90 % SpO2  Min: 90 %  Max: 99 %      Intake/Output Summary (Last 24 hours) at 4/7/2024 0139  Last data filed at 4/7/2024 0001  Gross per 24 hour   Intake 2358.05 ml   Output  3030 ml   Net -671.95 ml       Diet NPO    Invasive Monitoring           Physical Exam   Physical Exam  Skin:     General: Skin is warm and dry.      Capillary Refill: Capillary refill takes 2 to 3 seconds.   HENT:      Mouth/Throat:      Mouth: Mucous membranes are moist.   Cardiovascular:      Rate and Rhythm: Normal rate and regular rhythm.      Pulses:           Radial pulses are 2+ on the right side and 2+ on the left side.        Dorsalis pedis pulses are 2+ on the right side and 2+ on the left side.   Musculoskeletal:      Right lower leg: No edema.      Left lower leg: No edema.   Abdominal: General: Bowel sounds are decreased. There is distension.     Palpations: Abdomen is soft.      Tenderness: There is abdominal tenderness.      Comments: Midline abdominal dressing C/D/I   Constitutional:       General: He is not in acute distress.  Pulmonary:      Effort: Pulmonary effort is normal.      Breath sounds: No wheezing, rhonchi or rales.   Neurological:      General: No focal deficit present.      Mental Status: He is alert and oriented to person, place and time.   Genitourinary/Anorectal:  Recio present.          Diagnostic Studies           Medications:  Scheduled PRN   acetaminophen, 1,000 mg, Q8H MARIA  famotidine, 20 mg, Q12H MRAIA  heparin (porcine), 5,000 Units, Q8H MARIA  lidocaine, 2 patch, Daily  metoclopramide, 10 mg, Q6H MARIA  metoprolol, 2.5 mg, Q6H  pantoprazole, 40 mg, Daily      diphenhydrAMINE, 25 mg, Q6H PRN  hydrALAZINE, 10 mg, Q6H PRN  HYDROmorphone, 0.5 mg, Q2H PRN  labetalol, 10 mg, Q6H PRN  metoprolol, 5 mg, Q6H PRN       Continuous    ropivacaine 0.1% and fentaNYL 2 mcg/mL,          Labs:    CBC    Recent Labs     04/05/24  1511 04/06/24  0441   WBC 21.74* 18.03*   HGB 12.2 11.1*   HCT 35.7* 33.1*    167     BMP    Recent Labs     04/05/24  1511 04/06/24  0441   SODIUM 142 143   K 5.0 4.4   * 110*   CO2 18* 22   AGAP 11 11   BUN 24 34*   CREATININE 2.15* 2.62*   CALCIUM 8.0*  7.6*       Coags    Recent Labs     04/05/24  1511   INR 1.33*        Additional Electrolytes  Recent Labs     04/05/24  1430 04/05/24  1511 04/05/24  1803 04/06/24  0441   MG  --  1.3*  --  1.8*   PHOS  --  4.1  --  3.7   CAIONIZED 1.24  --  1.15  --           Blood Gas    Recent Labs     04/05/24  1511   PHART 7.320*   LTE3HIX 37.8   PO2ART 128.8   YAW3JIY 19.0*   BEART -6.4   SOURCE Line, Arterial     Recent Labs     04/05/24  1511   SOURCE Line, Arterial    LFTs  No recent results    Infectious  No recent results  Glucose  Recent Labs     04/05/24  1511 04/06/24  0441   GLUC 130 170*               Kylah Mirza PA-C

## 2024-04-07 NOTE — CASE MANAGEMENT
Case Management Assessment & Discharge Planning Note    Patient name Sourav Saul  Location [facility] 516/[facility] 516-01 MRN [id number]  : 1965 Date 2024       Current Admission Date: 2024  Current Admission Diagnosis:Abdominal aortic aneurysm (AAA) (HCC)   Patient Active Problem List    Diagnosis Date Noted    Decreased sensation 2024    Hypertension 2024    BPH (benign prostatic hyperplasia) 2024    Hyperlipidemia 2024    Iliac artery aneurysm, bilateral (HCC) 2024    CKD (chronic kidney disease) 2024    Prominent popliteal pulse 2024    Left carotid bruit 2024    Abdominal aortic aneurysm (AAA) (HCC) 02/15/2024      LOS (days): 2  Geometric Mean LOS (GMLOS) (days): 1.5  Days to GMLOS:-0.4     OBJECTIVE:    Risk of Unplanned Readmission Score: 16.22         Current admission status: Inpatient       Preferred Pharmacy:   [id number] - [place], [place] - [address]  [address]  [place] [address]  Phone: [phone] Fax: [phone]    [facility]  [place], [place] - [address]  [address]  [place] [address]  Phone: [phone] Fax: [phone]    Primary Care Provider: Craig Rogers DO    Primary Insurance: [employer] REP  Secondary Insurance:     ASSESSMENT:  Active Health Care Proxies    There are no active Health Care Proxies on file.       Advance Directives  Does patient have a Health Care POA?: No  Does patient have Advance Directives?: No              Patient Information  Admitted from:: Home  Mental Status: Alert  Assessment information provided by:: Parent  Support Systems: Parent, Family members  Home entry access options. Select all that apply.: Stairs  Number of steps to enter home.: One Flight  Do the steps have railings?: Yes  Type of Current Residence: 2 story home  Upon entering residence, is there a bedroom on the main floor (no further steps)?: No  A  bedroom is located on the following floor levels of residence (select all that apply):: 2nd Floor  Upon entering residence, is there a bathroom on the main floor (no further steps)?: Yes  Number of steps to 2nd floor from main floor: One Flight  Living Arrangements: Lives w/ Parent(s)    Activities of Daily Living Prior to Admission  Functional Status: Independent  Completes ADLs independently?: Yes  Ambulates independently?: Yes  Does patient use assisted devices?: No  Does patient currently own DME?: No  Does patient have a history of Outpatient Therapy (PT/OT)?: No  Does the patient have a history of Short-Term Rehab?: Yes ([facility])  Does patient have a history of HHC?: No         Patient Information Continued  Income Source: Unemployed  Does patient have prescription coverage?: Yes  Does patient receive dialysis treatments?: No  Does patient have a history of substance abuse?: Yes  Historical substance use preference: Alcohol/ETOH  History of Withdrawal Symptoms: Denies past symptoms  Is patient currently in treatment for substance abuse?: N/A - sober  Does patient have a history of Mental Health Diagnosis?: Yes  Is patient receiving treatment for mental health?: Yes  Has patient received inpatient treatment related to mental health in the last 2 years?: No         Means of Transportation  Means of Transport to Appts:: Drives Self      Social Determinants of Health (SDOH)      Flowsheet Row Most Recent Value   Housing Stability    In the last 12 months, was there a time when you were not able to pay the mortgage or rent on time? N   In the last 12 months, how many places have you lived? 1   In the last 12 months, was there a time when you did not have a steady place to sleep or slept in a shelter (including now)? N   Transportation Needs    In the past 12 months, has lack of transportation kept you from medical appointments or from getting medications? no   In the past 12 months, has lack of  transportation kept you from meetings, work, or from getting things needed for daily living? No   Food Insecurity    Within the past 12 months, you worried that your food would run out before you got the money to buy more. Never true   Within the past 12 months, the food you bought just didn't last and you didn't have money to get more. Never true   Utilities    In the past 12 months has the electric, gas, oil, or water company threatened to shut off services in your home? No            DISCHARGE DETAILS:    Discharge planning discussed with:: mother Leatha  Freedom of Choice: Yes                   Contacts  Patient Contacts: mother Leatha  Relationship to Patient:: Family  Contact Method: Phone  Phone Number: [phone]  Reason/Outcome: Continuity of Care, Emergency Contact, Discharge Planning                                                                     Additional Comments: resides with mother, hx ETOH abuse, sober 20 yrs

## 2024-04-07 NOTE — PROGRESS NOTES
Progress Note - Acute Pain Service    Sourav Saul 59 y.o. male MRN: [id number]  Unit/Bed#: [facility] 516-01 Encounter: [id number]      Sourav Saul is a 59 y.o. male with PMHx of CKD III, HTN, BPH who presented with elective repair of AAA repair. A perioperative thoracic epidural was placed for post-operative analgesia. APS consulted for post-operative pain/epidural management.     Upon bedside evaluation, Ed was comfortable in bed. Pain well controlled with epidural PCEA. No evidence of excessive sympathectomy-induced hypotension/pressor requirements/abnormal sensorimotor deficits. NGT inadvertently removed yesterday but patient reports having BM. No opioid use in past 24 hours. Able to get OOB into chair.     * Abdominal aortic aneurysm (AAA) ([facility])  Assessment & Plan  S/p open AAA repair with 18/x9 mm dacron bifurcated graft to R external iliac artery and L common iliac artery on 4/5  Thoracic epidural placed on 4/5    Pain continues to be well controlled with epidural PCEA. NGT removed. If the patient's diet is advanced and tolerating PO with no concerns of ileus, epidural can be removed tomorrow. Please hold SQ heparin in preparation for epidural removal tomorrow (4/8).     Plan:  Continue thoracic epidural 0.1% ropivacaine/2mcg/ml fentanyl @6/4/10/3  Wean IV dilaudid to 0.2mg q2hr PRN for breakthrough pain  Continue other MMA  PO Tylenol 975mg q8hr norma  NSAIDs contraindicated in setting of CKD  Encourage OOB, PT/OT  Bowel regimen per surgical team        APS will continue to follow. Please contact Acute Pain Service - via Omnistream from [phone] with additional questions or concerns. See Omnistream or Neomatrixon for additional contacts and after hours information.     Pain History  Current pain location(s):  Pain Score: 0  Pain Location/Orientation: Location: Abdomen  Pain Scale: Pain Assessment Tool: 0-10  24 hour history: See above    Opioid requirement previous 24 hours: N/A    Meds/Allergies   all current  active meds have been reviewed    No Known Allergies    Objective        Vitals:    04/07/24 0800 04/07/24 0853 04/07/24 0900 04/07/24 1000   BP: 165/78 (!) 193/91 (!) 193/91 (!) 174/86   BP Location: Left arm  Left arm Left arm   Pulse: 83 84 84 84   Resp: (!) 24  21 (!) 24   Temp:   98.9 °F (37.2 °C)    TempSrc:   Oral    SpO2: 93%  93% 94%   Weight:       Height:             Physical Exam  Vitals reviewed.   HENT:      Head: Normocephalic.      Mouth/Throat:      Mouth: Mucous membranes are dry.   Eyes:      Extraocular Movements: Extraocular movements intact.   Cardiovascular:      Rate and Rhythm: Normal rate.      Pulses: Normal pulses.   Pulmonary:      Effort: Pulmonary effort is normal.   Abdominal:      General: Abdomen is flat.   Musculoskeletal:         General: Normal range of motion.      Cervical back: Normal range of motion.   Skin:     General: Skin is dry.   Neurological:      General: No focal deficit present.      Mental Status: He is alert and oriented to person, place, and time.   Psychiatric:         Mood and Affect: Mood normal.       Epidural: Site clean/dry/intact, no surrounding erythema/edema/induration, infusion functioning appropriately     Lab Results:   Estimated Creatinine Clearance: 37.3 mL/min (A) (by C-G formula based on SCr of 2.61 mg/dL (H)).  Lab Results   Component Value Date    WBC 19.09 (H) 04/07/2024    HGB 10.8 (L) 04/07/2024    HCT 32.0 (L) 04/07/2024     (L) 04/07/2024         Component Value Date/Time    K 3.9 04/07/2024 0513     04/07/2024 0513    CO2 24 04/07/2024 0513    CO2 21 04/05/2024 1430    BUN 32 (H) 04/07/2024 0513    CREATININE 2.61 (H) 04/07/2024 0513         Component Value Date/Time    CALCIUM 7.8 (L) 04/07/2024 0513       Imaging Studies/EKG: I have personally reviewed pertinent reports.       Counseling / Coordination of Care  Total floor / unit time spent today 20 minutes minutes. Greater than 50% of total time was spent with the patient  and / or family counseling and / or coordination of care.     Please note that the APS provides consultative services regarding pain management only.  With the exception of ketamine and epidural infusions and except when indicated, final decisions regarding starting or changing doses of analgesic medications are at the discretion of the consulting service.    Armond Diaz MD   Acute Pain Service

## 2024-04-07 NOTE — PROGRESS NOTES
Progress Note - Vascular Surgery   Sourav Saul 59 y.o. male MRN: [id number]  Unit/Bed#: [facility] 516-01 Encounter: [id number]    Assessment:  Sourav Saul is a 59 y.o. male hx of TBI noted to have 7.5cm AAA and B LINDA aneurysms.      4/5 - Open aortoiliac aneurysm repair with 18x9mm dacron bifurcated graft (gwxcz-uc-pcana repair w/ R-EIA, L-LINDA), suprarenal clamp time 23min     Plan:  Neuro - post-operative pain, baseline paresthesias from TBI   Noted to have RLE numbness and Left hand numbness/paresthesias resolved  CT head negative   Neurology input appreciated  No evidence of SCI   Pain control w/ Ropivacaine/Fentanyl PCEA   Prn breakthrough   APS input appreciated   Consider epidural removal POD#3 if able   Continue frequent neurovascular checks   CV - AAA, b/l LINDA s/p OAR   Goal MAP > 65   S/p 8L crystalloid, 1L cell-saver, 1L albumin intra-op   Continue A-line monitoring   Neurovascular checks   HTN - maintain SBP<180   IV lopressor w/ hydralazine/labetalol prn   Transition to PO Troprol 25mg    Aspirin/statin  Monitor for fluid overload   Pulm   Monitor pulse ox  Supplemental O2 as needed, currently on RA   Encourage IS   GI - post-op ileus, monitor for colonic ischemia   Currently no evidence of colonic ischemia  Return of bowel function noted, non-bloody   NGT removed overnight, consider replacement if develops n/v   Diet - okay to have ice chips/sips   GI ppx    - baseline CKD3b   Supra-renal clamp 23 min, accessory renals off aneurysm sac without reimplantation    Trend Cr., currently elevated 2.61 (2.62)  Strict I/O   Maintain alfred  UO 2825cc/24hrs   Heme - ABLA   Total transfusion 2u pRBC, 2u FFP   Trend H/H  Transfuse as neede for hgb < 7   DVT ppx   ID   Abx per SCIPS   Trend WBC, slight increase to 19 from 18 this AM, no evidence of acute infection   No evidence of ischemic colitis, remains without hypotension, abdominal pain, or bloody stools   Monitor fever curve   Endo   Glucose control  "  MSK  Encourage OOB/ambulation   PT/OT   F/E/N  Lytes per ICU team   Diet - ice chips/sips    Dispo - continue ICU level of care, critical care assistance appreciated, potential downgrade to SD1 if remains stable over next 24 hrs     Subjective/Objective    S/E this AM, Patient removed NGT overnight, remained without n/v, not replaced, had return of bowel function, + flatus/BM x 3 (non-bloody).  Paresthesias remain resolved.  Incisional pain is well controlled.  Has been OOB to chair.  No other complaints, denies f/c, HA, CP, SOB, n/v, new paresthesias, or motor/sensory dysfunction.      Objective:     Blood pressure 165/78, pulse 83, temperature 99.5 °F (37.5 °C), resp. rate (!) 24, height 5' 10\" (1.778 m), weight 107 kg (235 lb 7.2 oz), SpO2 92%.,Body mass index is 33.78 kg/m².      Intake/Output Summary (Last 24 hours) at 4/7/2024 0720  Last data filed at 4/7/2024 0601  Gross per 24 hour   Intake 1132.92 ml   Output 3005 ml   Net -1872.08 ml       Physical Exam:   GEN: NAD  HEENT: NCAT, R-IJ TLCVC   CV: RRR  Lung: Normal effort  Ab: Soft, NT/ND, midline incision C/D/I, alfred catheter in place   Extrem: 2+ fem b/l, right DP 1+, left DP 2+, motor 5/5 throughout, sensation intact, compartments soft  Neuro: A+Ox3     Lab, Imaging and other studies:I have personally reviewed pertinent lab results.     VTE Pharmacologic Prophylaxis: Heparin  VTE Mechanical Prophylaxis: sequential compression device    Recent Results (from the past 36 hour(s))   Lactic acid 2 Hours    Collection Time: 04/05/24  8:34 PM   Result Value Ref Range    LACTIC ACID 3.2 (HH) 0.5 - 2.0 mmol/L   Lactic acid, plasma (w/reflex if result > 2.0)    Collection Time: 04/05/24 11:16 PM   Result Value Ref Range    LACTIC ACID 3.1 (HH) 0.5 - 2.0 mmol/L   Fingerstick Glucose (POCT)    Collection Time: 04/06/24 12:02 AM   Result Value Ref Range    POC Glucose 134 65 - 140 mg/dl   Lactic acid 2 Hours    Collection Time: 04/06/24  1:55 AM   Result Value Ref " Range    LACTIC ACID 2.6 (HH) 0.5 - 2.0 mmol/L   Lactic acid, plasma (w/reflex if result > 2.0)    Collection Time: 04/06/24  4:31 AM   Result Value Ref Range    LACTIC ACID 2.1 (HH) 0.5 - 2.0 mmol/L   Basic Metabolic Panel    Collection Time: 04/06/24  4:41 AM   Result Value Ref Range    Sodium 143 135 - 147 mmol/L    Potassium 4.4 3.5 - 5.3 mmol/L    Chloride 110 (H) 96 - 108 mmol/L    CO2 22 21 - 32 mmol/L    ANION GAP 11 4 - 13 mmol/L    BUN 34 (H) 5 - 25 mg/dL    Creatinine 2.62 (H) 0.60 - 1.30 mg/dL    Glucose 170 (H) 65 - 140 mg/dL    Calcium 7.6 (L) 8.4 - 10.2 mg/dL    eGFR 25 ml/min/1.73sq m   CBC and Platelet    Collection Time: 04/06/24  4:41 AM   Result Value Ref Range    WBC 18.03 (H) 4.31 - 10.16 Thousand/uL    RBC 3.82 (L) 3.88 - 5.62 Million/uL    Hemoglobin 11.1 (L) 12.0 - 17.0 g/dL    Hematocrit 33.1 (L) 36.5 - 49.3 %    MCV 87 82 - 98 fL    MCH 29.1 26.8 - 34.3 pg    MCHC 33.5 31.4 - 37.4 g/dL    RDW 13.7 11.6 - 15.1 %    Platelets 167 149 - 390 Thousands/uL    MPV 10.1 8.9 - 12.7 fL   Magnesium    Collection Time: 04/06/24  4:41 AM   Result Value Ref Range    Magnesium 1.8 (L) 1.9 - 2.7 mg/dL   Phosphorus    Collection Time: 04/06/24  4:41 AM   Result Value Ref Range    Phosphorus 3.7 2.7 - 4.5 mg/dL   Fingerstick Glucose (POCT)    Collection Time: 04/06/24  5:13 AM   Result Value Ref Range    POC Glucose 138 65 - 140 mg/dl   Prepare Leukoreduced RBC: 4 Units    Collection Time: 04/06/24  7:26 AM   Result Value Ref Range    Unit Product Code S0128T24     Unit Number [id number]     Unit ABO A     Unit RH POS     Crossmatch Compatible     Unit Dispense Status Return to Inv     Unit Product Volume 350 mL    Unit Product Code I8175G40     Unit Number [id number]     Unit ABO A     Unit RH POS     Crossmatch Compatible     Unit Dispense Status Presumed Trans     Unit Product Volume 300 ml    Unit Product Code B1465O79     Unit Number [id number]     Unit ABO A     Unit RH POS      Crossmatch Compatible     Unit Dispense Status Presumed Trans     Unit Product Volume 300 ml    Unit Product Code J3224S63     Unit Number [id number]     Unit ABO A     Unit RH POS     Crossmatch Compatible     Unit Dispense Status Return to Inv     Unit Product Volume 300 ml   Lactic acid 2 Hours    Collection Time: 04/06/24  7:54 AM   Result Value Ref Range    LACTIC ACID 1.7 0.5 - 2.0 mmol/L   Fingerstick Glucose (POCT)    Collection Time: 04/06/24 11:54 AM   Result Value Ref Range    POC Glucose 124 65 - 140 mg/dl   Urinalysis with microscopic    Collection Time: 04/06/24  2:48 PM   Result Value Ref Range    Color, UA Light Yellow     Clarity, UA Clear     Specific Gravity, UA 1.018 1.003 - 1.030    pH, UA 5.5 4.5, 5.0, 5.5, 6.0, 6.5, 7.0, 7.5, 8.0    Leukocytes, UA Negative Negative    Nitrite, UA Negative Negative    Protein, UA Trace (A) Negative mg/dl    Glucose, UA Negative Negative mg/dl    Ketones, UA Negative Negative mg/dl    Urobilinogen, UA <2.0 <2.0 mg/dl mg/dl    Bilirubin, UA Negative Negative    Occult Blood, UA Small (A) Negative    RBC, UA 4-10 (A) None Seen, 1-2 /hpf    WBC, UA None Seen None Seen, 1-2 /hpf    Epithelial Cells None Seen None Seen, Occasional /hpf    Bacteria, UA None Seen None Seen, Occasional /hpf    MUCUS THREADS Occasional (A) None Seen   Chloride, urine, random    Collection Time: 04/06/24  2:48 PM   Result Value Ref Range    Chloride, Ur 100 Reference range not established. mmol/L   Sodium, urine, random    Collection Time: 04/06/24  2:48 PM   Result Value Ref Range    Sodium, Ur 108 Reference range not established.   Urea nitrogen, urine    Collection Time: 04/06/24  2:48 PM   Result Value Ref Range    Urea Nitrogen, Ur 748 Reference range not established. mg/dL   Creatinine, urine, random    Collection Time: 04/06/24  2:48 PM   Result Value Ref Range    Creatinine, Ur 107.4 Reference range not established. mg/dL   Fingerstick Glucose (POCT)    Collection Time:  04/06/24  5:33 PM   Result Value Ref Range    POC Glucose 105 65 - 140 mg/dl   Prepare Plasma: 4 Units    Collection Time: 04/07/24 12:05 AM   Result Value Ref Range    Unit Product Code V3662E34     Unit Number [id number]     Unit ABO A     Unit RH POS     Unit Dispense Status Presumed Trans     Unit Product Volume 280 ml    Unit Product Code O1145U48     Unit Number [id number]     Unit ABO A     Unit RH POS     Unit Dispense Status Return to Inv     Unit Product Volume 280 ml    Unit Product Code U2807T72     Unit Number [id number]     Unit ABO A     Unit RH NEG     Unit Dispense Status Return to Inv     Unit Product Volume 250 ml    Unit Product Code O6625G55     Unit Number [id number]     Unit ABO A     Unit RH NEG     Unit Dispense Status Presumed Trans     Unit Product Volume 280 ml   Fingerstick Glucose (POCT)    Collection Time: 04/07/24 12:40 AM   Result Value Ref Range    POC Glucose 109 65 - 140 mg/dl   Basic metabolic panel    Collection Time: 04/07/24  5:13 AM   Result Value Ref Range    Sodium 142 135 - 147 mmol/L    Potassium 3.9 3.5 - 5.3 mmol/L    Chloride 108 96 - 108 mmol/L    CO2 24 21 - 32 mmol/L    ANION GAP 10 4 - 13 mmol/L    BUN 32 (H) 5 - 25 mg/dL    Creatinine 2.61 (H) 0.60 - 1.30 mg/dL    Glucose 107 65 - 140 mg/dL    Calcium 7.8 (L) 8.4 - 10.2 mg/dL    eGFR 25 ml/min/1.73sq m   CBC    Collection Time: 04/07/24  5:13 AM   Result Value Ref Range    WBC 19.09 (H) 4.31 - 10.16 Thousand/uL    RBC 3.70 (L) 3.88 - 5.62 Million/uL    Hemoglobin 10.8 (L) 12.0 - 17.0 g/dL    Hematocrit 32.0 (L) 36.5 - 49.3 %    MCV 87 82 - 98 fL    MCH 29.2 26.8 - 34.3 pg    MCHC 33.8 31.4 - 37.4 g/dL    RDW 13.7 11.6 - 15.1 %    Platelets 144 (L) 149 - 390 Thousands/uL    MPV 10.1 8.9 - 12.7 fL   Magnesium    Collection Time: 04/07/24  5:13 AM   Result Value Ref Range    Magnesium 2.0 1.9 - 2.7 mg/dL   Fingerstick Glucose (POCT)    Collection Time: 04/07/24  6:04 AM   Result Value Ref Range     POC Glucose 104 65 - 140 mg/dl

## 2024-04-07 NOTE — PROGRESS NOTES
"    Progress Note - Nephrology   Sourav DAYA Saul 59 y.o. male MRN: [id number]  Unit/Bed#: [facility] 516-01 Encounter: [id number]      Assessment / Plan:  RAQUEL, on top of underlying CKD - sCr up to 2.62, in the setting of recent AAA repair with aberrant renal vessels of the aneurysm as well as supra renal clamp time  -suspect possible ATN, urinating s/p IVF  -SBP elevated now  -monitor UOP/BMP closely  -no urgent RRT needs but evaluate daily  -+UOP documented, hopeful patient in plateau phase of ATN  -monitor for renal recovery  -UA with microscopy showed small blood, trace protein, 4-10 RBCs without bacteria seen  -FeUrea high, unlikely prerenal and more likely ATN at this time  -alfred in place  -monitor off IVF for now unless lower BP noted  Probable CKD - has not seen a nephrologist in the past. Scr low 2s since , renal u/s from 2024 showed 7.6cm distal AAA with normal kidneys, left kidney cyst  Mild anemia - Hgb 10.8, monitor CBC, drop noted this admission, transfuse prn  Hypomagnesemia - resolved s/p repletion  Lactic acidosis - resolved s/p resuscitation, per ICU Team        Subjective:   He denies chest pain or shortness of breath.  No complaints.      Objective:     Vitals: Blood pressure (!) 174/86, pulse 84, temperature 98.9 °F (37.2 °C), temperature source Oral, resp. rate (!) 24, height 5' 10\" (1.778 m), weight 107 kg (235 lb 7.2 oz), SpO2 94%.,Body mass index is 33.78 kg/m².Temp (24hrs), Av.2 °F (37.3 °C), Min:98.9 °F (37.2 °C), Max:99.5 °F (37.5 °C)      Weight (last 2 days)       Date/Time Weight    24 0430 107 (235.45)    24 0540 90.7 (200)              Intake/Output Summary (Last 24 hours) at 2024 1200  Last data filed at 2024 1000  Gross per 24 hour   Intake 600.75 ml   Output 2875 ml   Net -2274.25 ml     I/O last 24 hours:  In: 1222.9 [P.O.:60; I.V.:722.9; IV Piggyback:440]  Out: 3500 [Urine:3320; Emesis/NG output:180]        Physical Exam:   Physical Exam  Vitals " reviewed.   Constitutional:       General: He is not in acute distress.     Appearance: Normal appearance. He is well-developed. He is not diaphoretic.   HENT:      Head: Normocephalic and atraumatic.      Nose: Nose normal.      Mouth/Throat:      Mouth: Mucous membranes are moist.      Pharynx: No oropharyngeal exudate.   Eyes:      General: No scleral icterus.        Right eye: No discharge.         Left eye: No discharge.   Neck:      Thyroid: No thyromegaly.   Cardiovascular:      Rate and Rhythm: Normal rate and regular rhythm.      Heart sounds: Normal heart sounds.   Pulmonary:      Effort: Pulmonary effort is normal.      Breath sounds: Normal breath sounds. No wheezing or rales.   Abdominal:      General: Bowel sounds are normal. There is no distension.      Palpations: Abdomen is soft.      Tenderness: There is no abdominal tenderness.   Genitourinary:     Comments: alfred  Musculoskeletal:         General: No swelling. Normal range of motion.      Cervical back: Neck supple.   Lymphadenopathy:      Cervical: No cervical adenopathy.   Skin:     General: Skin is warm and dry.      Findings: No rash.   Neurological:      General: No focal deficit present.      Mental Status: He is alert.      Comments: awake   Psychiatric:         Mood and Affect: Mood normal.         Behavior: Behavior normal.         Invasive Devices       Peripheral Intravenous Line  Duration             Peripheral IV 04/05/24 Right Forearm 2 days              Epidural Line  Duration             Epidural Catheter 04/05/24 2 days              Drain  Duration             Urethral Catheter Latex 16 Fr. 2 days                    Medications:    Scheduled Meds:  Current Facility-Administered Medications   Medication Dose Route Frequency Provider Last Rate    acetaminophen  975 mg Oral Q8H [facility] Patrick Lewis DO      aspirin  81 mg Oral Daily Patrick Lewis DO      atorvastatin  40 mg Oral Daily With Dinner Patrick Lewis DO      diphenhydrAMINE   25 mg Intravenous Q6H PRN Sunny Ferraro, DO      famotidine  20 mg Intravenous Q12H [facility] Sunny Ferraro, DO      heparin (porcine)  5,000 Units Subcutaneous Q8H [facility] Sunny Ferraro, DO      hydrALAZINE  10 mg Intravenous Q6H PRN Kylah Mirza PA-C      HYDROmorphone  0.5 mg Intravenous Q2H PRN Armond Diaz MD      labetalol  10 mg Intravenous Q6H PRN Kylah Mirza PA-C      lidocaine  2 patch Topical Daily Tavia Anne PA-C      metoclopramide  10 mg Intravenous Q6H [facility] Tavia Anne PA-C      metoprolol  5 mg Intravenous Q6H PRN Tavia Anne PA-C      metoprolol succinate  25 mg Oral Daily Patrick Lewis, DO      pantoprazole  40 mg Intravenous Daily Kylah Mirza PA-C      ropivacaine 0.1% and fentaNYL 2 mcg/mL   Epidural Continuous Sunny Ferraro DO         PRN Meds:.  diphenhydrAMINE    hydrALAZINE    HYDROmorphone    labetalol    metoprolol    Continuous Infusions:ropivacaine 0.1% and fentaNYL 2 mcg/mL,             LAB RESULTS:      Results from last 7 days   Lab Units 04/07/24  0513 04/06/24  0441 04/05/24  1511 04/05/24  1430 04/05/24  1328 04/05/24  1228 04/05/24  1144 04/05/24  1005 04/05/24  0836   WBC Thousand/uL 19.09* 18.03* 21.74*  --   --   --   --   --   --    HEMOGLOBIN g/dL 10.8* 11.1* 12.2  --   --   --   --   --   --    I STAT HEMOGLOBIN g/dl  --   --   --  10.2* 8.8* 8.5* 8.2* 11.6* 12.9   HEMATOCRIT % 32.0* 33.1* 35.7*  --   --   --   --   --   --    HEMATOCRIT, ISTAT %  --   --   --  30* 26* 25* 24* 34* 38   PLATELETS Thousands/uL 144* 167 171  --   --   --   --   --   --    NEUTROS PCT %  --   --  85*  --   --   --   --   --   --    LYMPHS PCT %  --   --  8*  --   --   --   --   --   --    MONOS PCT %  --   --  6  --   --   --   --   --   --    EOS PCT %  --   --  0  --   --   --   --   --   --    POTASSIUM mmol/L 3.9 4.4 5.0  --   --   --   --   --   --    CHLORIDE mmol/L 108 110* 113*  --   --   --   --   --   --    CO2  "mmol/L 24 22 18*  --   --   --   --   --   --    CO2, I-STAT mmol/L  --   --   --  21 21 21 19* 22 25   BUN mg/dL 32* 34* 24  --   --   --   --   --   --    CREATININE mg/dL 2.61* 2.62* 2.15*  --   --   --   --   --   --    CALCIUM mg/dL 7.8* 7.6* 8.0*  --   --   --   --   --   --    GLUCOSE, ISTAT mg/dl  --   --   --  141* 145* 131 127 124 114   MAGNESIUM mg/dL 2.0 1.8* 1.3*  --   --   --   --   --   --    PHOSPHORUS mg/dL  --  3.7 4.1  --   --   --   --   --   --        CUTURES:  No results found for: \"BLOODCX\", \"URINECX\"              Portions of the record may have been created with voice recognition software. Occasional wrong word or \"sound a like\" substitutions may have occurred due to the inherent limitations of voice recognition software. Read the chart carefully and recognize, using context, where substitutions have occurred.If you have any questions, please contact the dictating provider.    "

## 2024-04-08 ENCOUNTER — APPOINTMENT (INPATIENT)
Dept: NON INVASIVE DIAGNOSTICS | Facility: HOSPITAL | Age: 59
DRG: 270 | End: 2024-04-08
Payer: COMMERCIAL

## 2024-04-08 LAB
ANION GAP SERPL CALCULATED.3IONS-SCNC: 9 MMOL/L (ref 4–13)
AORTIC ROOT: 3.5 CM
APICAL FOUR CHAMBER EJECTION FRACTION: 46 %
BSA FOR ECHO PROCEDURE: 2.24 M2
BUN SERPL-MCNC: 29 MG/DL (ref 5–25)
CA-I BLD-SCNC: 1.08 MMOL/L (ref 1.12–1.32)
CALCIUM SERPL-MCNC: 8 MG/DL (ref 8.4–10.2)
CHLORIDE SERPL-SCNC: 109 MMOL/L (ref 96–108)
CO2 SERPL-SCNC: 23 MMOL/L (ref 21–32)
CREAT SERPL-MCNC: 2.25 MG/DL (ref 0.6–1.3)
E WAVE DECELERATION TIME: 193 MS
E/A RATIO: 0.78
ERYTHROCYTE [DISTWIDTH] IN BLOOD BY AUTOMATED COUNT: 13.5 % (ref 11.6–15.1)
FRACTIONAL SHORTENING: 36 (ref 28–44)
GFR SERPL CREATININE-BSD FRML MDRD: 30 ML/MIN/1.73SQ M
GLUCOSE SERPL-MCNC: 100 MG/DL (ref 65–140)
GLUCOSE SERPL-MCNC: 100 MG/DL (ref 65–140)
GLUCOSE SERPL-MCNC: 105 MG/DL (ref 65–140)
GLUCOSE SERPL-MCNC: 131 MG/DL (ref 65–140)
GLUCOSE SERPL-MCNC: 95 MG/DL (ref 65–140)
GLUCOSE SERPL-MCNC: 96 MG/DL (ref 65–140)
HCT VFR BLD AUTO: 31.2 % (ref 36.5–49.3)
HGB BLD-MCNC: 10.6 G/DL (ref 12–17)
INTERVENTRICULAR SEPTUM IN DIASTOLE (PARASTERNAL SHORT AXIS VIEW): 1.5 CM
INTERVENTRICULAR SEPTUM: 1.5 CM (ref 0.6–1.1)
LAAS-AP2: 14.6 CM2
LAAS-AP4: 13.6 CM2
LEFT ATRIUM SIZE: 2.9 CM
LEFT ATRIUM VOLUME (MOD BIPLANE): 36 ML
LEFT ATRIUM VOLUME INDEX (MOD BIPLANE): 16.1 ML/M2
LEFT INTERNAL DIMENSION IN SYSTOLE: 2.8 CM (ref 2.1–4)
LEFT VENTRICLE DIASTOLIC VOLUME (MOD BIPLANE): 122 ML
LEFT VENTRICLE DIASTOLIC VOLUME INDEX (MOD BIPLANE): 54.5 ML/M2
LEFT VENTRICLE SYSTOLIC VOLUME (MOD BIPLANE): 64 ML
LEFT VENTRICLE SYSTOLIC VOLUME INDEX (MOD BIPLANE): 28.6 ML/M2
LEFT VENTRICULAR INTERNAL DIMENSION IN DIASTOLE: 4.4 CM (ref 3.5–6)
LEFT VENTRICULAR POSTERIOR WALL IN END DIASTOLE: 1.4 CM
LEFT VENTRICULAR STROKE VOLUME: 56 ML
LV EF: 48 %
LVSV (TEICH): 56 ML
MCH RBC QN AUTO: 29.4 PG (ref 26.8–34.3)
MCHC RBC AUTO-ENTMCNC: 34 G/DL (ref 31.4–37.4)
MCV RBC AUTO: 87 FL (ref 82–98)
MV E'TISSUE VEL-LAT: 10 CM/S
MV E'TISSUE VEL-SEP: 8 CM/S
MV PEAK A VEL: 1.06 M/S
MV PEAK E VEL: 83 CM/S
MV STENOSIS PRESSURE HALF TIME: 56 MS
MV VALVE AREA P 1/2 METHOD: 3.93
PLATELET # BLD AUTO: 159 THOUSANDS/UL (ref 149–390)
PMV BLD AUTO: 10.4 FL (ref 8.9–12.7)
POTASSIUM SERPL-SCNC: 3.9 MMOL/L (ref 3.5–5.3)
RBC # BLD AUTO: 3.6 MILLION/UL (ref 3.88–5.62)
RIGHT ATRIUM AREA SYSTOLE A4C: 12.5 CM2
RIGHT VENTRICLE ID DIMENSION: 4.4 CM
SL CV LEFT ATRIUM LENGTH A2C: 4.8 CM
SL CV LV EF: 50
SL CV PED ECHO LEFT VENTRICLE DIASTOLIC VOLUME (MOD BIPLANE) 2D: 87 ML
SL CV PED ECHO LEFT VENTRICLE SYSTOLIC VOLUME (MOD BIPLANE) 2D: 31 ML
SODIUM SERPL-SCNC: 141 MMOL/L (ref 135–147)
TRICUSPID ANNULAR PLANE SYSTOLIC EXCURSION: 2 CM
WBC # BLD AUTO: 16.29 THOUSAND/UL (ref 4.31–10.16)

## 2024-04-08 PROCEDURE — 99232 SBSQ HOSP IP/OBS MODERATE 35: CPT | Performed by: INTERNAL MEDICINE

## 2024-04-08 PROCEDURE — C8929 TTE W OR WO FOL WCON,DOPPLER: HCPCS

## 2024-04-08 PROCEDURE — 85027 COMPLETE CBC AUTOMATED: CPT | Performed by: SURGERY

## 2024-04-08 PROCEDURE — 80048 BASIC METABOLIC PNL TOTAL CA: CPT | Performed by: SURGERY

## 2024-04-08 PROCEDURE — C9113 INJ PANTOPRAZOLE SODIUM, VIA: HCPCS | Performed by: PHYSICIAN ASSISTANT

## 2024-04-08 PROCEDURE — 82330 ASSAY OF CALCIUM: CPT | Performed by: SURGERY

## 2024-04-08 PROCEDURE — 99232 SBSQ HOSP IP/OBS MODERATE 35: CPT | Performed by: STUDENT IN AN ORGANIZED HEALTH CARE EDUCATION/TRAINING PROGRAM

## 2024-04-08 PROCEDURE — 82948 REAGENT STRIP/BLOOD GLUCOSE: CPT

## 2024-04-08 PROCEDURE — NC001 PR NO CHARGE: Performed by: STUDENT IN AN ORGANIZED HEALTH CARE EDUCATION/TRAINING PROGRAM

## 2024-04-08 PROCEDURE — 93306 TTE W/DOPPLER COMPLETE: CPT | Performed by: INTERNAL MEDICINE

## 2024-04-08 PROCEDURE — 99024 POSTOP FOLLOW-UP VISIT: CPT | Performed by: SURGERY

## 2024-04-08 RX ORDER — OXYCODONE HYDROCHLORIDE 5 MG/1
5 TABLET ORAL EVERY 4 HOURS PRN
Status: DISCONTINUED | OUTPATIENT
Start: 2024-04-08 | End: 2024-04-11 | Stop reason: HOSPADM

## 2024-04-08 RX ORDER — HYDROMORPHONE HCL/PF 1 MG/ML
0.5 SYRINGE (ML) INJECTION EVERY 2 HOUR PRN
Status: DISCONTINUED | OUTPATIENT
Start: 2024-04-08 | End: 2024-04-08

## 2024-04-08 RX ORDER — PANTOPRAZOLE SODIUM 40 MG/1
40 TABLET, DELAYED RELEASE ORAL
Status: DISCONTINUED | OUTPATIENT
Start: 2024-04-09 | End: 2024-04-08

## 2024-04-08 RX ORDER — OXYCODONE HYDROCHLORIDE 10 MG/1
10 TABLET ORAL EVERY 4 HOURS PRN
Status: DISCONTINUED | OUTPATIENT
Start: 2024-04-08 | End: 2024-04-11 | Stop reason: HOSPADM

## 2024-04-08 RX ORDER — TAMSULOSIN HYDROCHLORIDE 0.4 MG/1
0.8 CAPSULE ORAL
Status: DISCONTINUED | OUTPATIENT
Start: 2024-04-08 | End: 2024-04-09

## 2024-04-08 RX ORDER — ECHINACEA PURPUREA EXTRACT 125 MG
1 TABLET ORAL
Status: DISCONTINUED | OUTPATIENT
Start: 2024-04-08 | End: 2024-04-11 | Stop reason: HOSPADM

## 2024-04-08 RX ORDER — ACETAMINOPHEN 325 MG/1
650 TABLET ORAL EVERY 6 HOURS PRN
Status: DISCONTINUED | OUTPATIENT
Start: 2024-04-08 | End: 2024-04-11 | Stop reason: HOSPADM

## 2024-04-08 RX ORDER — CARVEDILOL 6.25 MG/1
6.25 TABLET ORAL 2 TIMES DAILY WITH MEALS
Status: DISCONTINUED | OUTPATIENT
Start: 2024-04-08 | End: 2024-04-09

## 2024-04-08 RX ORDER — CALCIUM GLUCONATE 20 MG/ML
2 INJECTION, SOLUTION INTRAVENOUS ONCE
Status: COMPLETED | OUTPATIENT
Start: 2024-04-08 | End: 2024-04-08

## 2024-04-08 RX ORDER — POTASSIUM CHLORIDE 20 MEQ/1
20 TABLET, EXTENDED RELEASE ORAL ONCE
Status: COMPLETED | OUTPATIENT
Start: 2024-04-08 | End: 2024-04-08

## 2024-04-08 RX ORDER — METHOCARBAMOL 500 MG/1
500 TABLET, FILM COATED ORAL EVERY 6 HOURS SCHEDULED
Status: DISCONTINUED | OUTPATIENT
Start: 2024-04-08 | End: 2024-04-11 | Stop reason: HOSPADM

## 2024-04-08 RX ADMIN — METOCLOPRAMIDE HYDROCHLORIDE 10 MG: 5 INJECTION INTRAMUSCULAR; INTRAVENOUS at 11:20

## 2024-04-08 RX ADMIN — METHOCARBAMOL 500 MG: 500 TABLET ORAL at 08:59

## 2024-04-08 RX ADMIN — ASPIRIN 81 MG: 81 TABLET, COATED ORAL at 08:59

## 2024-04-08 RX ADMIN — HEPARIN SODIUM 5000 UNITS: 5000 INJECTION INTRAVENOUS; SUBCUTANEOUS at 14:51

## 2024-04-08 RX ADMIN — PERFLUTREN 0.4 ML/MIN: 6.52 INJECTION, SUSPENSION INTRAVENOUS at 14:00

## 2024-04-08 RX ADMIN — CALCIUM GLUCONATE 2 G: 20 INJECTION, SOLUTION INTRAVENOUS at 08:59

## 2024-04-08 RX ADMIN — LABETALOL HYDROCHLORIDE 10 MG: 5 INJECTION, SOLUTION INTRAVENOUS at 00:32

## 2024-04-08 RX ADMIN — LABETALOL HYDROCHLORIDE 10 MG: 5 INJECTION, SOLUTION INTRAVENOUS at 16:11

## 2024-04-08 RX ADMIN — METHOCARBAMOL 500 MG: 500 TABLET ORAL at 12:04

## 2024-04-08 RX ADMIN — LABETALOL HYDROCHLORIDE 10 MG: 5 INJECTION, SOLUTION INTRAVENOUS at 10:04

## 2024-04-08 RX ADMIN — HEPARIN SODIUM 5000 UNITS: 5000 INJECTION INTRAVENOUS; SUBCUTANEOUS at 21:10

## 2024-04-08 RX ADMIN — FAMOTIDINE 20 MG: 10 INJECTION INTRAVENOUS at 09:07

## 2024-04-08 RX ADMIN — METHOCARBAMOL 500 MG: 500 TABLET ORAL at 17:02

## 2024-04-08 RX ADMIN — ATORVASTATIN CALCIUM 40 MG: 40 TABLET, FILM COATED ORAL at 16:11

## 2024-04-08 RX ADMIN — TAMSULOSIN HYDROCHLORIDE 0.8 MG: 0.4 CAPSULE ORAL at 16:11

## 2024-04-08 RX ADMIN — CARVEDILOL 6.25 MG: 6.25 TABLET, FILM COATED ORAL at 16:11

## 2024-04-08 RX ADMIN — POTASSIUM CHLORIDE 20 MEQ: 1500 TABLET, EXTENDED RELEASE ORAL at 11:20

## 2024-04-08 RX ADMIN — METOCLOPRAMIDE HYDROCHLORIDE 10 MG: 5 INJECTION INTRAMUSCULAR; INTRAVENOUS at 17:02

## 2024-04-08 RX ADMIN — METOCLOPRAMIDE HYDROCHLORIDE 10 MG: 5 INJECTION INTRAMUSCULAR; INTRAVENOUS at 00:32

## 2024-04-08 RX ADMIN — METOPROLOL SUCCINATE 25 MG: 25 TABLET, EXTENDED RELEASE ORAL at 08:59

## 2024-04-08 RX ADMIN — PANTOPRAZOLE SODIUM 40 MG: 40 INJECTION, POWDER, FOR SOLUTION INTRAVENOUS at 08:59

## 2024-04-08 NOTE — CONSULTS
Patient being followed by acute pain service.  See consult note dated 4/6/2024 by Dr. Diaz.    Abdiel Zarate PA-C

## 2024-04-08 NOTE — PROGRESS NOTES
Patient refusing blood pressure monitoring. Vascular Surgery AP, Maik Wood contacted. En route to bedside.

## 2024-04-08 NOTE — PROGRESS NOTES
NEPHROLOGY PROGRESS NOTE   Sourav Saul 59 y.o. male MRN: [id number]  Unit/Bed#: [facility] 516-01 Encounter: [id number]      ASSESSMENT & PLAN:  #1 RAUQEL on top of suspected CKD, noted creatinine in the low twos in February 2024  Patient was admitted with a creatinine of 2.15, increased to 2.62 on 4/6, creatinine down to 2.25 this morning  Continue with current regimen, avoid relative hypotension.  Monitor ins and outs and follow daily labs    #2 elective open orthoiliac aneurysm repair of 7.5 AAA with bilateral LIDNA aneurysm on 4/5.  Postoperative management as per vascular surgery    #3 hypertension, blood pressure elevated this morning but improving, currently on carvedilol 6.25 mg twice a day and hydralazine and metoprolol IV as needed  Needs to be on a low-sodium diet once diet advanced, consider increase carvedilol dose if blood pressure remains elevated    #4 mild anemia, hemoglobin 10.6, monitor H&H    5 prior lactic acidosis postsurgery, improved with IV fluid resuscitation, most recent lactic acid cleared      SUBJECTIVE:  Patient seen and examined, denies significant complaints, reports pain is well-controlled, no chest pain, no shortness of breath, no nausea, no vomiting, no headaches    OBJECTIVE:  Current Weight: Weight - Scale: 107 kg (235 lb 7.2 oz)  Vitals:    04/08/24 1120   BP: 147/80   Pulse: 90   Resp: (!) 25   Temp:    SpO2: 93%       Intake/Output Summary (Last 24 hours) at 4/8/2024 1129  Last data filed at 4/8/2024 1101  Gross per 24 hour   Intake 238.2 ml   Output 2565 ml   Net -2326.8 ml       General: conscious, cooperative, in not acute distress  Eyes: conjunctivae pink, anicteric sclerae  ENT: lips and mucous membranes moist  Neck: supple, no JVD  Chest: clear breath sounds bilateral, no crackles, ronchus or wheezings  CVS: distinct S1 & S2, normal rate, regular rhythm  Abdomen: soft, mildly-tender, non-distended, normoactive bowel sounds  Extremities: no significant edema of both legs  Skin:  no rash  Neuro: awake, alert, oriented  Genitourinary Recio catheter in place      Medications:    Current Facility-Administered Medications:     acetaminophen (TYLENOL) tablet 650 mg, 650 mg, Oral, Q6H PRN, Kim Auguste PA-C    aspirin (ECOTRIN LOW STRENGTH) EC tablet 81 mg, 81 mg, Oral, Daily, Patrick Lewis DO, 81 mg at 04/08/24 0859    atorvastatin (LIPITOR) tablet 40 mg, 40 mg, Oral, Daily With Dinner, Patrick Lewis DO, 40 mg at 04/07/24 1618    carvedilol (COREG) tablet 6.25 mg, 6.25 mg, Oral, BID With Meals, Kim Auguste PA-C    diphenhydrAMINE (BENADRYL) injection 25 mg, 25 mg, Intravenous, Q6H PRN, Sunny Ferraro DO    heparin (porcine) subcutaneous injection 5,000 Units, 5,000 Units, Subcutaneous, Q8H [facility], 5,000 Units at 04/07/24 2028 **AND** [CANCELED] Platelet count, , , Once, Sunny Ferraro DO    hydrALAZINE (APRESOLINE) injection 10 mg, 10 mg, Intravenous, Q4H PRN, Maik Gold PA-C    labetalol (NORMODYNE) injection 10 mg, 10 mg, Intravenous, Q4H PRN, Maik Gold PA-C, 10 mg at 04/08/24 1004    lidocaine (LIDODERM) 5 % patch 2 patch, 2 patch, Topical, Daily, Tavia Anne PA-C, 2 patch at 04/07/24 0854    methocarbamol (ROBAXIN) tablet 500 mg, 500 mg, Oral, Q6H [facility]IA, Armond Diaz MD, 500 mg at 04/08/24 0859    metoclopramide (REGLAN) injection 10 mg, 10 mg, Intravenous, Q6H [facility]IA, Tavia Anne PA-C, 10 mg at 04/08/24 1120    metoprolol (LOPRESSOR) injection 5 mg, 5 mg, Intravenous, Q6H PRN, Tavia Anne PA-C    oxyCODONE (ROXICODONE) immediate release tablet 10 mg, 10 mg, Oral, Q4H PRN, Armond Diaz MD    oxyCODONE (ROXICODONE) IR tablet 5 mg, 5 mg, Oral, Q4H PRN, Armond Diaz MD    sodium chloride (OCEAN) 0.65 % nasal spray 1 spray, 1 spray, Each Nare, Q1H PRN, Maik Gold PA-C    tamsulosin (FLOMAX) capsule 0.8 mg, 0.8 mg, Oral, Daily With Dinner, Kim Auguste PA-C    Invasive Devices:        Lab Results:   Results  "from last 7 days   Lab Units 04/08/24  0444 04/07/24  0513 04/06/24  0441 04/05/24  1511 04/05/24  1511 04/05/24  1430 04/05/24  1328 04/05/24  1228   WBC Thousand/uL 16.29* 19.09* 18.03*   < > 21.74*  --   --   --    HEMOGLOBIN g/dL 10.6* 10.8* 11.1*   < > 12.2  --   --   --    I STAT HEMOGLOBIN g/dl  --   --   --   --   --  10.2* 8.8* 8.5*   HEMATOCRIT % 31.2* 32.0* 33.1*   < > 35.7*  --   --   --    HEMATOCRIT, ISTAT %  --   --   --   --   --  30* 26* 25*   PLATELETS Thousands/uL 159 144* 167   < > 171  --   --   --    SODIUM mmol/L 141 142 143   < > 142  --   --   --    POTASSIUM mmol/L 3.9 3.9 4.4   < > 5.0  --   --   --    CHLORIDE mmol/L 109* 108 110*   < > 113*  --   --   --    CO2 mmol/L 23 24 22   < > 18*  --   --   --    CO2, I-STAT mmol/L  --   --   --   --   --  21 21 21   BUN mg/dL 29* 32* 34*   < > 24  --   --   --    CREATININE mg/dL 2.25* 2.61* 2.62*   < > 2.15*  --   --   --    CALCIUM mg/dL 8.0* 7.8* 7.6*   < > 8.0*  --   --   --    MAGNESIUM mg/dL  --  2.0 1.8*  --  1.3*  --   --   --    PHOSPHORUS mg/dL  --   --  3.7  --  4.1  --   --   --    GLUCOSE, ISTAT mg/dl  --   --   --   --   --  141* 145* 131    < > = values in this interval not displayed.       Previous work up:  See previous notes      Portions of the record may have been created with voice recognition software. Occasional wrong word or \"sound a like\" substitutions may have occurred due to the inherent limitations of voice recognition software. Read the chart carefully and recognize, using context, where substitutions have occurred.If you have any questions, please contact the dictating provider.  "

## 2024-04-08 NOTE — PROGRESS NOTES
[facility]  Progress Note: Critical Care  Name: Sourav Saul 59 y.o. male I MRN: [id number]  Unit/Bed#: [facility] 516-01 I Date of Admission: 4/5/2024   Date of Service: 4/8/2024 I Hospital Day: 3    Assessment/Plan   Neuro:   Diagnosis: Acute post op pain   Plan:   PCEA Ropivacaine/Fenntanyl in place, plan to remove today per Acute Pain.   Scheduled PO Tylenol 975 mg q8  Consider PO pain regimen after PCEA removal.  IV Dilaudid 0.2 mg PRN for breakthrough   Lidoderm patches  Encourage OOB    Diagnosis: Paresthesias  Plan:    4/5 CTH has no acute findings   Neuro consulted  Continue close monitoring    CV:   Diagnosis: AAA, iliac artery aneurysm  Plan:   4/5 Open AAA repair with 18/x9 mm dacron bifurcated graft to R external iliac artery and L common iliac artery  Vascular surgery primary   Aspirin and atorvastatin   q4 Neurovascular checks    Diagnosis: Hypertension  Plan:   Toprol-XL 25 mg daily  Metoprolol 5 mg IV q6h  PRN   Hydralazine and labetalol PRN for goal SBP < 180 mmHg  Diagnosis: Hyperlipidemia  Plan:   Atorvastatin 40 mg    Pulm:  Diagnosis: Acute post-op respiratory insufficiency  Plan  93-94% on RA  IS, OOB    GI:   Diagnosis: At risk for post-op ileus  Plan:   NPO, Sips of clears per vascular  Reglan 10 mg IV q6h  Pepcid 20 mg IV q12h  Protonix 40mg IV daily     No current bowel regimen as pt is having Bms.    :   Diagnosis: RAQUEL on CKD  `Plan:   4/6 Post Op Cr 2.65, Baseline Cr ~ 2.0,  4/8 Cr down to 2.25, continue to trend renal function  Strict I/Os  Nephro following, suspicious of ATN  Last 24 hr UO = 2430 mL (.96 mL/kg/hr)  DC alfred     Diagnosis: BPH   Plan:   Home flomax on hold  F/E/N:      F: Off IVF   E: Replete as needed   N: Diet advanced to clears per vascular today     Heme/Onc:   Diagnosis: ABLA  Plan:   Intra-op 2 PRBC, 2 FFP  Hgb 10.6, stable    DVT Prophylaxis: Subq Heparin, hold today for PCEA removal      Endo:   Diagnosis: Prediabetes  Plan:    HbA1c 5.8%, goal -180  Q6 Accuchecks    ID:   Diagnosis: Leukocytosis  Plan:   WBC down to 16.2 from 19.0. Likely reactive WBC post op  Trend WBC/fever curve    MSK/Skin:   Diagnosis: Post-op   Plan:   PT/OT     Disposition: Stepdown Level 1    ICU Core Measures     A: Assess, Prevent, and Manage Pain Has pain been assessed? Yes  Need for changes to pain regimen? No   B: Both SAT/SAT  N/A   C: Choice of Sedation RASS Goal: 0 Alert and Calm  Need for changes to sedation or analgesia regimen? No   D: Delirium CAM-ICU: Negative   E: Early Mobility  Plan for early mobility? Yes   F: Family Engagement Plan for family engagement today? Yes       Review of Invasive Devices:    Hemal Plan: Continue for accurate I/O monitoring for 48 hours        Prophylaxis:  VTE VTE covered by:  heparin (porcine), Subcutaneous, 5,000 Units at 04/07/24 2028       Stress Ulcer  covered byFamotidine (PF) (PEPCID) injection 20 mg [[id number]], pantoprazole (PROTONIX) injection 40 mg [[id number]]        Significant 24hr Events     24hr events: Around 10 pm nursing staff reported pt was frustrated and refusing BP monitoring. Vascular team went bedside to assess and pt agreed to q2hr BP checks. Bps overnight ranging from 170-190/80 and was given PRN labetalol and hydralazine.      Subjective     Review of Systems   Respiratory:  Negative for shortness of breath.    Cardiovascular:  Negative for chest pain and palpitations.   Gastrointestinal:  Negative for constipation, diarrhea and nausea.   Musculoskeletal:  Negative for back pain.        Objective                            Vitals I/O      Most Recent Min/Max in 24hrs   Temp 98.5 °F (36.9 °C) Temp  Min: 98.5 °F (36.9 °C)  Max: 98.9 °F (37.2 °C)   Pulse 95 Pulse  Min: 80  Max: 101   Resp 21 Resp  Min: 20  Max: 37   /86 BP  Min: 155/88  Max: 205/98   O2 Sat 93 % SpO2  Min: 91 %  Max: 96 %      Intake/Output Summary (Last 24 hours) at 4/8/2024 0625  Last data filed at 4/8/2024  0400  Gross per 24 hour   Intake 289.35 ml   Output 2430 ml   Net -2140.65 ml       Diet NPO; Sips of clear liquids    Invasive Monitoring           Physical Exam   Physical Exam  Eyes:      Extraocular Movements: Extraocular movements intact.      Pupils: Pupils are equal, round, and reactive to light.   Skin:     General: Skin is warm.      Capillary Refill: Capillary refill takes less than 2 seconds.   HENT:      Head: Normocephalic and atraumatic.      Mouth/Throat:      Mouth: Mucous membranes are moist.   Cardiovascular:      Rate and Rhythm: Normal rate and regular rhythm.      Pulses: Normal pulses. Pulses are Radial, Dorsalis Pedis, Post.Tibialis 2+ B/L.      Heart sounds: Normal heart sounds.   Musculoskeletal:      Right lower leg: No edema.      Left lower leg: No edema.   Abdominal: General: Bowel sounds are normal. There is distension.     Palpations: Abdomen is soft.      Comments: Abdominal dressing C/D/I   Constitutional:       Appearance: He is well-developed and well-nourished.   Pulmonary:      Effort: Pulmonary effort is normal.      Breath sounds: Normal breath sounds.   Neurological:      General: No focal deficit present.      Mental Status: He is alert and oriented to person, place and time.   Genitourinary/Anorectal:  Recio present.          Diagnostic Studies      EKG: Reviewed  Imaging:  I have personally reviewed pertinent reports.       Medications:  Scheduled PRN   acetaminophen, 975 mg, Q8H MARIA  aspirin, 81 mg, Daily  atorvastatin, 40 mg, Daily With Dinner  calcium gluconate, 2 g, Once  famotidine, 20 mg, Q12H MARIA  heparin (porcine), 5,000 Units, Q8H MARIA  lidocaine, 2 patch, Daily  metoclopramide, 10 mg, Q6H MARIA  metoprolol succinate, 25 mg, Daily  pantoprazole, 40 mg, Daily      diphenhydrAMINE, 25 mg, Q6H PRN  hydrALAZINE, 10 mg, Q4H PRN  HYDROmorphone, 0.2 mg, Q2H PRN  labetalol, 10 mg, Q4H PRN  metoprolol, 5 mg, Q6H PRN  sodium chloride, 1 spray, Q1H PRN       Continuous     ropivacaine 0.1% and fentaNYL 2 mcg/mL,          Labs:    CBC    Recent Labs     04/07/24  0513 04/08/24  0444   WBC 19.09* 16.29*   HGB 10.8* 10.6*   HCT 32.0* 31.2*   * 159     BMP    Recent Labs     04/07/24  0513 04/08/24  0444   SODIUM 142 141   K 3.9 3.9    109*   CO2 24 23   AGAP 10 9   BUN 32* 29*   CREATININE 2.61* 2.25*   CALCIUM 7.8* 8.0*       Coags    No recent results     Additional Electrolytes  Recent Labs     04/07/24  0513 04/08/24  0444   MG 2.0  --    CAIONIZED  --  1.08*          Blood Gas    No recent results  No recent results LFTs  No recent results    Infectious  No recent results  Glucose  Recent Labs     04/07/24  0513 04/08/24  0444   GLUC 107 96               Bryan Edwards

## 2024-04-08 NOTE — QUICK NOTE
"QUICK NOTE  Sourav Saul 59 y.o. male MRN: [id number]  Unit/Bed#: [facility] 516-01 Encounter: [id number]       TT by nurse regarding patient agitation and refusal of BP cuff. Arrived at bedside shortly after.     Patient stating that he is frustrated with the constant blood pressure checks and statin \"the blood pressure cuff is squeezing hard and leaving marks on my arm\". I discussed with the patient that this is the method of obtaining a blood pressure and also checked the appropriate size of the cuff. After discussing the importance of patients blood pressure monitoring POD2 from major vascular surgery and his consisenet elevated pressures, patient reported understanding. Patient agreeable to BP checks ever 2 hours. Will continue BP management with PRNs throughout the night.     Maik Gold PA-C   Vascular Surgery     "

## 2024-04-08 NOTE — PROGRESS NOTES
Progress Note - Acute Pain Service    Sourav Saul 59 y.o. male MRN: [id number]  Unit/Bed#: [facility] 516-01 Encounter: [id number]      Sourav Saul is a 59 y.o. male with PMHx of CKD III, HTN, BPH who presented with elective repair of AAA repair. A perioperative thoracic epidural was placed for post-operative analgesia. APS consulted for post-operative pain/epidural management.     Upon bedside evaluation, Ed was resting in bed. He reports minimal to no abdominal pain. Pain continues to well controlled off epidural support. +Flatus/BM. Tolerating PO. Denies opioid-induced side effects including nausea/vomiting/itching/constipation. Able to get OOB into chair.    * Abdominal aortic aneurysm (AAA) ([facility])  Assessment & Plan  S/p open AAA repair with 18/x9 mm dacron bifurcated graft to R external iliac artery and L common iliac artery on 4/5  Thoracic epidural placed on 4/5    Plan:  Discontinue thoracic epidural 0.1% ropivacaine/2mcg/ml fentanyl @6/4/10/3  Discontinue IV dilaudid to 0.2mg q2hr PRN for breakthrough pain  Transition to PO oxycodone 5/10mg q4hr PRN for mod-severe pain  Continue other MMA  PO Tylenol 975mg q8hr norma  NSAIDs contraindicated in setting of CKD  Encourage OOB, PT/OT  Bowel regimen per surgical team        APS will continue to follow. Please contact Acute Pain Service - via [employer] from [phone] with additional questions or concerns. See [employer] or [employer] for additional contacts and after hours information.     Pain History  Current pain location(s):  Pain Score: 0  Pain Location/Orientation: Location: Abdomen  Pain Scale: Pain Assessment Tool: 0-10  24 hour history: See above    Opioid requirement previous 24 hours: N/A    Meds/Allergies   all current active meds have been reviewed    No Known Allergies    Objective        Vitals:    04/08/24 0038 04/08/24 0245 04/08/24 0445 04/08/24 0711   BP: (!) 180/83 (!) 177/86 162/86 (!) 171/87   Pulse: 94 90 95 88   Resp: (!) 30 (!) 24 21 22   Temp:    98.5 °F (36.9 °C)    TempSrc:   Oral    SpO2: 91% 93% 93% 93%   Weight:       Height:             Physical Exam  Vitals reviewed.   HENT:      Head: Normocephalic.      Mouth/Throat:      Mouth: Mucous membranes are dry.   Eyes:      Extraocular Movements: Extraocular movements intact.   Cardiovascular:      Rate and Rhythm: Normal rate.      Pulses: Normal pulses.   Pulmonary:      Effort: Pulmonary effort is normal.   Abdominal:      General: Abdomen is flat.   Musculoskeletal:         General: Normal range of motion.      Cervical back: Normal range of motion.   Skin:     General: Skin is dry.   Neurological:      General: No focal deficit present.      Mental Status: He is alert and oriented to person, place, and time.   Psychiatric:         Mood and Affect: Mood normal.       Epidural: Site clean/dry/intact, no surrounding erythema/edema/induration, infusion functioning appropriately     Lab Results:   Estimated Creatinine Clearance: 43.3 mL/min (A) (by C-G formula based on SCr of 2.25 mg/dL (H)).  Lab Results   Component Value Date    WBC 16.29 (H) 04/08/2024    HGB 10.6 (L) 04/08/2024    HCT 31.2 (L) 04/08/2024     04/08/2024         Component Value Date/Time    K 3.9 04/08/2024 0444     (H) 04/08/2024 0444    CO2 23 04/08/2024 0444    CO2 21 04/05/2024 1430    BUN 29 (H) 04/08/2024 0444    CREATININE 2.25 (H) 04/08/2024 0444         Component Value Date/Time    CALCIUM 8.0 (L) 04/08/2024 0444       Imaging Studies/EKG: I have personally reviewed pertinent reports.       Counseling / Coordination of Care  Total floor / unit time spent today 20 minutes minutes. Greater than 50% of total time was spent with the patient and / or family counseling and / or coordination of care.     Please note that the APS provides consultative services regarding pain management only.  With the exception of ketamine and epidural infusions and except when indicated, final decisions regarding starting or changing  doses of analgesic medications are at the discretion of the consulting service.    Armond Diaz MD   Acute Pain Service

## 2024-04-09 ENCOUNTER — DOCUMENTATION (OUTPATIENT)
Dept: VASCULAR SURGERY | Facility: CLINIC | Age: 59
End: 2024-04-09

## 2024-04-09 LAB
ANION GAP SERPL CALCULATED.3IONS-SCNC: 10 MMOL/L (ref 4–13)
BUN SERPL-MCNC: 30 MG/DL (ref 5–25)
CALCIUM SERPL-MCNC: 7.9 MG/DL (ref 8.4–10.2)
CHLORIDE SERPL-SCNC: 107 MMOL/L (ref 96–108)
CO2 SERPL-SCNC: 22 MMOL/L (ref 21–32)
CREAT SERPL-MCNC: 2.17 MG/DL (ref 0.6–1.3)
ERYTHROCYTE [DISTWIDTH] IN BLOOD BY AUTOMATED COUNT: 13.3 % (ref 11.6–15.1)
GFR SERPL CREATININE-BSD FRML MDRD: 32 ML/MIN/1.73SQ M
GLUCOSE SERPL-MCNC: 100 MG/DL (ref 65–140)
GLUCOSE SERPL-MCNC: 102 MG/DL (ref 65–140)
GLUCOSE SERPL-MCNC: 102 MG/DL (ref 65–140)
GLUCOSE SERPL-MCNC: 165 MG/DL (ref 65–140)
GLUCOSE SERPL-MCNC: 98 MG/DL (ref 65–140)
HCT VFR BLD AUTO: 29.3 % (ref 36.5–49.3)
HGB BLD-MCNC: 10 G/DL (ref 12–17)
MCH RBC QN AUTO: 29.3 PG (ref 26.8–34.3)
MCHC RBC AUTO-ENTMCNC: 34.1 G/DL (ref 31.4–37.4)
MCV RBC AUTO: 86 FL (ref 82–98)
PLATELET # BLD AUTO: 192 THOUSANDS/UL (ref 149–390)
PMV BLD AUTO: 10.2 FL (ref 8.9–12.7)
POTASSIUM SERPL-SCNC: 3.9 MMOL/L (ref 3.5–5.3)
RBC # BLD AUTO: 3.41 MILLION/UL (ref 3.88–5.62)
SODIUM SERPL-SCNC: 139 MMOL/L (ref 135–147)
WBC # BLD AUTO: 13.94 THOUSAND/UL (ref 4.31–10.16)

## 2024-04-09 PROCEDURE — 80048 BASIC METABOLIC PNL TOTAL CA: CPT

## 2024-04-09 PROCEDURE — 82948 REAGENT STRIP/BLOOD GLUCOSE: CPT

## 2024-04-09 PROCEDURE — 97530 THERAPEUTIC ACTIVITIES: CPT

## 2024-04-09 PROCEDURE — 97116 GAIT TRAINING THERAPY: CPT

## 2024-04-09 PROCEDURE — 99024 POSTOP FOLLOW-UP VISIT: CPT | Performed by: SURGERY

## 2024-04-09 PROCEDURE — 99232 SBSQ HOSP IP/OBS MODERATE 35: CPT | Performed by: INTERNAL MEDICINE

## 2024-04-09 PROCEDURE — 97535 SELF CARE MNGMENT TRAINING: CPT

## 2024-04-09 PROCEDURE — 85027 COMPLETE CBC AUTOMATED: CPT

## 2024-04-09 PROCEDURE — 99232 SBSQ HOSP IP/OBS MODERATE 35: CPT | Performed by: STUDENT IN AN ORGANIZED HEALTH CARE EDUCATION/TRAINING PROGRAM

## 2024-04-09 RX ORDER — CARVEDILOL 6.25 MG/1
6.25 TABLET ORAL ONCE
Status: COMPLETED | OUTPATIENT
Start: 2024-04-09 | End: 2024-04-09

## 2024-04-09 RX ORDER — TAMSULOSIN HYDROCHLORIDE 0.4 MG/1
0.4 CAPSULE ORAL
Status: DISCONTINUED | OUTPATIENT
Start: 2024-04-09 | End: 2024-04-11 | Stop reason: HOSPADM

## 2024-04-09 RX ORDER — CARVEDILOL 12.5 MG/1
12.5 TABLET ORAL 2 TIMES DAILY WITH MEALS
Status: DISCONTINUED | OUTPATIENT
Start: 2024-04-09 | End: 2024-04-10

## 2024-04-09 RX ORDER — POTASSIUM CHLORIDE 20 MEQ/1
20 TABLET, EXTENDED RELEASE ORAL ONCE
Status: COMPLETED | OUTPATIENT
Start: 2024-04-09 | End: 2024-04-09

## 2024-04-09 RX ADMIN — METHOCARBAMOL 500 MG: 500 TABLET ORAL at 18:07

## 2024-04-09 RX ADMIN — HEPARIN SODIUM 5000 UNITS: 5000 INJECTION INTRAVENOUS; SUBCUTANEOUS at 14:31

## 2024-04-09 RX ADMIN — HEPARIN SODIUM 5000 UNITS: 5000 INJECTION INTRAVENOUS; SUBCUTANEOUS at 05:53

## 2024-04-09 RX ADMIN — METHOCARBAMOL 500 MG: 500 TABLET ORAL at 05:53

## 2024-04-09 RX ADMIN — METHOCARBAMOL 500 MG: 500 TABLET ORAL at 00:34

## 2024-04-09 RX ADMIN — METHOCARBAMOL 500 MG: 500 TABLET ORAL at 12:13

## 2024-04-09 RX ADMIN — TAMSULOSIN HYDROCHLORIDE 0.4 MG: 0.4 CAPSULE ORAL at 17:19

## 2024-04-09 RX ADMIN — ASPIRIN 81 MG: 81 TABLET, COATED ORAL at 08:12

## 2024-04-09 RX ADMIN — CARVEDILOL 6.25 MG: 6.25 TABLET, FILM COATED ORAL at 08:20

## 2024-04-09 RX ADMIN — METOCLOPRAMIDE HYDROCHLORIDE 10 MG: 5 INJECTION INTRAMUSCULAR; INTRAVENOUS at 12:13

## 2024-04-09 RX ADMIN — HEPARIN SODIUM 5000 UNITS: 5000 INJECTION INTRAVENOUS; SUBCUTANEOUS at 21:54

## 2024-04-09 RX ADMIN — POTASSIUM CHLORIDE 20 MEQ: 1500 TABLET, EXTENDED RELEASE ORAL at 08:12

## 2024-04-09 RX ADMIN — CARVEDILOL 6.25 MG: 6.25 TABLET, FILM COATED ORAL at 08:12

## 2024-04-09 RX ADMIN — METOCLOPRAMIDE HYDROCHLORIDE 10 MG: 5 INJECTION INTRAMUSCULAR; INTRAVENOUS at 05:53

## 2024-04-09 RX ADMIN — CARVEDILOL 12.5 MG: 12.5 TABLET, FILM COATED ORAL at 17:19

## 2024-04-09 RX ADMIN — ATORVASTATIN CALCIUM 40 MG: 40 TABLET, FILM COATED ORAL at 17:19

## 2024-04-09 NOTE — PHYSICAL THERAPY NOTE
PHYSICAL THERAPY NOTE          Patient Name: Sourav Saul  Today's Date: 4/9/2024 04/09/24 1350   PT Last Visit   PT Visit Date 04/09/24   Note Type   Note Type Treatment   Pain Assessment   Pain Assessment Tool 0-10   Pain Score No Pain   Restrictions/Precautions   Weight Bearing Precautions Per Order No   Other Precautions Impulsive;Cognitive;Chair Alarm;Bed Alarm   General   Chart Reviewed Yes   Response to Previous Treatment Patient with no complaints from previous session.   Family/Caregiver Present Yes  (mother)   Cognition   Overall Cognitive Status Impaired   Arousal/Participation Responsive;Cooperative   Attention Attends with cues to redirect   Orientation Level Oriented X4   Memory Decreased recall of precautions   Following Commands Follows one step commands without difficulty   Subjective   Subjective pt pleasant and cooperative throughout therapy session. pt received seated in bedside recliner   Bed Mobility   Supine to Sit Unable to assess   Sit to Supine Unable to assess   Transfers   Sit to Stand 6  Modified independent   Stand to Sit 6  Modified independent   Additional Comments transfers w RW   Ambulation/Elevation   Gait pattern Short stride   Gait Assistance 5  Supervision   Additional items Verbal cues   Assistive Device Rolling walker   Distance 500'   Stair Management Assistance 5  Supervision   Additional items Verbal cues   Stair Management Technique Alternating pattern;One rail R;Foreward   Number of Stairs 14   Balance   Static Sitting Good   Dynamic Sitting Good   Static Standing Good   Dynamic Standing Fair +   Ambulatory Fair +   Endurance Deficit   Endurance Deficit No   Activity Tolerance   Activity Tolerance Patient tolerated treatment well   Nurse Made Aware RN cleared and updated   Assessment   Prognosis Good   Assessment Patient was received seated in bedside recliner . Patient was  agreeable to therapy services today. PT session focused on gait today in order to improve functional mobility and independence. Functional mobility was performed as described above.  Pt was eager to participate in therapy services this date. Pt did not require any hands on assist throughout therapy session. Pt denied any increase in symptoms or pain throughout therapy session today. Pt was also able to complete stair negotiation without any safety concerns. Pt was returned to room and made comfortable in bedside recliner. Pt with no questions or concerns regarding d/c home; appears to be functioning at/ near baseline mobility levels. Pt with no further acute inpatient PT needs at this time- please re-consult if needed. PT to discharge pt at this time. Encourage pt to ambulate at least 3-4x/day with restorative/ nursing staff while remaining in hospital. The patient's AM-PAC Basic Mobility Inpatient Short Form Raw Score is 24, Standardized Score is 57.68. Based on AM-PAC scoring and patient presentation, PT currently recommending No Post Acute Rehab Needs. Please also refer to the recommendation of the Physical Therapist for safe discharge planning.   Barriers to Discharge None   Goals   Patient Goals to go home   Plan   Progress Discontinue PT   Discharge Recommendation   Rehab Resource Intensity Level, PT No post-acute rehabilitation needs   AM-PAC Basic Mobility Inpatient   Turning in Flat Bed Without Bedrails 4   Lying on Back to Sitting on Edge of Flat Bed Without Bedrails 4   Moving Bed to Chair 4   Standing Up From Chair Using Arms 4   Walk in Room 4   Climb 3-5 Stairs With Railing 4   Basic Mobility Inpatient Raw Score 24   Basic Mobility Standardized Score 57.68   [facility] Highest Level Of Mobility   -HLM Goal 8: Walk 250 feet or more   -HLM Achieved 8: Walk 250 feet ot more   Education   Education Provided Mobility training   Patient Demonstrates acceptance/verbal understanding   End of Consult    Patient Position at End of Consult Bedside chair;Bed/Chair alarm activated;All needs within reach       Meño Lovett PT, DPT

## 2024-04-09 NOTE — PLAN OF CARE
Problem: PAIN - ADULT  Goal: Verbalizes/displays adequate comfort level or baseline comfort level  Description: Interventions:  - Encourage patient to monitor pain and request assistance  - Assess pain using appropriate pain scale  - Administer analgesics based on type and severity of pain and evaluate response  - Implement non-pharmacological measures as appropriate and evaluate response  - Consider cultural and social influences on pain and pain management  - Notify physician/advanced practitioner if interventions unsuccessful or patient reports new pain  Outcome: Progressing     Problem: INFECTION - ADULT  Goal: Absence or prevention of progression during hospitalization  Description: INTERVENTIONS:  - Assess and monitor for signs and symptoms of infection  - Monitor lab/diagnostic results  - Monitor all insertion sites, i.e. indwelling lines, tubes, and drains  - Monitor endotracheal if appropriate and nasal secretions for changes in amount and color  - [place] appropriate cooling/warming therapies per order  - Administer medications as ordered  - Instruct and encourage patient and family to use good hand hygiene technique  - Identify and instruct in appropriate isolation precautions for identified infection/condition  Outcome: Progressing  Goal: Absence of fever/infection during neutropenic period  Description: INTERVENTIONS:  - Monitor WBC    Outcome: Progressing     Problem: SAFETY ADULT  Goal: Patient will remain free of falls  Description: INTERVENTIONS:  - Educate patient/family on patient safety including physical limitations  - Instruct patient to call for assistance with activity   - Consult OT/PT to assist with strengthening/mobility   - Keep Call bell within reach  - Keep bed low and locked with side rails adjusted as appropriate  - Keep care items and personal belongings within reach  - Initiate and maintain comfort rounds  - Make Fall Risk Sign visible to staff  - Offer Toileting every  Hours,  in advance of need  - Initiate/Maintain alarm  - Obtain necessary fall risk management equipment:   - Apply yellow socks and bracelet for high fall risk patients  - Consider moving patient to room near nurses station  Outcome: Progressing  Goal: Maintain or return to baseline ADL function  Description: INTERVENTIONS:  -  Assess patient's ability to carry out ADLs; assess patient's baseline for ADL function and identify physical deficits which impact ability to perform ADLs (bathing, care of mouth/teeth, toileting, grooming, dressing, etc.)  - Assess/evaluate cause of self-care deficits   - Assess range of motion  - Assess patient's mobility; develop plan if impaired  - Assess patient's need for assistive devices and provide as appropriate  - Encourage maximum independence but intervene and supervise when necessary  - Involve family in performance of ADLs  - Assess for home care needs following discharge   - Consider OT consult to assist with ADL evaluation and planning for discharge  - Provide patient education as appropriate  Outcome: Progressing  Goal: Maintains/Returns to pre admission functional level  Description: INTERVENTIONS:  - Perform AM-PAC 6 Click Basic Mobility/ Daily Activity assessment daily.  - Set and communicate daily mobility goal to care team and patient/family/caregiver.   - Collaborate with rehabilitation services on mobility goals if consulted  - Perform Range of Motion  times a day.  - Reposition patient every  hours.  - Dangle patient  times a day  - Stand patient  times a day  - Ambulate patient  times a day  - Out of bed to chair  times a day   - Out of bed for meals  times a day  - Out of bed for toileting  - Record patient progress and toleration of activity level   Outcome: Progressing     Problem: DISCHARGE PLANNING  Goal: Discharge to home or other facility with appropriate resources  Description: INTERVENTIONS:  - Identify barriers to discharge w/patient and caregiver  - Arrange for  needed discharge resources and transportation as appropriate  - Identify discharge learning needs (meds, wound care, etc.)  - Arrange for interpretive services to assist at discharge as needed  - Refer to Case Management Department for coordinating discharge planning if the patient needs post-hospital services based on physician/advanced practitioner order or complex needs related to functional status, cognitive ability, or social support system  Outcome: Progressing     Problem: Knowledge Deficit  Goal: Patient/family/caregiver demonstrates understanding of disease process, treatment plan, medications, and discharge instructions  Description: Complete learning assessment and assess knowledge base.  Interventions:  - Provide teaching at level of understanding  - Provide teaching via preferred learning methods  Outcome: Progressing

## 2024-04-09 NOTE — PROGRESS NOTES
Progress Note - Acute Pain Service    Sourav Saul 59 y.o. male MRN: [id number]  Unit/Bed#: [facility] 516-01 Encounter: [id number]      Sourav Saul is a 59 y.o. male with PMHx of CKD III, HTN, BPH who presented with elective repair of AAA repair. A perioperative thoracic epidural was placed for post-operative analgesia. APS consulted for post-operative pain/epidural management.      Upon bedside evaluation, Ed was resting in the chair. He reports minimal to no abdominal pain. Pain continues to well controlled. Has not used opioids in past 24 hours. +Flatus/BM. Tolerating PO. Denies opioid-induced side effects including nausea/vomiting/itching/constipation. Able to get OOB into chair.      * Abdominal aortic aneurysm (AAA) ([facility])  Assessment & Plan  S/p open AAA repair with 18/x9 mm dacron bifurcated graft to R external iliac artery and L common iliac artery on 4/5  Thoracic epidural placed on 4/5, removed on 4/8    Plan:    Continu PO oxycodone 5/10mg q4hr PRN for mod-severe pain  Continue other MMA  PO Tylenol 975mg q8hr norma  NSAIDs contraindicated in setting of CKD  Encourage OOB, PT/OT  Bowel regimen per surgical team        APS will sign off at this time. Thank you for the consult. All opioids and other analgesics to be written at discretion of primary team. Please contact Acute Pain Service - via [employer] from [phone] with additional questions or concerns. See [employer] or [employer] for additional contacts and after hours information.    Pain History  Current pain location(s):  Pain Score: 0  Pain Location/Orientation: Location: Abdomen  Pain Scale: Pain Assessment Tool: 0-10  24 hour history: See above    Opioid requirement previous 24 hours: N/A    Meds/Allergies   all current active meds have been reviewed    No Known Allergies    Objective        Vitals:    04/09/24 0000 04/09/24 0040 04/09/24 0600 04/09/24 0812   BP:  (!) 172/93 161/92 162/88   BP Location:       Pulse: 93 93 92 95   Resp: (!) 27 (!) 25 21     Temp:   97.9 °F (36.6 °C)    TempSrc:   Oral    SpO2: 91% 93% 94%    Weight:       Height:             Physical Exam  Vitals reviewed.   HENT:      Head: Normocephalic.      Mouth/Throat:      Mouth: Mucous membranes are dry.   Eyes:      Extraocular Movements: Extraocular movements intact.   Cardiovascular:      Rate and Rhythm: Normal rate.      Pulses: Normal pulses.   Pulmonary:      Effort: Pulmonary effort is normal.   Abdominal:      General: Abdomen is flat.   Musculoskeletal:         General: Normal range of motion.      Cervical back: Normal range of motion.   Skin:     General: Skin is dry.   Neurological:      General: No focal deficit present.      Mental Status: He is alert.   Psychiatric:         Mood and Affect: Mood normal.           Lab Results:   Estimated Creatinine Clearance: 44.9 mL/min (A) (by C-G formula based on SCr of 2.17 mg/dL (H)).  Lab Results   Component Value Date    WBC 13.94 (H) 04/09/2024    HGB 10.0 (L) 04/09/2024    HCT 29.3 (L) 04/09/2024     04/09/2024         Component Value Date/Time    K 3.9 04/09/2024 0552     04/09/2024 0552    CO2 22 04/09/2024 0552    CO2 21 04/05/2024 1430    BUN 30 (H) 04/09/2024 0552    CREATININE 2.17 (H) 04/09/2024 0552         Component Value Date/Time    CALCIUM 7.9 (L) 04/09/2024 0552       Imaging Studies/EKG: I have personally reviewed pertinent reports.       Counseling / Coordination of Care  Total floor / unit time spent today 20 minutes minutes. Greater than 50% of total time was spent with the patient and / or family counseling and / or coordination of care.     Please note that the APS provides consultative services regarding pain management only.  With the exception of ketamine and epidural infusions and except when indicated, final decisions regarding starting or changing doses of analgesic medications are at the discretion of the consulting service.    Armond Diaz MD   Acute Pain Service

## 2024-04-09 NOTE — PROGRESS NOTES
[facility]  Progress Note: Critical Care  Name: Sourav Saul 59 y.o. male I MRN: [id number]  Unit/Bed#: [facility] 516-01 I Date of Admission: 4/5/2024   Date of Service: 4/9/2024 I Hospital Day: 4    Assessment/Plan   Neuro:   Diagnosis: Acute post op pain   Plan:    PO Tylenol 650 mg q6 PRN  PO Oxycodone 5/10 mg q4h for moderate/severe pain  Lidoderm patches  Robaxin 500 mg PO q6  Encourage OOB, PT/OT    Diagnosis: Paresthesias  Plan:    4/5 CTH has no acute findings  Pt states symptoms are now at baseline.    CV:   Diagnosis: AAA, iliac artery aneurysm  Plan:   4/5 Open AAA repair with 18/x9 mm dacron bifurcated graft to R external iliac artery and L common iliac artery  Vascular surgery primary   Aspirin and atorvastatin   q4 Neurovascular checks    Diagnosis: Hypertension  BP goal systolic <180.  Plan:   Increase carvedilol to 12.5 mg BID  Metoprolol 5 mg IV q6h PRN   Hydralazine and labetalol PRN for goal SBP < 180 mmHg  Holding home lisinopril 20 mg in setting of RAQUEL.     Diagnosis: TTE with EF of 50%  4/8 ECHO:  Left Ventricle: Left ventricular cavity size is normal. Wall thickness is increased. There is moderate concentric hypertrophy. The left ventricular ejection fraction is 50%. Systolic function is low normal. Diastolic function is normal. The following segments are hypokinetic: mid inferoseptal, apical septal, apical inferior and apex. All other segments are normal.     Plan:  Increase carvedilol 12.5 mg BID  Holding home lisinopril 20 mg in setting of RAQUEL. May restart once resolved / outpatient.    Diagnosis: Hyperlipidemia  Plan:   Atorvastatin 40 mg    Pulm:  Diagnosis: Acute post-op respiratory insufficiency  Plan  94-95% on RA  IS, OOB    GI:   Diagnosis: At risk for post-op ileus  Plan:   Clear surgical diet per vascular  Reglan 10 mg IV q6h  Will consider Zofran once he has regular diet.     No current bowel regimen as pt is having Bms.    :   Diagnosis: RAQUEL  on CKD  `Plan:   4/6 Post Op Cr 2.65, Baseline Cr ~ 2.0,  4/8 Cr down to 2.25  4/9 Cr down to 2.17, appears to be approaching baseline  Continue I/Os  Nephro following  Last 24 hr UO = 1985 mL (.78 mL/kg/hr)    Diagnosis: BPH   Plan:   Home Flomax 0.4 mg PO daily    F/E/N:                 F: Off IVF              E: Replete as needed              N: Surgical clear diet per vascular     Heme/Onc:   Diagnosis: ABLA  Plan:   Intra-op 2 PRBC, 2 FFP  4/9 Hg 10.0 from 10.6, stable, transfuse <7    DVT prophylaxis: SQH    Endo:   Diagnosis: Prediabetes  Plan:   HbA1c 5.8%, goal -180, Bg have been ~100.   Q6 Accuchecks, make ACHS once diet advances.    ID:   Diagnosis: Leukocytosis  Plan:   WBC down to13.94 from 16.2.  Trend WBC/fever curve    MSK/Skin:   PT/OT  OOB to chair    Disposition: Med surg     ICU Core Measures     A: Assess, Prevent, and Manage Pain Has pain been assessed? Yes  Need for changes to pain regimen? No   B: Both SAT/SAT  N/A   C: Choice of Sedation RASS Goal: 0 Alert and Calm  Need for changes to sedation or analgesia regimen? No   D: Delirium CAM-ICU: Negative   E: Early Mobility  Plan for early mobility? Yes   F: Family Engagement Plan for family engagement today? Yes       Review of Invasive Devices:            Prophylaxis:  VTE VTE covered by:  heparin (porcine), Subcutaneous, 5,000 Units at 04/09/24 0553       Stress Ulcer  not ordered        Significant 24hr Events     24hr events: No significant events. Bps remain mostly 170-190 systolic. PCEA removed yesterday, advanced to clear liquid diet, tolerating PO meds.     Subjective     Review of Systems   Constitutional:  Negative for fatigue.   Respiratory:  Negative for cough, chest tightness and shortness of breath.    Cardiovascular:  Negative for chest pain, palpitations and leg swelling.   Gastrointestinal:  Negative for abdominal pain, constipation, diarrhea, nausea and vomiting.   Genitourinary:  Negative for difficulty urinating and  flank pain.   Musculoskeletal:  Negative for back pain.   Skin:  Negative for rash.   Neurological:  Negative for dizziness and light-headedness.        Objective                            Vitals I/O      Most Recent Min/Max in 24hrs   Temp 97.9 °F (36.6 °C) Temp  Min: 97.9 °F (36.6 °C)  Max: 98.4 °F (36.9 °C)   Pulse 92 Pulse  Min: 80  Max: 93   Resp 21 Resp  Min: 20  Max: 33   /92 BP  Min: 147/80  Max: 191/101   O2 Sat 94 % SpO2  Min: 91 %  Max: 94 %      Intake/Output Summary (Last 24 hours) at 4/9/2024 0657  Last data filed at 4/9/2024 0601  Gross per 24 hour   Intake 1128.85 ml   Output 2310 ml   Net -1181.15 ml       Diet Surgical; Clear Liquid; No Carbonation    Invasive Monitoring           Physical Exam   Physical Exam  Eyes:      Pupils: Pupils are equal, round, and reactive to light.   Skin:     General: Skin is warm.      Capillary Refill: Capillary refill takes less than 2 seconds.   HENT:      Mouth/Throat:      Mouth: Mucous membranes are dry.   Cardiovascular:      Rate and Rhythm: Normal rate and regular rhythm.      Pulses: Normal pulses.      Heart sounds: Normal heart sounds.   Musculoskeletal:      Right lower leg: No edema.      Left lower leg: No edema.   Abdominal: General: Bowel sounds are normal.      Palpations: Abdomen is soft.      Tenderness: There is no abdominal tenderness.      Comments: Abdominal incision c/d/I.   Constitutional:       Appearance: He is well-developed and well-nourished.   Pulmonary:      Effort: Pulmonary effort is normal.      Breath sounds: Normal breath sounds.   Neurological:      General: No focal deficit present.      Mental Status: He is alert and oriented to person, place and time.      Motor: Strength full and intact in all extremities.            Diagnostic Studies      EKG: Reviewed.  Imaging:  I have personally reviewed pertinent reports.       Medications:  Scheduled PRN   aspirin, 81 mg, Daily  atorvastatin, 40 mg, Daily With  Dinner  carvedilol, 6.25 mg, BID With Meals  heparin (porcine), 5,000 Units, Q8H MARIA  lidocaine, 2 patch, Daily  methocarbamol, 500 mg, Q6H MARIA  metoclopramide, 10 mg, Q6H MARIA  potassium chloride, 20 mEq, Once  tamsulosin, 0.4 mg, Daily With Dinner      acetaminophen, 650 mg, Q6H PRN  diphenhydrAMINE, 25 mg, Q6H PRN  hydrALAZINE, 10 mg, Q4H PRN  labetalol, 10 mg, Q4H PRN  metoprolol, 5 mg, Q6H PRN  oxyCODONE, 10 mg, Q4H PRN  oxyCODONE, 5 mg, Q4H PRN  sodium chloride, 1 spray, Q1H PRN       Continuous          Labs:    CBC    Recent Labs     04/08/24 0444 04/09/24  0552   WBC 16.29* 13.94*   HGB 10.6* 10.0*   HCT 31.2* 29.3*    192     BMP    Recent Labs     04/08/24 0444 04/09/24  0552   SODIUM 141 139   K 3.9 3.9   * 107   CO2 23 22   AGAP 9 10   BUN 29* 30*   CREATININE 2.25* 2.17*   CALCIUM 8.0* 7.9*       Coags    No recent results     Additional Electrolytes  Recent Labs     04/08/24 0444   CAIONIZED 1.08*          Blood Gas    No recent results  No recent results LFTs  No recent results    Infectious  No recent results  Glucose  Recent Labs     04/08/24 0444 04/09/24  0552   GLUC 96 102               Bryan Edwards

## 2024-04-09 NOTE — OCCUPATIONAL THERAPY NOTE
Occupational Therapy Progress Note     Patient Name: Sourav Saul  Today's Date: 4/9/2024  Problem List  Principal Problem:    Abdominal aortic aneurysm (AAA) (HCC)  Active Problems:    Decreased sensation         04/09/24 0918   OT Last Visit   OT Visit Date 04/09/24   Note Type   Note Type Treatment   Pain Assessment   Pain Assessment Tool 0-10   Pain Score No Pain   Restrictions/Precautions   Weight Bearing Precautions Per Order No   Other Precautions Cognitive;Chair Alarm;Bed Alarm;Fall Risk   ADL   Where Assessed Chair   Grooming Assistance 5  Supervision/Setup   Grooming Deficit Teeth care   Grooming Comments pt able to manage all aspects of grooming w/ S while seated in bedside chair, would have been able to complete while standing at sink, however supplies not in bathroom and pt then requesting seated rest break.   LB Dressing Assistance 5  Supervision/Setup   LB Dressing Deficit Thread RLE into pants;Thread LLE into pants;Pull up over hips   LB Dressing Comments pt able to complete all aspects of LB bathing w/ s while seated at EOB.   Bed Mobility   Supine to Sit 6  Modified independent   Additional items HOB elevated;Increased time required   Additional Comments found in bed, left in chair w all needs in reach and alarm on.   Transfers   Sit to Stand 6  Modified independent   Additional items Increased time required   Stand to Sit 6  Modified independent   Additional items Increased time required   Additional Comments rw   Functional Mobility   Functional Mobility 6  Modified independent   Additional Comments community+ distance, overall fair safety awareness and insight to condition during FM   Additional items Hand hold assistance   Cognition   Overall Cognitive Status WFL   Arousal/Participation Alert;Responsive   Attention Within functional limits   Orientation Level Oriented X4   Memory Decreased recall of precautions   Following Commands Follows one step commands without difficulty   Comments pt  w hxTBI as a teenager. Today, his cog appeared to be improved from previous session. Appears that this is likely his baseline level of cog function.   Activity Tolerance   Activity Tolerance Patient tolerated treatment well   Medical Staff Made Aware RN   Assessment   Assessment Pt seen on this date for OT session focusing on ADL retraining, cognitive reorientation, body mechanics, transfer retraining, increasing activity tolerance/endurance and EOB sitting to increase ability to participate in ADL/functional tasks. Pt was found in bed and was left in chair w/ all needs within reach, chair alarm on. Pt completed bed mob w S, STS and FM w mod I c rw for support. Pt completed lb dressing w s while sitting eob, and grooming tasks w/ s while sitting in chair. Pt w/ improvements in activity tolerance, transfer ability, adl task completion. Pt suffered from TBI at age 17, and his current cog function is assumed to be baseline. At this point in time, pt functioning and S/Mod I level and acute OT is no longer indicated. OT to d/c, please re-consult if needed.   Plan   Treatment Interventions ADL retraining;Functional transfer training;Endurance training;Energy conservation;Activityengagement   Goal Expiration Date 04/20/24   OT Treatment Day 1   OT Frequency 2-3x/wk   Discharge Recommendation   Rehab Resource Intensity Level, OT III (Minimum Resource Intensity)   AM-PAC Daily Activity Inpatient   Lower Body Dressing 3   Bathing 4   Toileting 4   Upper Body Dressing 4   Grooming 4   Eating 4   Daily Activity Raw Score 23   Daily Activity Standardized Score (Calc for Raw Score >=11) 51.12   AM-PAC Applied Cognition Inpatient   Following a Speech/Presentation 4   Understanding Ordinary Conversation 4   Taking Medications 3   Remembering Where Things Are Placed or Put Away 3   Remembering List of 4-5 Errands 3   Taking Care of Complicated Tasks 3   Applied Cognition Raw Score 20   Applied Cognition Standardized Score 41.76    Modified Gilboa Scale   Modified Gilboa Scale 2   End of Consult   Education Provided Yes   Patient Position at End of Consult Bedside chair;Bed/Chair alarm activated;All needs within reach   Nurse Communication Nurse aware of consult         ROSALINO Ceron, OTR/L

## 2024-04-09 NOTE — PLAN OF CARE
Problem: OCCUPATIONAL THERAPY ADULT  Goal: Performs self-care activities at highest level of function for planned discharge setting.  See evaluation for individualized goals.  Description: Treatment Interventions: ADL retraining, Visual perceptual retraining, Functional transfer training, UE strengthening/ROM, Endurance training, Cognitive reorientation, Patient/family training, Equipment evaluation/education, Compensatory technique education, Continued evaluation, Energy conservation, Activityengagement          See flowsheet documentation for full assessment, interventions and recommendations.   Outcome: Progressing  Note: Limitation: Decreased ADL status, Decreased Safe judgement during ADL, Decreased cognition, Decreased endurance, Visual deficit, Decreased self-care trans, Decreased high-level ADLs  Prognosis: Fair  Assessment: Pt seen on this date for OT session focusing on ADL retraining, cognitive reorientation, body mechanics, transfer retraining, increasing activity tolerance/endurance and EOB sitting to increase ability to participate in ADL/functional tasks. Pt was found in bed and was left in chair w/ all needs within reach, chair alarm on. Pt completed bed mob w S, STS and FM w mod I c rw for support. Pt completed lb dressing w s while sitting eob, and grooming tasks w/ s while sitting in chair. Pt w/ improvements in activity tolerance, transfer ability, adl task completion. Pt suffered from TBI at age 17, and his current cog function is assumed to be baseline. At this point in time, pt functioning and S/Mod I level and acute OT is no longer indicated. OT to d/c, please re-consult if needed.     Rehab Resource Intensity Level, OT: III (Minimum Resource Intensity)

## 2024-04-09 NOTE — CASE MANAGEMENT
Case Management Discharge Planning Note    Patient name Sourav Saul  Location [facility] 519/[facility] 519-01 MRN [id number]  : 1965 Date 2024       Current Admission Date: 2024  Current Admission Diagnosis:Abdominal aortic aneurysm (AAA) (HCC)   Patient Active Problem List    Diagnosis Date Noted    Decreased sensation 2024    Hypertension 2024    BPH (benign prostatic hyperplasia) 2024    Hyperlipidemia 2024    Iliac artery aneurysm, bilateral (HCC) 2024    CKD (chronic kidney disease) 2024    Prominent popliteal pulse 2024    Left carotid bruit 2024    Abdominal aortic aneurysm (AAA) (HCC) 02/15/2024      LOS (days): 4  Geometric Mean LOS (GMLOS) (days): 6.1  Days to GMLOS:2     OBJECTIVE:  Risk of Unplanned Readmission Score: 13.94         Current admission status: Inpatient   Preferred Pharmacy:   [id number] - [place], [place] - [address]  [address]  [place] [address]  Phone: [phone] Fax: [phone]    [facility] [place]  [place] - [address]  [address]  [place] [address]  Phone: [phone] Fax: [phone]    Primary Care Provider: Craig Rogers DO    Primary Insurance: [facility] REP  Secondary Insurance:     DISCHARGE DETAILS:                                Requested Home Health Care         Home Health Agency Name:: [facility]  HHA External Referral Reason (only applicable if external HHA name selected): Services not provided in network or near patient location         Other Referral/Resources/Interventions Provided:  Interventions: HHA  Referral Comments: [facility] accepted for SN services, provider choice given to pt's mother and both pt and mother agreeable with [facility] reserved for SN services.         Treatment Team Recommendation: Home with Home Health Care  Discharge Destination Plan:: Home with Home Health Care  Transport at Discharge : Family

## 2024-04-09 NOTE — PROGRESS NOTES
"Progress Note - Vascular Surgery   Sourav Saul 59 y.o. male MRN: [id number]  Unit/Bed#: [facility] 516-01 Encounter: [id number]    Assessment:  Sourav Saul is a 59 y.o. male hx of TBI noted to have 7.5cm AAA and B LINDA aneurysms.      4/5 - Open aortoiliac aneurysm repair with 18x9mm dacron bifurcated graft ([id number]-[id number]-[id number] repair w/ R-EIA, L-LINDA), suprarenal clamp time 23min     Plan:  Epidural removed 4/8; pain controlled  Pain recs per APS   HTN - maintain SBP<180   IV lopressor w/ hydralazine/labetalol prn   Cont carvedilol; will titrate up if remains hypertensive   IV Lopressor PRN   Encourage IS   Diet - Clears  Distended this AM; no appetite    Can adv if has BM/Flatus  CKD - baseline CKD3b   Supra-renal clamp 23 min, accessory renals off aneurysm sac without reimplantation    Trend Cr  Strict I/O   ABLA   Total transfusion 2u pRBC, 2u FFP   Trend H/H  Transfuse as neede for hgb < 7   Abx: none  Encourage OOB/ambulation   PT/OT recs   Dispo: DG med-surg    Subjective/Objective    S/E this AM, HTN overnight     No further BM, flatus. Minimal PO intake of clears     Incisional pain is well controlled.  Has been OOB to chair.  No other complaints, denies f/c, HA, CP, SOB, n/v, new paresthesias, or motor/sensory dysfunction.      Objective:     Blood pressure (!) 172/93, pulse 93, temperature 98 °F (36.7 °C), temperature source Oral, resp. rate (!) 25, height 5' 10\" (1.778 m), weight 107 kg (235 lb), SpO2 93%.,Body mass index is 33.72 kg/m².      Intake/Output Summary (Last 24 hours) at 4/9/2024 0614  Last data filed at 4/9/2024 0045  Gross per 24 hour   Intake 888.85 ml   Output 1985 ml   Net -1096.15 ml       Physical Exam:   GEN: NAD  HEENT: NCAT, R-IJ TLCVC   CV: RRR  Lung: Normal effort  Ab: Soft, NT; mild distention; midline incision C/D/I,   Extrem: 2+ fem b/l, right DP 1+, left DP 2+, motor 5/5 throughout, sensation intact, compartments soft  Neuro: A+Ox3     Lab, Imaging and other studies:I have " personally reviewed pertinent lab results.     VTE Pharmacologic Prophylaxis: Heparin  VTE Mechanical Prophylaxis: sequential compression device    Recent Results (from the past 36 hour(s))   Fingerstick Glucose (POCT)    Collection Time: 04/08/24 12:41 AM   Result Value Ref Range    POC Glucose 105 65 - 140 mg/dl   Basic metabolic panel    Collection Time: 04/08/24  4:44 AM   Result Value Ref Range    Sodium 141 135 - 147 mmol/L    Potassium 3.9 3.5 - 5.3 mmol/L    Chloride 109 (H) 96 - 108 mmol/L    CO2 23 21 - 32 mmol/L    ANION GAP 9 4 - 13 mmol/L    BUN 29 (H) 5 - 25 mg/dL    Creatinine 2.25 (H) 0.60 - 1.30 mg/dL    Glucose 96 65 - 140 mg/dL    Calcium 8.0 (L) 8.4 - 10.2 mg/dL    eGFR 30 ml/min/1.73sq m   Calcium, ionized    Collection Time: 04/08/24  4:44 AM   Result Value Ref Range    Calcium, Ionized 1.08 (L) 1.12 - 1.32 mmol/L   CBC    Collection Time: 04/08/24  4:44 AM   Result Value Ref Range    WBC 16.29 (H) 4.31 - 10.16 Thousand/uL    RBC 3.60 (L) 3.88 - 5.62 Million/uL    Hemoglobin 10.6 (L) 12.0 - 17.0 g/dL    Hematocrit 31.2 (L) 36.5 - 49.3 %    MCV 87 82 - 98 fL    MCH 29.4 26.8 - 34.3 pg    MCHC 34.0 31.4 - 37.4 g/dL    RDW 13.5 11.6 - 15.1 %    Platelets 159 149 - 390 Thousands/uL    MPV 10.4 8.9 - 12.7 fL   Fingerstick Glucose (POCT)    Collection Time: 04/08/24  6:13 AM   Result Value Ref Range    POC Glucose 95 65 - 140 mg/dl   Fingerstick Glucose (POCT)    Collection Time: 04/08/24 12:03 PM   Result Value Ref Range    POC Glucose 131 65 - 140 mg/dl   Echo complete w/ contrast if indicated    Collection Time: 04/08/24  2:18 PM   Result Value Ref Range    BSA 2.24 m2    A4C EF 46 %    LV Diastolic Volume (BP) 122 mL    LV Systolic Volume (BP) 64 mL    EF 48 %    LVIDd 4.40 cm    LVIDS 2.80 cm    IVSd 1.50 cm    LVPWd 1.40 cm    FS 36 28 - 44    MV E' Tissue Velocity Septal 8 cm/s    MV E' Tissue Velocity Lateral 10 cm/s    LA Volume Index (BP) 16.1 mL/m2    E/A ratio 0.78     E wave deceleration  time 193 ms    MV Peak E Domingo 83 cm/s    MV Peak A Domingo 1.06 m/s    RVID d 4.4 cm    Tricuspid annular plane systolic excursion 2.00 cm    LA size 2.9 cm    LA length (A2C) 4.80 cm    LA volume (BP) 36 mL    RAA A4C 12.5 cm2    MV stenosis pressure 1/2 time 56 ms    MV valve area p 1/2 method 3.93     Ao root 3.50 cm    Left ventricular stroke volume (2D) 56.00 mL    IVS 1.5 cm    LEFT VENTRICLE SYSTOLIC VOLUME (MOD BIPLANE) 2D 31 mL    LV DIASTOLIC VOLUME (MOD BIPLANE) 2D 87 mL    Left Atrium Area-systolic Four Chamber 13.6 cm2    Left Atrium Area-systolic Apical Two Chamber 14.6 cm2    LVSV, 2D 56 mL    LV Diastolic Volume Index (BP) 54.5 mL/m2    LV Systolic Volume Index (BP) 28.6 mL/m2    LV EF 50    Fingerstick Glucose (POCT)    Collection Time: 04/08/24  4:52 PM   Result Value Ref Range    POC Glucose 100 65 - 140 mg/dl   Fingerstick Glucose (POCT)    Collection Time: 04/08/24  9:12 PM   Result Value Ref Range    POC Glucose 100 65 - 140 mg/dl

## 2024-04-09 NOTE — PROGRESS NOTES
Vascular Nurse Navigator Post Op Education    Met with patient at bedside to introduce myself as Vascular Nurse Navigator and explained my role.  Patient is appropriate and accepting to education. Patient was educated with Review of written materials provided, Teachback, Explanation, Demonstration, and Question & Answer on expectations of post op care and recovery on Open aortoiliac aneurysm repair with 18x9mm dacron bifurcated graft ([id number]-rw-rayxq repair w/ R-EIA, L-LINDA). Patient is a former smoker, quit 1 month ago, as such Smoking effects on the lungs, tobacco triggers, and Smoking cessation was reviewed. Education provided to patient on infection prevention, activity limitations, when to call the office, importance of follow up, and incisional care.  Discharge instruction handout provided to patient to review.  Provided patient with a pack of disposable washcloths, a pack of guaze, and a bottle of chlorhexidine for incision care upon discharge.

## 2024-04-09 NOTE — QUICK NOTE
Vascular Surgery Quick Note    Mr. Saul and his mother were seen at bedside to provide updates as requested. We discussed Mr. Saul's general progress following surgery in addition to plans to advance diet as tolerated and to continue close monitoring of patient's blood pressure. Continued to encourage incentive spirometry and ambulation. Discussed that outpatient follow-up has been arranged. Mr. Saul and his mother were appreciative of the update and their questions/concerns were addressed.      Merry Marsh PA-C

## 2024-04-09 NOTE — CASE MANAGEMENT
Case Management Discharge Planning Note    Patient name Sourav Saul  Location [facility] 519/[facility] 519-01 MRN [id number]  : 1965 Date 2024       Current Admission Date: 2024  Current Admission Diagnosis:Abdominal aortic aneurysm (AAA) (HCC)   Patient Active Problem List    Diagnosis Date Noted    Decreased sensation 2024    Hypertension 2024    BPH (benign prostatic hyperplasia) 2024    Hyperlipidemia 2024    Iliac artery aneurysm, bilateral (HCC) 2024    CKD (chronic kidney disease) 2024    Prominent popliteal pulse 2024    Left carotid bruit 2024    Abdominal aortic aneurysm (AAA) (HCC) 02/15/2024      LOS (days): 4  Geometric Mean LOS (GMLOS) (days): 6.1  Days to GMLOS:2.1     OBJECTIVE:  Risk of Unplanned Readmission Score: 14.05         Current admission status: Inpatient   Preferred Pharmacy:   [id number] - [place], [place] - [address]  [address]  [place] [address]  Phone: [phone] Fax: [phone]    [facility] [place]  [place] - [address]  [address]  [place] [address]  Phone: [phone] Fax: [phone]    Primary Care Provider: Craig Rogers DO    [facility]  Secondary Insurance:     DISCHARGE DETAILS:    Discharge planning discussed with:: Mother Leatha  Freedom of Choice: Yes  Comments - Freedom of Choice: Mother Leatha requesting VNA at dc to home. No preference to agency., discussed FOC. Agreeable to [place] referrals.  CM contacted family/caregiver?: Yes (mother Leatha at bedside)  Were Treatment Team discharge recommendations reviewed with patient/caregiver?: Yes  Did patient/caregiver verbalize understanding of patient care needs?: Yes  Were patient/caregiver advised of the risks associated with not following Treatment Team discharge recommendations?: Yes    Contacts  Patient Contacts: Mother  Leatha  Relationship to Patient:: Family  Contact Method: In Person  Reason/Outcome: Discharge Planning, Continuity of Care    Requested Home Health Care         Is the patient interested in HHC at discharge?: Yes  Home Health Discipline requested:: Nursing  Home Health Follow-Up Provider:: PCP  Home Health Services Needed:: Post-Op Care and Assessment  Homebound Criteria Met:: Uses an Assist Device (i.e. cane, walker, etc)  Supporting Clincal Findings:: Fatigues Easliy in Short Distances, Limited Endurance                   Additional Comments: Received TT from nursing staff mother Leatha wanted to s/w CM. Mother expressed the desire to have VNA come once pt dc to home to assess incisions and f/u with pt compliance. She states she has many stairs in the home and wants to make sure pt is able to go up and down the stairs safely. made her aware of no rehab needs when evaluated by PT. Per staff pt is ambulating to the bathroom with RW. per Physical therapy pt did 14 stairs today and PT is signing off. Mother also requesting she receive an update from AP regarding how pateint is doing as he has difficulty relaying any information due to a TBI at age 17. Pt lives with her but has been independent with cooking his meals and ADL's. Pt collects SSD and is not employed due to his TBI. Mother Leatha also requesting Diet info so she can make pt meals. TT Vascular AP provider to come and speak with pt;s mother. TT Nutrition regarding diet info as requested by mother, pt currently on Full liquid diet, rec nutrition consult be made so they can follow and give diet info prior to dc home. Updated mother Deirdre with all of the above. Referrals placed on [facility] will follow up.

## 2024-04-09 NOTE — PROGRESS NOTES
NEPHROLOGY PROGRESS NOTE   Sourav Saul 59 y.o. male MRN: [id number]  Unit/Bed#: [facility] 516-01 Encounter: [id number]      ASSESSMENT & PLAN:  #1 RAQUEL on top of suspected CKD, noted creatinine in the low 2s in February 2024  Patient was admitted with a creatinine of 2.15, increased to 2.62 on 4/6.  Kidney function is slowly improving with creatinine down to 2.17 this morning.  Continue with current regimen, avoid relative hypotension, keep systolic blood pressure over 120s.  Monitor ins and outs and follow daily labs    #2 elective open orthoiliac aneurysm repair of 7.5 AAA with bilateral LINDA aneurysm on 4/5.  Postoperative management as per vascular surgery    #3 hypertension, blood pressure is still elevated, increase carvedilol to 12.5 mg twice a day.  Continue with hydralazine and metoprolol IV as needed.  Add low sodium to his diet.  If blood pressure remains elevated consider add nifedipine    #4 mild anemia, hemoglobin low but stable hemoglobin 10.0 this morning, recommend blood transfusion for hemoglobin less than 7    5 prior lactic acidosis postsurgery, improved with IV fluid resuscitation, prior lactic acid cleared on 4/6      SUBJECTIVE:  Seen and examined, denies any current complaints, eating breakfast, denies any chest pain, no shortness of breath, postoperative pain well-controlled, no headaches, no nausea or vomiting    OBJECTIVE:  Current Weight: Weight - Scale: 107 kg (235 lb)  Vitals:    04/09/24 0812   BP: 162/88   Pulse: 95   Resp:    Temp:    SpO2:        Intake/Output Summary (Last 24 hours) at 4/9/2024 0852  Last data filed at 4/9/2024 0601  Gross per 24 hour   Intake 1090 ml   Output 2310 ml   Net -1220 ml       General: conscious, cooperative, in not acute distress  Eyes: conjunctivae pink, anicteric sclerae  ENT: lips and mucous membranes moist  Neck: supple, no JVD  Chest: clear breath sounds bilateral, no crackles, ronchus or wheezings  CVS: distinct S1 & S2, normal rate, regular  rhythm  Abdomen: soft, non-tender, distended, normoactive bowel sounds  Extremities: no significant edema of both legs  Skin: no rash  Neuro: awake, alert, oriented        Medications:    Current Facility-Administered Medications:     acetaminophen (TYLENOL) tablet 650 mg, 650 mg, Oral, Q6H PRN, Kim Auguste PA-C    aspirin (ECOTRIN LOW STRENGTH) EC tablet 81 mg, 81 mg, Oral, Daily, Patrick Lewis DO, 81 mg at 04/09/24 0812    atorvastatin (LIPITOR) tablet 40 mg, 40 mg, Oral, Daily With Dinner, Patrick Lewis DO, 40 mg at 04/08/24 1611    carvedilol (COREG) tablet 12.5 mg, 12.5 mg, Oral, BID With Meals, Terrence Shelby MD    diphenhydrAMINE (BENADRYL) injection 25 mg, 25 mg, Intravenous, Q6H PRN, Sunny Ferraro DO    heparin (porcine) subcutaneous injection 5,000 Units, 5,000 Units, Subcutaneous, Q8H MARIA, 5,000 Units at 04/09/24 0553 **AND** [CANCELED] Platelet count, , , Once, Sunny Ferraro DO    hydrALAZINE (APRESOLINE) injection 10 mg, 10 mg, Intravenous, Q4H PRN, Maik Gold PA-C    labetalol (NORMODYNE) injection 10 mg, 10 mg, Intravenous, Q4H PRN, Maik Gold PA-C, 10 mg at 04/08/24 1611    lidocaine (LIDODERM) 5 % patch 2 patch, 2 patch, Topical, Daily, Tavia Anne PA-C, 2 patch at 04/07/24 0854    methocarbamol (ROBAXIN) tablet 500 mg, 500 mg, Oral, Q6H MARIA, Armond Diaz MD, 500 mg at 04/09/24 0553    metoclopramide (REGLAN) injection 10 mg, 10 mg, Intravenous, Q6H MARIA, Tavia Anne PA-C, 10 mg at 04/09/24 0553    metoprolol (LOPRESSOR) injection 5 mg, 5 mg, Intravenous, Q6H PRN, Tavia Anne PA-C    oxyCODONE (ROXICODONE) immediate release tablet 10 mg, 10 mg, Oral, Q4H PRN, Armond Diaz MD    oxyCODONE (ROXICODONE) IR tablet 5 mg, 5 mg, Oral, Q4H PRN, Armond Diaz MD    sodium chloride (OCEAN) 0.65 % nasal spray 1 spray, 1 spray, Each Nare, Q1H PRN, Maik Gold PA-C    tamsulosin (FLOMAX) capsule 0.4 mg, 0.4 mg, Oral, Daily  "With Dinner, Kim Auguste PA-C    Invasive Devices:        Lab Results:   Results from last 7 days   Lab Units 04/09/24  0552 04/08/24  0444 04/07/24  0513 04/06/24  0441 04/05/24  1511 04/05/24  1511 04/05/24  1430 04/05/24  1328 04/05/24  1228   WBC Thousand/uL 13.94* 16.29* 19.09* 18.03*   < > 21.74*  --   --   --    HEMOGLOBIN g/dL 10.0* 10.6* 10.8* 11.1*   < > 12.2  --   --   --    I STAT HEMOGLOBIN g/dl  --   --   --   --   --   --  10.2* 8.8* 8.5*   HEMATOCRIT % 29.3* 31.2* 32.0* 33.1*   < > 35.7*  --   --   --    HEMATOCRIT, ISTAT %  --   --   --   --   --   --  30* 26* 25*   PLATELETS Thousands/uL 192 159 144* 167   < > 171  --   --   --    SODIUM mmol/L 139 141 142 143   < > 142  --   --   --    POTASSIUM mmol/L 3.9 3.9 3.9 4.4   < > 5.0  --   --   --    CHLORIDE mmol/L 107 109* 108 110*   < > 113*  --   --   --    CO2 mmol/L 22 23 24 22   < > 18*  --   --   --    CO2, I-STAT mmol/L  --   --   --   --   --   --  21 21 21   BUN mg/dL 30* 29* 32* 34*   < > 24  --   --   --    CREATININE mg/dL 2.17* 2.25* 2.61* 2.62*   < > 2.15*  --   --   --    CALCIUM mg/dL 7.9* 8.0* 7.8* 7.6*   < > 8.0*  --   --   --    MAGNESIUM mg/dL  --   --  2.0 1.8*  --  1.3*  --   --   --    PHOSPHORUS mg/dL  --   --   --  3.7  --  4.1  --   --   --    GLUCOSE, ISTAT mg/dl  --   --   --   --   --   --  141* 145* 131    < > = values in this interval not displayed.       Previous work up:  See previous notes      Portions of the record may have been created with voice recognition software. Occasional wrong word or \"sound a like\" substitutions may have occurred due to the inherent limitations of voice recognition software. Read the chart carefully and recognize, using context, where substitutions have occurred.If you have any questions, please contact the dictating provider.  "

## 2024-04-10 LAB
ANION GAP SERPL CALCULATED.3IONS-SCNC: 10 MMOL/L (ref 4–13)
BUN SERPL-MCNC: 34 MG/DL (ref 5–25)
CALCIUM SERPL-MCNC: 7.7 MG/DL (ref 8.4–10.2)
CHLORIDE SERPL-SCNC: 108 MMOL/L (ref 96–108)
CO2 SERPL-SCNC: 22 MMOL/L (ref 21–32)
CREAT SERPL-MCNC: 2.25 MG/DL (ref 0.6–1.3)
GFR SERPL CREATININE-BSD FRML MDRD: 30 ML/MIN/1.73SQ M
GLUCOSE SERPL-MCNC: 103 MG/DL (ref 65–140)
GLUCOSE SERPL-MCNC: 112 MG/DL (ref 65–140)
GLUCOSE SERPL-MCNC: 116 MG/DL (ref 65–140)
GLUCOSE SERPL-MCNC: 122 MG/DL (ref 65–140)
GLUCOSE SERPL-MCNC: 91 MG/DL (ref 65–140)
POTASSIUM SERPL-SCNC: 3.8 MMOL/L (ref 3.5–5.3)
SODIUM SERPL-SCNC: 140 MMOL/L (ref 135–147)

## 2024-04-10 PROCEDURE — 80048 BASIC METABOLIC PNL TOTAL CA: CPT

## 2024-04-10 PROCEDURE — 82948 REAGENT STRIP/BLOOD GLUCOSE: CPT

## 2024-04-10 RX ORDER — CARVEDILOL 12.5 MG/1
12.5 TABLET ORAL ONCE
Status: COMPLETED | OUTPATIENT
Start: 2024-04-10 | End: 2024-04-10

## 2024-04-10 RX ORDER — CARVEDILOL 25 MG/1
25 TABLET ORAL 2 TIMES DAILY WITH MEALS
Status: DISCONTINUED | OUTPATIENT
Start: 2024-04-10 | End: 2024-04-11 | Stop reason: HOSPADM

## 2024-04-10 RX ADMIN — TAMSULOSIN HYDROCHLORIDE 0.4 MG: 0.4 CAPSULE ORAL at 17:00

## 2024-04-10 RX ADMIN — HEPARIN SODIUM 5000 UNITS: 5000 INJECTION INTRAVENOUS; SUBCUTANEOUS at 14:16

## 2024-04-10 RX ADMIN — METHOCARBAMOL 500 MG: 500 TABLET ORAL at 17:00

## 2024-04-10 RX ADMIN — ATORVASTATIN CALCIUM 40 MG: 40 TABLET, FILM COATED ORAL at 17:00

## 2024-04-10 RX ADMIN — CARVEDILOL 12.5 MG: 12.5 TABLET, FILM COATED ORAL at 08:41

## 2024-04-10 RX ADMIN — METOCLOPRAMIDE HYDROCHLORIDE 10 MG: 5 INJECTION INTRAMUSCULAR; INTRAVENOUS at 05:18

## 2024-04-10 RX ADMIN — CARVEDILOL 12.5 MG: 12.5 TABLET, FILM COATED ORAL at 10:30

## 2024-04-10 RX ADMIN — METHOCARBAMOL 500 MG: 500 TABLET ORAL at 00:05

## 2024-04-10 RX ADMIN — HEPARIN SODIUM 5000 UNITS: 5000 INJECTION INTRAVENOUS; SUBCUTANEOUS at 05:18

## 2024-04-10 RX ADMIN — METHOCARBAMOL 500 MG: 500 TABLET ORAL at 12:41

## 2024-04-10 RX ADMIN — METHOCARBAMOL 500 MG: 500 TABLET ORAL at 23:05

## 2024-04-10 RX ADMIN — ASPIRIN 81 MG: 81 TABLET, COATED ORAL at 08:41

## 2024-04-10 RX ADMIN — METOCLOPRAMIDE HYDROCHLORIDE 10 MG: 5 INJECTION INTRAMUSCULAR; INTRAVENOUS at 00:05

## 2024-04-10 RX ADMIN — CARVEDILOL 25 MG: 25 TABLET, FILM COATED ORAL at 17:00

## 2024-04-10 RX ADMIN — HEPARIN SODIUM 5000 UNITS: 5000 INJECTION INTRAVENOUS; SUBCUTANEOUS at 22:01

## 2024-04-10 RX ADMIN — METHOCARBAMOL 500 MG: 500 TABLET ORAL at 05:18

## 2024-04-10 NOTE — PROGRESS NOTES
"Progress Note - Vascular Surgery   Sourav Saul 59 y.o. male MRN: [id number]  Unit/Bed#: [facility] 519-01 Encounter: [id number]    Assessment:  Sourav Saul is a 59 y.o. male hx of TBI noted to have 7.5cm AAA and B LINDA aneurysms.      4/5 - Open aortoiliac aneurysm repair with 18x9mm dacron bifurcated graft (xbxyi-lf-etitc repair w/ R-EIA, L-LINDA), suprarenal clamp time 23min     Plan:  Epidural removed 4/8; pain controlled  Pain recs per APS   HTN - Controlled overnight   maintain SBP<180   IV lopressor w/ hydralazine/labetalol prn   Cont carvedilol  IV Lopressor PRN   Encourage IS   Diet - reg  CKD - baseline CKD3b   Trend Cr  Strict I/O   ABLA   Total transfusion 2u pRBC, 2u FFP   Trend H/H  Transfuse as neede for hgb < 7   Abx: none  Encourage OOB/ambulation   PT/OT recs; no post-acute rehab needs  DC home     Subjective/Objective    S/E this AM    Amira diet. Admits to flatus, no BM overnight.    Incisional pain is well controlled.  Has been OOB to chair.  No other complaints, denies f/c, HA, CP, SOB, n/v, new paresthesias, or motor/sensory dysfunction.      Objective:     Blood pressure 158/96, pulse 86, temperature 99.6 °F (37.6 °C), temperature source Oral, resp. rate 18, height 5' 10\" (1.778 m), weight 107 kg (235 lb), SpO2 95%.,Body mass index is 33.72 kg/m².      Intake/Output Summary (Last 24 hours) at 4/10/2024 0617  Last data filed at 4/10/2024 0000  Gross per 24 hour   Intake 960 ml   Output 800 ml   Net 160 ml       Physical Exam:   GEN: NAD  HEENT: NCAT, R-IJ TLCVC   CV: RRR  Lung: Normal effort  Ab: Soft, NT; ND; midline incision C/D/I,   Extrem: 2+ fem b/l, right DP 1+, left DP 2+, motor 5/5 throughout, sensation intact, compartments soft  Neuro: A+Ox3     Lab, Imaging and other studies:I have personally reviewed pertinent lab results.     VTE Pharmacologic Prophylaxis: Heparin  VTE Mechanical Prophylaxis: sequential compression device    Recent Results (from the past 36 hour(s))   Fingerstick " Glucose (POCT)    Collection Time: 04/08/24  9:12 PM   Result Value Ref Range    POC Glucose 100 65 - 140 mg/dl   CBC and Platelet    Collection Time: 04/09/24  5:52 AM   Result Value Ref Range    WBC 13.94 (H) 4.31 - 10.16 Thousand/uL    RBC 3.41 (L) 3.88 - 5.62 Million/uL    Hemoglobin 10.0 (L) 12.0 - 17.0 g/dL    Hematocrit 29.3 (L) 36.5 - 49.3 %    MCV 86 82 - 98 fL    MCH 29.3 26.8 - 34.3 pg    MCHC 34.1 31.4 - 37.4 g/dL    RDW 13.3 11.6 - 15.1 %    Platelets 192 149 - 390 Thousands/uL    MPV 10.2 8.9 - 12.7 fL   Basic metabolic panel    Collection Time: 04/09/24  5:52 AM   Result Value Ref Range    Sodium 139 135 - 147 mmol/L    Potassium 3.9 3.5 - 5.3 mmol/L    Chloride 107 96 - 108 mmol/L    CO2 22 21 - 32 mmol/L    ANION GAP 10 4 - 13 mmol/L    BUN 30 (H) 5 - 25 mg/dL    Creatinine 2.17 (H) 0.60 - 1.30 mg/dL    Glucose 102 65 - 140 mg/dL    Calcium 7.9 (L) 8.4 - 10.2 mg/dL    eGFR 32 ml/min/1.73sq m   Fingerstick Glucose (POCT)    Collection Time: 04/09/24  8:11 AM   Result Value Ref Range    POC Glucose 98 65 - 140 mg/dl   Fingerstick Glucose (POCT)    Collection Time: 04/09/24 12:23 PM   Result Value Ref Range    POC Glucose 165 (H) 65 - 140 mg/dl   Fingerstick Glucose (POCT)    Collection Time: 04/09/24  3:45 PM   Result Value Ref Range    POC Glucose 100 65 - 140 mg/dl   Fingerstick Glucose (POCT)    Collection Time: 04/09/24  8:57 PM   Result Value Ref Range    POC Glucose 102 65 - 140 mg/dl   Fingerstick Glucose (POCT)    Collection Time: 04/10/24  6:03 AM   Result Value Ref Range    POC Glucose 103 65 - 140 mg/dl

## 2024-04-10 NOTE — PLAN OF CARE
Problem: PAIN - ADULT  Goal: Verbalizes/displays adequate comfort level or baseline comfort level  Description: Interventions:  - Encourage patient to monitor pain and request assistance  - Assess pain using appropriate pain scale  - Administer analgesics based on type and severity of pain and evaluate response  - Implement non-pharmacological measures as appropriate and evaluate response  - Consider cultural and social influences on pain and pain management  - Notify physician/advanced practitioner if interventions unsuccessful or patient reports new pain  Outcome: Progressing     Problem: INFECTION - ADULT  Goal: Absence or prevention of progression during hospitalization  Description: INTERVENTIONS:  - Assess and monitor for signs and symptoms of infection  - Monitor lab/diagnostic results  - Monitor all insertion sites, i.e. indwelling lines, tubes, and drains  - Monitor endotracheal if appropriate and nasal secretions for changes in amount and color  - [place] appropriate cooling/warming therapies per order  - Administer medications as ordered  - Instruct and encourage patient and family to use good hand hygiene technique  - Identify and instruct in appropriate isolation precautions for identified infection/condition  Outcome: Progressing     Problem: SAFETY ADULT  Goal: Patient will remain free of falls  Description: INTERVENTIONS:  - Educate patient/family on patient safety including physical limitations  - Instruct patient to call for assistance with activity   - Consult OT/PT to assist with strengthening/mobility   - Keep Call bell within reach  - Keep bed low and locked with side rails adjusted as appropriate  - Keep care items and personal belongings within reach  - Initiate and maintain comfort rounds  - Make Fall Risk Sign visible to staff  - Offer Toileting every 2 Hours, in advance of need  - Initiate/Maintain bed alarm  - Obtain necessary fall risk management equipment:   Problem: DISCHARGE  PLANNING  Goal: Discharge to home or other facility with appropriate resources  Description: INTERVENTIONS:  - Identify barriers to discharge w/patient and caregiver  - Arrange for needed discharge resources and transportation as appropriate  - Identify discharge learning needs (meds, wound care, etc.)  - Arrange for interpretive services to assist at discharge as needed  - Refer to Case Management Department for coordinating discharge planning if the patient needs post-hospital services based on physician/advanced practitioner order or complex needs related to functional status, cognitive ability, or social support system  Outcome: Progressing   - Apply yellow socks and bracelet for high fall risk patients  - Consider moving patient to room near nurses station  Outcome: Progressing     Problem: Knowledge Deficit  Goal: Patient/family/caregiver demonstrates understanding of disease process, treatment plan, medications, and discharge instructions  Description: Complete learning assessment and assess knowledge base.  Interventions:  - Provide teaching at level of understanding  - Provide teaching via preferred learning methods  Outcome: Progressing

## 2024-04-10 NOTE — PROGRESS NOTES
NEPHROLOGY PROGRESS NOTE   Sourav Saul 59 y.o. male MRN: [id number]  Unit/Bed#: [facility] 519-01 Encounter: [id number]      ASSESSMENT & PLAN:  #1 RAQUEL on top of suspected CKD, noted creatinine in the low 2s in February 2024  Patient was admitted with a creatinine of 2.15, increased to 2.62 on 4/6.  Kidney function is stable with a creatinine of 2.25 this morning  Continue with current regimen, avoid relative hypotension, keep systolic blood pressure over 120s.  Monitor ins and outs and follow daily labs.  Patient will need outpatient follow-up with nephrology in our [place] office    #2 elective open orthoiliac aneurysm repair of 7.5 AAA with bilateral LINDA aneurysm on 4/5.  Operative management as per vascular surgery    #3 hypertension, blood pressure remains elevated.  Plan to increase carvedilol to 25 mg twice a day.  Continue with hydralazine and metoprolol IV as needed  Follow low-sodium diet.  Blood pressure remains elevated consider add nifedipine    #4 mild anemia, hemoglobin low but stable hemoglobin 10.0 on 4/9, recommend blood transfusion for hemoglobin less than 7        SUBJECTIVE:  Seen and examined, feeling okay, denies significant complaints other than some postoperative discomfort, tolerating diet, no chest pain, no shortness of breath, no nausea or vomiting    OBJECTIVE:  Current Weight: Weight - Scale: 107 kg (235 lb)  Vitals:    04/10/24 0701   BP: 160/96   Pulse: 86   Resp: 20   Temp: 99 °F (37.2 °C)   SpO2: 93%       Intake/Output Summary (Last 24 hours) at 4/10/2024 0928  Last data filed at 4/10/2024 0840  Gross per 24 hour   Intake 1290 ml   Output 775 ml   Net 515 ml       General: conscious, cooperative, in not acute distress  Eyes: conjunctivae pink, anicteric sclerae  ENT: lips and mucous membranes moist  Neck: supple, no JVD  Chest: clear breath sounds bilateral, no crackles, ronchus or wheezings  CVS: distinct S1 & S2, normal rate, regular rhythm  Abdomen: soft, mildly-tender,  non-distended, normoactive bowel sounds  Extremities: no edema of both legs  Skin: no rash  Neuro: awake, alert, oriented        Medications:    Current Facility-Administered Medications:     acetaminophen (TYLENOL) tablet 650 mg, 650 mg, Oral, Q6H PRN, Alia Eddy PA-C    aspirin (ECOTRIN LOW STRENGTH) EC tablet 81 mg, 81 mg, Oral, Daily, Alia Eddy PA-C, 81 mg at 04/10/24 0841    atorvastatin (LIPITOR) tablet 40 mg, 40 mg, Oral, Daily With Dinner, Alia Eddy PA-C, 40 mg at 04/09/24 1719    carvedilol (COREG) tablet 12.5 mg, 12.5 mg, Oral, Once, Terrence Shelby MD    carvedilol (COREG) tablet 25 mg, 25 mg, Oral, BID With Meals, Terrence Shelby MD    diphenhydrAMINE (BENADRYL) injection 25 mg, 25 mg, Intravenous, Q6H PRN, Alia Eddy PA-C    heparin (porcine) subcutaneous injection 5,000 Units, 5,000 Units, Subcutaneous, Q8H MARIA, 5,000 Units at 04/10/24 0518 **AND** [CANCELED] Platelet count, , , Once, Sunny Ferraro,     hydrALAZINE (APRESOLINE) injection 10 mg, 10 mg, Intravenous, Q4H PRN, Alia Eddy PA-C    labetalol (NORMODYNE) injection 10 mg, 10 mg, Intravenous, Q4H PRN, Alia Eddy PA-C, 10 mg at 04/08/24 1611    lidocaine (LIDODERM) 5 % patch 2 patch, 2 patch, Topical, Daily, Alia Eddy PA-C, 2 patch at 04/07/24 0854    methocarbamol (ROBAXIN) tablet 500 mg, 500 mg, Oral, Q6H MARIA, Alia Eddy PA-C, 500 mg at 04/10/24 0518    metoprolol (LOPRESSOR) injection 5 mg, 5 mg, Intravenous, Q6H PRN, Alia Eddy PA-C    oxyCODONE (ROXICODONE) immediate release tablet 10 mg, 10 mg, Oral, Q4H PRN, Alia Eddy PA-C    oxyCODONE (ROXICODONE) IR tablet 5 mg, 5 mg, Oral, Q4H PRN, Alia Eddy PA-C    sodium chloride (OCEAN) 0.65 % nasal spray 1 spray, 1 spray, Each Nare, Q1H PRN, Alia Eddy PA-C    tamsulosin (FLOMAX) capsule 0.4 mg, 0.4 mg, Oral, Daily With Dinner, Kim Auguste PA-C, 0.4 mg at 04/09/24  "1719    Invasive Devices:        Lab Results:   Results from last 7 days   Lab Units 04/10/24  0452 04/09/24  0552 04/08/24  0444 04/07/24  0513 04/06/24  0441 04/05/24  1511 04/05/24  1511 04/05/24  1430 04/05/24  1328 04/05/24  1228   WBC Thousand/uL  --  13.94* 16.29* 19.09* 18.03*   < > 21.74*  --   --   --    HEMOGLOBIN g/dL  --  10.0* 10.6* 10.8* 11.1*   < > 12.2  --   --   --    I STAT HEMOGLOBIN g/dl  --   --   --   --   --   --   --  10.2* 8.8* 8.5*   HEMATOCRIT %  --  29.3* 31.2* 32.0* 33.1*   < > 35.7*  --   --   --    HEMATOCRIT, ISTAT %  --   --   --   --   --   --   --  30* 26* 25*   PLATELETS Thousands/uL  --  192 159 144* 167   < > 171  --   --   --    SODIUM mmol/L 140 139 141 142 143   < > 142  --   --   --    POTASSIUM mmol/L 3.8 3.9 3.9 3.9 4.4   < > 5.0  --   --   --    CHLORIDE mmol/L 108 107 109* 108 110*   < > 113*  --   --   --    CO2 mmol/L 22 22 23 24 22   < > 18*  --   --   --    CO2, I-STAT mmol/L  --   --   --   --   --   --   --  21 21 21   BUN mg/dL 34* 30* 29* 32* 34*   < > 24  --   --   --    CREATININE mg/dL 2.25* 2.17* 2.25* 2.61* 2.62*   < > 2.15*  --   --   --    CALCIUM mg/dL 7.7* 7.9* 8.0* 7.8* 7.6*   < > 8.0*  --   --   --    MAGNESIUM mg/dL  --   --   --  2.0 1.8*  --  1.3*  --   --   --    PHOSPHORUS mg/dL  --   --   --   --  3.7  --  4.1  --   --   --    GLUCOSE, ISTAT mg/dl  --   --   --   --   --   --   --  141* 145* 131    < > = values in this interval not displayed.       Previous work up:  See previous notes      Portions of the record may have been created with voice recognition software. Occasional wrong word or \"sound a like\" substitutions may have occurred due to the inherent limitations of voice recognition software. Read the chart carefully and recognize, using context, where substitutions have occurred.If you have any questions, please contact the dictating provider.  "

## 2024-04-10 NOTE — PLAN OF CARE
Problem: PAIN - ADULT  Goal: Verbalizes/displays adequate comfort level or baseline comfort level  Description: Interventions:  - Encourage patient to monitor pain and request assistance  - Assess pain using appropriate pain scale  - Administer analgesics based on type and severity of pain and evaluate response  - Implement non-pharmacological measures as appropriate and evaluate response  - Consider cultural and social influences on pain and pain management  - Notify physician/advanced practitioner if interventions unsuccessful or patient reports new pain  Outcome: Progressing     Problem: INFECTION - ADULT  Goal: Absence or prevention of progression during hospitalization  Description: INTERVENTIONS:  - Assess and monitor for signs and symptoms of infection  - Monitor lab/diagnostic results  - Monitor all insertion sites, i.e. indwelling lines, tubes, and drains  - Monitor endotracheal if appropriate and nasal secretions for changes in amount and color  - [place] appropriate cooling/warming therapies per order  - Administer medications as ordered  - Instruct and encourage patient and family to use good hand hygiene technique  - Identify and instruct in appropriate isolation precautions for identified infection/condition  Outcome: Progressing  Goal: Absence of fever/infection during neutropenic period  Description: INTERVENTIONS:  - Monitor WBC    Outcome: Progressing     Problem: DISCHARGE PLANNING  Goal: Discharge to home or other facility with appropriate resources  Description: INTERVENTIONS:  - Identify barriers to discharge w/patient and caregiver  - Arrange for needed discharge resources and transportation as appropriate  - Identify discharge learning needs (meds, wound care, etc.)  - Arrange for interpretive services to assist at discharge as needed  - Refer to Case Management Department for coordinating discharge planning if the patient needs post-hospital services based on physician/advanced  practitioner order or complex needs related to functional status, cognitive ability, or social support system  Outcome: Progressing     Problem: Knowledge Deficit  Goal: Patient/family/caregiver demonstrates understanding of disease process, treatment plan, medications, and discharge instructions  Description: Complete learning assessment and assess knowledge base.  Interventions:  - Provide teaching at level of understanding  - Provide teaching via preferred learning methods  Outcome: Progressing

## 2024-04-11 ENCOUNTER — TELEPHONE (OUTPATIENT)
Dept: NEPHROLOGY | Facility: CLINIC | Age: 59
End: 2024-04-11

## 2024-04-11 VITALS
RESPIRATION RATE: 18 BRPM | SYSTOLIC BLOOD PRESSURE: 150 MMHG | OXYGEN SATURATION: 94 % | DIASTOLIC BLOOD PRESSURE: 86 MMHG | WEIGHT: 235 LBS | TEMPERATURE: 99.1 F | BODY MASS INDEX: 33.64 KG/M2 | HEIGHT: 70 IN | HEART RATE: 82 BPM

## 2024-04-11 DIAGNOSIS — N18.32 STAGE 3B CHRONIC KIDNEY DISEASE (HCC): Primary | ICD-10-CM

## 2024-04-11 DIAGNOSIS — I10 PRIMARY HYPERTENSION: ICD-10-CM

## 2024-04-11 DIAGNOSIS — N17.9 ACUTE RENAL FAILURE, UNSPECIFIED ACUTE RENAL FAILURE TYPE (HCC): ICD-10-CM

## 2024-04-11 PROBLEM — R20.8 DECREASED SENSATION: Status: RESOLVED | Noted: 2024-04-06 | Resolved: 2024-04-11

## 2024-04-11 LAB
ANION GAP SERPL CALCULATED.3IONS-SCNC: 10 MMOL/L (ref 4–13)
BUN SERPL-MCNC: 35 MG/DL (ref 5–25)
CALCIUM SERPL-MCNC: 7.8 MG/DL (ref 8.4–10.2)
CHLORIDE SERPL-SCNC: 108 MMOL/L (ref 96–108)
CO2 SERPL-SCNC: 20 MMOL/L (ref 21–32)
CREAT SERPL-MCNC: 2.19 MG/DL (ref 0.6–1.3)
ERYTHROCYTE [DISTWIDTH] IN BLOOD BY AUTOMATED COUNT: 13.1 % (ref 11.6–15.1)
GFR SERPL CREATININE-BSD FRML MDRD: 31 ML/MIN/1.73SQ M
GLUCOSE SERPL-MCNC: 108 MG/DL (ref 65–140)
GLUCOSE SERPL-MCNC: 99 MG/DL (ref 65–140)
HCT VFR BLD AUTO: 31.3 % (ref 36.5–49.3)
HGB BLD-MCNC: 9.8 G/DL (ref 12–17)
MCH RBC QN AUTO: 28.2 PG (ref 26.8–34.3)
MCHC RBC AUTO-ENTMCNC: 31.3 G/DL (ref 31.4–37.4)
MCV RBC AUTO: 90 FL (ref 82–98)
PLATELET # BLD AUTO: 136 THOUSANDS/UL (ref 149–390)
PMV BLD AUTO: 11.7 FL (ref 8.9–12.7)
POTASSIUM SERPL-SCNC: 3.9 MMOL/L (ref 3.5–5.3)
RBC # BLD AUTO: 3.48 MILLION/UL (ref 3.88–5.62)
SODIUM SERPL-SCNC: 138 MMOL/L (ref 135–147)
WBC # BLD AUTO: 14.86 THOUSAND/UL (ref 4.31–10.16)

## 2024-04-11 PROCEDURE — 85027 COMPLETE CBC AUTOMATED: CPT | Performed by: PHYSICIAN ASSISTANT

## 2024-04-11 PROCEDURE — 80048 BASIC METABOLIC PNL TOTAL CA: CPT | Performed by: PHYSICIAN ASSISTANT

## 2024-04-11 PROCEDURE — 82948 REAGENT STRIP/BLOOD GLUCOSE: CPT

## 2024-04-11 RX ORDER — ACETAMINOPHEN 325 MG/1
650 TABLET ORAL EVERY 6 HOURS PRN
COMMUNITY
Start: 2024-04-11

## 2024-04-11 RX ORDER — METHOCARBAMOL 500 MG/1
500 TABLET, FILM COATED ORAL EVERY 6 HOURS SCHEDULED
Qty: 30 TABLET | Refills: 0 | Status: SHIPPED | OUTPATIENT
Start: 2024-04-11

## 2024-04-11 RX ORDER — CARVEDILOL 25 MG/1
25 TABLET ORAL 2 TIMES DAILY WITH MEALS
Qty: 30 TABLET | Refills: 3 | Status: SHIPPED | OUTPATIENT
Start: 2024-04-11

## 2024-04-11 RX ORDER — OXYCODONE HYDROCHLORIDE 5 MG/1
5 TABLET ORAL EVERY 6 HOURS PRN
Qty: 20 TABLET | Refills: 0 | Status: SHIPPED | OUTPATIENT
Start: 2024-04-11 | End: 2024-04-21

## 2024-04-11 RX ADMIN — ASPIRIN 81 MG: 81 TABLET, COATED ORAL at 08:48

## 2024-04-11 RX ADMIN — HEPARIN SODIUM 5000 UNITS: 5000 INJECTION INTRAVENOUS; SUBCUTANEOUS at 05:38

## 2024-04-11 RX ADMIN — CARVEDILOL 25 MG: 25 TABLET, FILM COATED ORAL at 08:48

## 2024-04-11 RX ADMIN — METHOCARBAMOL 500 MG: 500 TABLET ORAL at 05:38
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
## 2024-04-11 NOTE — PLAN OF CARE
Problem: PAIN - ADULT  Goal: Verbalizes/displays adequate comfort level or baseline comfort level  Description: Interventions:  - Encourage patient to monitor pain and request assistance  - Assess pain using appropriate pain scale  - Administer analgesics based on type and severity of pain and evaluate response  - Implement non-pharmacological measures as appropriate and evaluate response  - Consider cultural and social influences on pain and pain management  - Notify physician/advanced practitioner if interventions unsuccessful or patient reports new pain  Outcome: Progressing     Problem: INFECTION - ADULT  Goal: Absence or prevention of progression during hospitalization  Description: INTERVENTIONS:  - Assess and monitor for signs and symptoms of infection  - Monitor lab/diagnostic results  - Monitor all insertion sites, i.e. indwelling lines, tubes, and drains  - Monitor endotracheal if appropriate and nasal secretions for changes in amount and color  - [place] appropriate cooling/warming therapies per order  - Administer medications as ordered  - Instruct and encourage patient and family to use good hand hygiene technique  - Identify and instruct in appropriate isolation precautions for identified infection/condition  Outcome: Progressing     Problem: SAFETY ADULT  Goal: Patient will remain free of falls  Description: INTERVENTIONS:  - Educate patient/family on patient safety including physical limitations  - Instruct patient to call for assistance with activity   - Consult OT/PT to assist with strengthening/mobility   - Keep Call bell within reach  - Keep bed low and locked with side rails adjusted as appropriate  - Keep care items and personal belongings within reach  - Initiate and maintain comfort rounds  - Make Fall Risk Sign visible to staff  - Offer Toileting every  Hours, in advance of need  - Initiate/Maintain alarm  - Obtain necessary fall risk management equipment:   - Apply yellow socks and  bracelet for high fall risk patients  - Consider moving patient to room near nurses station  Outcome: Progressing  Goal: Maintain or return to baseline ADL function  Description: INTERVENTIONS:  -  Assess patient's ability to carry out ADLs; assess patient's baseline for ADL function and identify physical deficits which impact ability to perform ADLs (bathing, care of mouth/teeth, toileting, grooming, dressing, etc.)  - Assess/evaluate cause of self-care deficits   - Assess range of motion  - Assess patient's mobility; develop plan if impaired  - Assess patient's need for assistive devices and provide as appropriate  - Encourage maximum independence but intervene and supervise when necessary  - Involve family in performance of ADLs  - Assess for home care needs following discharge   - Consider OT consult to assist with ADL evaluation and planning for discharge  - Provide patient education as appropriate  Outcome: Progressing  Goal: Maintains/Returns to pre admission functional level  Description: INTERVENTIONS:  - Perform AM-PAC 6 Click Basic Mobility/ Daily Activity assessment daily.  - Set and communicate daily mobility goal to care team and patient/family/caregiver.   - Collaborate with rehabilitation services on mobility goals if consulted  - Perform Range of Motion  times a day.  - Reposition patient every  hours.  - Dangle patient  times a day  - Stand patient  times a day  - Ambulate patient  times a day  - Out of bed to chair  times a day   - Out of bed for meals  times a day  - Out of bed for toileting  - Record patient progress and toleration of activity level   Outcome: Progressing     Problem: DISCHARGE PLANNING  Goal: Discharge to home or other facility with appropriate resources  Description: INTERVENTIONS:  - Identify barriers to discharge w/patient and caregiver  - Arrange for needed discharge resources and transportation as appropriate  - Identify discharge learning needs (meds, wound care, etc.)  -  Arrange for interpretive services to assist at discharge as needed  - Refer to Case Management Department for coordinating discharge planning if the patient needs post-hospital services based on physician/advanced practitioner order or complex needs related to functional status, cognitive ability, or social support system  Outcome: Progressing     Problem: Knowledge Deficit  Goal: Patient/family/caregiver demonstrates understanding of disease process, treatment plan, medications, and discharge instructions  Description: Complete learning assessment and assess knowledge base.  Interventions:  - Provide teaching at level of understanding  - Provide teaching via preferred learning methods  Outcome: Progressing
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
## 2024-04-11 NOTE — PROGRESS NOTES
NEPHROLOGY PROGRESS NOTE   Sourav Saul 59 y.o. male MRN: [id number]  Unit/Bed#: [facility] 519-01 Encounter: [id number]      ASSESSMENT & PLAN:  #1 RAQUEL on top of suspected CKD, noted creatinine in the low 2s in February 2024  Patient was admitted with a creatinine of 2.15, increased to 2.62 on 4/6.  Kidney function improving with creatinine down to 2.19 this morning.  Stable from kidney standpoint of view to be discharged.  Continue with low-salt diet, avoid NSAIDs.  Will contact our office for hospital follow-up in our [place] office.    #2 elective open orthoiliac aneurysm repair of 7.5 AAA with bilateral LINAD aneurysm on 4/5.  Postoperative management as per vascular surgery    #3 hypertension, blood pressure elevated but overall improving.  Carvedilol was increased to 25 mg p.o. twice daily yesterday  Discussed with patient about importance to follow low-salt diet    #4 mild anemia, hemoglobin low but is stable hemoglobin down to 9.8 this morning        SUBJECTIVE:  Seen and examined, no complaints, feeling well, reports he is going home today.  Denies any chest pain, no shortness of breath, abdominal pain    OBJECTIVE:  Current Weight: Weight - Scale: 107 kg (235 lb)  Vitals:    04/11/24 0702   BP: 150/86   Pulse: 82   Resp: 18   Temp: 99.1 °F (37.3 °C)   SpO2: 94%       Intake/Output Summary (Last 24 hours) at 4/11/2024 1233  Last data filed at 4/11/2024 1202  Gross per 24 hour   Intake 1320 ml   Output 750 ml   Net 570 ml       General: conscious, cooperative, in not acute distress  Eyes: conjunctivae pink, anicteric sclerae  ENT: lips and mucous membranes moist  Neck: supple, no JVD  Chest: clear breath sounds bilateral, no crackles, ronchus or wheezings  CVS: distinct S1 & S2, normal rate, regular rhythm  Abdomen: soft, non-tender, non-distended, normoactive bowel sounds  Extremities: no edema of both legs  Skin: no rash  Neuro: awake, alert, oriented        Medications:    Current Facility-Administered  Medications:     acetaminophen (TYLENOL) tablet 650 mg, 650 mg, Oral, Q6H PRN, Alia Eddy PA-C    aspirin (ECOTRIN LOW STRENGTH) EC tablet 81 mg, 81 mg, Oral, Daily, Alia Eddy PA-C, 81 mg at 04/11/24 0848    atorvastatin (LIPITOR) tablet 40 mg, 40 mg, Oral, Daily With Dinner, Alia Eddy PA-C, 40 mg at 04/10/24 1700    carvedilol (COREG) tablet 25 mg, 25 mg, Oral, BID With Meals, Terrence Shelby MD, 25 mg at 04/11/24 0848    diphenhydrAMINE (BENADRYL) injection 25 mg, 25 mg, Intravenous, Q6H PRN, Alia Eddy PA-C    heparin (porcine) subcutaneous injection 5,000 Units, 5,000 Units, Subcutaneous, Q8H MARIA, 5,000 Units at 04/11/24 0538 **AND** [CANCELED] Platelet count, , , Once, Sunny Ferraro,     hydrALAZINE (APRESOLINE) injection 10 mg, 10 mg, Intravenous, Q4H PRN, Alia Eddy PA-C    labetalol (NORMODYNE) injection 10 mg, 10 mg, Intravenous, Q4H PRN, Alia Eddy PA-C, 10 mg at 04/08/24 1611    lidocaine (LIDODERM) 5 % patch 2 patch, 2 patch, Topical, Daily, Alia Eddy PA-C, 2 patch at 04/07/24 0854    methocarbamol (ROBAXIN) tablet 500 mg, 500 mg, Oral, Q6H MARIA, Alia Eddy PA-C, 500 mg at 04/11/24 0538    metoprolol (LOPRESSOR) injection 5 mg, 5 mg, Intravenous, Q6H PRN, Alia Eddy PA-C    oxyCODONE (ROXICODONE) immediate release tablet 10 mg, 10 mg, Oral, Q4H PRN, Alia Eddy PA-C    oxyCODONE (ROXICODONE) IR tablet 5 mg, 5 mg, Oral, Q4H PRN, Alia Eddy PA-C    sodium chloride (OCEAN) 0.65 % nasal spray 1 spray, 1 spray, Each Nare, Q1H PRN, Alia Eddy PA-C    tamsulosin (FLOMAX) capsule 0.4 mg, 0.4 mg, Oral, Daily With Dinner, Kim Auguste PA-C, 0.4 mg at 04/10/24 1700    Invasive Devices:        Lab Results:   Results from last 7 days   Lab Units 04/11/24  0502 04/10/24  0452 04/09/24  0552 04/08/24  0444 04/07/24  0513 04/06/24  0441 04/05/24  1511 04/05/24  1511 04/05/24  1430 04/05/24  1328  "04/05/24  1228   WBC Thousand/uL 14.86*  --  13.94* 16.29* 19.09* 18.03*   < > 21.74*  --   --   --    HEMOGLOBIN g/dL 9.8*  --  10.0* 10.6* 10.8* 11.1*   < > 12.2  --   --   --    I STAT HEMOGLOBIN g/dl  --   --   --   --   --   --   --   --  10.2* 8.8* 8.5*   HEMATOCRIT % 31.3*  --  29.3* 31.2* 32.0* 33.1*   < > 35.7*  --   --   --    HEMATOCRIT, ISTAT %  --   --   --   --   --   --   --   --  30* 26* 25*   PLATELETS Thousands/uL 136*  --  192 159 144* 167   < > 171  --   --   --    SODIUM mmol/L 138 140 139 141 142 143   < > 142  --   --   --    POTASSIUM mmol/L 3.9 3.8 3.9 3.9 3.9 4.4   < > 5.0  --   --   --    CHLORIDE mmol/L 108 108 107 109* 108 110*   < > 113*  --   --   --    CO2 mmol/L 20* 22 22 23 24 22   < > 18*  --   --   --    CO2, I-STAT mmol/L  --   --   --   --   --   --   --   --  21 21 21   BUN mg/dL 35* 34* 30* 29* 32* 34*   < > 24  --   --   --    CREATININE mg/dL 2.19* 2.25* 2.17* 2.25* 2.61* 2.62*   < > 2.15*  --   --   --    CALCIUM mg/dL 7.8* 7.7* 7.9* 8.0* 7.8* 7.6*   < > 8.0*  --   --   --    MAGNESIUM mg/dL  --   --   --   --  2.0 1.8*  --  1.3*  --   --   --    PHOSPHORUS mg/dL  --   --   --   --   --  3.7  --  4.1  --   --   --    GLUCOSE, ISTAT mg/dl  --   --   --   --   --   --   --   --  141* 145* 131    < > = values in this interval not displayed.       Previous work up:  See previous notes      Portions of the record may have been created with voice recognition software. Occasional wrong word or \"sound a like\" substitutions may have occurred due to the inherent limitations of voice recognition software. Read the chart carefully and recognize, using context, where substitutions have occurred.If you have any questions, please contact the dictating provider.  "

## 2024-04-11 NOTE — TELEPHONE ENCOUNTER
----- Message from Terrence Shelby MD sent at 4/11/2024 12:36 PM EDT -----  Hospitalization with RAQUEL on suspected CKD.  Please arrange hospital follow-up in our [place] office in 2 to 3 weeks with first available AP or physician with repeat labs (please order CBC, renal function panel, PTH and UPC).  Thanks

## 2024-04-11 NOTE — CASE MANAGEMENT
Case Management Discharge Note    Patient name Sourav Saul  Location [facility] 519/[facility] 519-01 MRN [id number]  : 1965 Date 2024       Current Admission Date: 2024  Current Admission Diagnosis:Abdominal aortic aneurysm (AAA) (HCC)      LOS (days): 6  Geometric Mean LOS (GMLOS) (days): 6.1  Days to GMLOS:0.3     OBJECTIVE:  Risk of Unplanned Readmission Score: 13.99   Current admission status: Inpatient   Primary Insurance: [employer] [facility]    DISCHARGE DETAILS:  Discharge planning discussed with:: Patient  Freedom of Choice: Yes  Were Treatment Team discharge recommendations reviewed with patient/caregiver?: Yes  Did patient/caregiver verbalize understanding of patient care needs?: Yes  Were patient/caregiver advised of the risks associated with not following Treatment Team discharge recommendations?: Yes    Requested Home Health Care         Is the patient interested in HHC at discharge?: Yes  Home Health Discipline requested:: Nursing  [facility]  A External Referral Reason (only applicable if external HHA name selected): Services not provided in network or near patient location  Home Health Follow-Up Provider:: PCP  Home Health Services Needed:: Post-Op Care and Assessment  Homebound Criteria Met:: Requires the Assistance of Another Person for Safe Ambulation or to Leave the Home  Supporting Clincal Findings:: Limited Endurance    Treatment Team Recommendation: Home with Home Health Care  Discharge Destination Plan:: Home with Home Health Care  Transport at Discharge : Family    Additional Comments: Pt is cleared for d/c by Vascular Surgery ALEM Moreno. STUART Velez was notified of pt's d/c order. Pt is accepted for services by [facility] for his aftercare plan. The pt and his family were informed of d/c. Family will transport pt home later this day; pickup time is TBD. IMM signed by pt. No further CM needs.

## 2024-04-12 NOTE — UTILIZATION REVIEW
NOTIFICATION OF ADMISSION DISCHARGE   This is a Notification of Discharge from [facility]. Please be advised that this patient has been discharge from our facility. Below you will find the admission and discharge date and time including the patient’s disposition.   UTILIZATION REVIEW CONTACT:  Cristo Morales  Utilization   Network Utilization Review Department  Phone: [phone] x carefully listen to the prompts. All voicemails are confidential.  Email: [email or web address]     ADMISSION INFORMATION  PRESENTATION DATE: 4/5/2024  5:22 AM  OBERVATION ADMISSION DATE:   INPATIENT ADMISSION DATE: 4/5/24  2:42 PM   DISCHARGE DATE: 4/11/2024  1:29 PM   DISPOSITION:Home with Home Health Care    Network Utilization Review Department  ATTENTION: Please call with any questions or concerns to [phone] and carefully listen to the prompts so that you are directed to the right person. All voicemails are confidential.   For Discharge needs, contact Care Management DC Support Team at [phone] opt. 2  Send all requests for admission clinical reviews, approved or denied determinations and any other requests to dedicated fax number below belonging to the campus where the patient is receiving treatment. List of dedicated fax numbers for the Facilities:  FACILITY NAME UR FAX NUMBER   ADMISSION DENIALS (Administrative/Medical Necessity) [phone]   DISCHARGE SUPPORT TEAM ([facility]) [phone]   PARENT CHILD HEALTH (Maternity/NICU/Pediatrics) [phone]   General acute hospital [phone]   [facility] [phone]   [facility] [phone]   [facility] [phone]   [facility] [phone]   [facility] [phone]   [facility] [phone]   [facility]  [phone]   [facility] [phone]   Counts include 234 beds at the [facility] [phone]   [facility] [phone]   [facility] [phone]

## 2024-04-15 ENCOUNTER — OFFICE VISIT (OUTPATIENT)
Dept: VASCULAR SURGERY | Facility: CLINIC | Age: 59
End: 2024-04-15

## 2024-04-15 VITALS
TEMPERATURE: 99.2 F | SYSTOLIC BLOOD PRESSURE: 94 MMHG | WEIGHT: 189.4 LBS | HEART RATE: 70 BPM | DIASTOLIC BLOOD PRESSURE: 60 MMHG | BODY MASS INDEX: 27.11 KG/M2 | OXYGEN SATURATION: 96 % | HEIGHT: 70 IN

## 2024-04-15 DIAGNOSIS — I71.43 INFRARENAL ABDOMINAL AORTIC ANEURYSM (AAA) WITHOUT RUPTURE (HCC): Primary | ICD-10-CM

## 2024-04-15 DIAGNOSIS — I72.3 ILIAC ARTERY ANEURYSM, BILATERAL (HCC): ICD-10-CM

## 2024-04-15 DIAGNOSIS — R09.89 LEFT CAROTID BRUIT: ICD-10-CM

## 2024-04-15 DIAGNOSIS — R09.89 PROMINENT POPLITEAL PULSE: ICD-10-CM

## 2024-04-15 DIAGNOSIS — E78.2 MIXED HYPERLIPIDEMIA: ICD-10-CM

## 2024-04-15 DIAGNOSIS — N18.32 STAGE 3B CHRONIC KIDNEY DISEASE (HCC): ICD-10-CM

## 2024-04-15 PROCEDURE — 99024 POSTOP FOLLOW-UP VISIT: CPT | Performed by: SURGERY
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
## 2024-04-15 NOTE — ANESTHESIA PROCEDURE NOTES
Anesthesia Notable Event    Date/Time: 4/5/2024 6:52 PM    Performed by: Sunny Gastelum MD  Authorized by: Elsa Winston MD    Post-Op Assessment Note     CV Status:  Stable  Pain Score: 0    Pain management: adequate       Post-op block assessment: secured with tape   Mental Status:  Alert and awake   Hydration Status:  Euvolemic   PONV Controlled:  Controlled   Airway Patency:  Patent  Two or more mitigation strategies used for obstructive sleep apnea   Post Op Vitals Reviewed: Yes    Anethesia notable event occurred.     Staff: Anesthesiologist      Reason for prolonged intubation > 24 hours:  N/aReason for prolonged intubation > 48 hours:  N/a     Nursing called for patient having new right sided sensory changes. I went to bedside and evaluated this patient for possible epidural involvement. Epidural was assessed and showed no tenderness to palpation with clean and dry dressing in place. No bleeding noted on exam. Patient was noted to have 5/5 strength in all extremities and had some minor sensory changes that he related over part of the L5/S1 distribution that changes with multiple exams. The epidural had been stopped prior to my evaluation and he was feeling very comfortable. Discussion with patient an family, they felt strongly that getting neurology involved with a stat consult would make them feel better. Neurology was stat consulted and oncall resident messaged/called through [employer]. Neurology by beside. Answered all questions.

## 2024-04-15 NOTE — PATIENT INSTRUCTIONS
1) Constipation  -please start a stool softener (like Senna - S); you can take this twice daily until you are regular again    2) Diet  -eat small meals more frequently until your appetite comes back    3) Wound care  -shower daily and wash incision gently with soap and water  -you can get direct water on the incision

## 2024-04-29 ENCOUNTER — OFFICE VISIT (OUTPATIENT)
Dept: VASCULAR SURGERY | Facility: CLINIC | Age: 59
End: 2024-04-29

## 2024-04-29 ENCOUNTER — TELEPHONE (OUTPATIENT)
Dept: NEPHROLOGY | Facility: CLINIC | Age: 59
End: 2024-04-29

## 2024-04-29 VITALS
DIASTOLIC BLOOD PRESSURE: 98 MMHG | HEART RATE: 68 BPM | BODY MASS INDEX: 26.92 KG/M2 | SYSTOLIC BLOOD PRESSURE: 128 MMHG | WEIGHT: 188 LBS | HEIGHT: 70 IN | OXYGEN SATURATION: 97 %

## 2024-04-29 DIAGNOSIS — I71.41 PARARENAL ABDOMINAL AORTIC ANEURYSM (AAA) WITHOUT RUPTURE (HCC): Primary | ICD-10-CM

## 2024-04-29 PROCEDURE — 99024 POSTOP FOLLOW-UP VISIT: CPT | Performed by: SURGERY

## 2024-04-29 NOTE — PATIENT INSTRUCTIONS
Mr. Saul is 3 weeks status post open AAA.  Presented with a 7.1 cm AAA.  He is doing well from a postoperative standpoint he does have some moderate tiredness however that appears to be improving.  Staples were removed today his incision wound is clean and dry Steri-Strips were placed.  He has imaging AAA carotid and peripheral arterial in mid June and will follow-up with Dr. Unique Wilson In July.    Plan: Follow-up imaging as noted above and office visit in July.

## 2024-04-29 NOTE — PROGRESS NOTES
Assessment/Plan:    Mr. aSul is 3 weeks status post open AAA.  Presented with a 7.1 cm AAA.  He is doing well from a postoperative standpoint he does have some moderate tiredness however that appears to be improving.  Staples were removed today his incision wound is clean and dry Steri-Strips were placed.  He has imaging AAA carotid and peripheral arterial in mid June and will follow-up with Dr. Unique Wilson In July.    Plan: Follow-up imaging as noted above and office visit in July.       Problem List Items Addressed This Visit          Cardiovascular and Mediastinum    Abdominal aortic aneurysm (AAA) (HCC) - Primary     Mr. Saul is 3 weeks status post open AAA.  Presented with a 7.1 cm AAA.  He is doing well from a postoperative standpoint he does have some moderate tiredness however that appears to be improving.  Staples were removed today his incision wound is clean and dry Steri-Strips were placed.  He has imaging AAA carotid and peripheral arterial in mid June and will follow-up with Dr. Unique Wilson In July.    Plan: Follow-up imaging as noted above and office visit in July.              Subjective:      Patient ID: Sourav Saul is a 59 y.o. male.    Patient is s/p Open aorta and iliac aneurysm repair done 4/5/24 by Dr. Unique Wilson. He presents for removal of remaining staples. He denies fever, chills, or pain. He is taking ASA 81 mg and Atorvastatin.    HPI    The following portions of the patient's history were reviewed and updated as appropriate: allergies, current medications, past family history, past medical history, past social history, past surgical history, and problem list.    Review of Systems   Constitutional: Negative.    HENT: Negative.     Eyes: Negative.    Respiratory: Negative.     Cardiovascular: Negative.    Gastrointestinal: Negative.    Endocrine: Negative.    Genitourinary: Negative.    Musculoskeletal: Negative.    Skin: Negative.    Allergic/Immunologic: Negative.   "  Neurological: Negative.    Hematological: Negative.    Psychiatric/Behavioral: Negative.           Objective:      There were no vitals taken for this visit.         Physical Exam          Oriented x3 no evidence of clinical depression.    Eyes:  Sclera non-icteric    Skin: normal without evidence of inflammation    Neck is supple carotid pulses equal bilaterally no bruits heard    Chest lungs clear, heart regular rhythm.    Abdomen soft nontender no evidence of pulsatile masses.,  Excellent wound healing status post open AAA repair.  Staples removed Steri-Strips placed    Pulses are palpable bilateral Femoral  Popliteal, DP and PT.    Neurological exam intact cranial nerves 2-12 grossly intact no gross motor sensory deficits detected.      Imaging viewed and reviewed with Patient    I have reviewed and made appropriate changes to the review of systems input by the medical assistant.    Vitals:    04/29/24 1058   BP: 128/98   BP Location: Left arm   Patient Position: Sitting   Cuff Size: Standard   Pulse: 68   SpO2: 97%   Weight: 85.3 kg (188 lb)   Height: 5' 10\" (1.778 m)       Patient Active Problem List   Diagnosis    Abdominal aortic aneurysm (AAA) (HCC)    Hypertension    BPH (benign prostatic hyperplasia)    Hyperlipidemia    Iliac artery aneurysm, bilateral (HCC)    CKD (chronic kidney disease)    Prominent popliteal pulse    Left carotid bruit       Past Surgical History:   Procedure Laterality Date    COLONOSCOPY      OK DIR RPR ANEURYSM ABDOMINAL AORTA N/A 4/5/2024    Procedure: Open aorta and iliac aneurysm repair;  Surgeon: Unique Wilson MD;  Location: [facility];  Service: Vascular       Family History   Problem Relation Age of Onset    Hypertension Father     Heart attack Father     Hypertension Mother        Social History     Socioeconomic History    Marital status: Single     Spouse name: Not on file    Number of children: Not on file    Years of education: Not on file    Highest education " level: Not on file   Occupational History    Not on file   Tobacco Use    Smoking status: Former     Current packs/day: 0.50     Types: Cigarettes    Smokeless tobacco: Current    Tobacco comments:     Quit smoking 3/5   Vaping Use    Vaping status: Never Used   Substance and Sexual Activity    Alcohol use: Not Currently    Drug use: Not Currently    Sexual activity: Not on file   Other Topics Concern    Not on file   Social History Narrative    Not on file     Social Determinants of Health     Financial Resource Strain: Not on file   Food Insecurity: No Food Insecurity (4/7/2024)    Hunger Vital Sign     Worried About Running Out of Food in the Last Year: Never true     Ran Out of Food in the Last Year: Never true   Transportation Needs: No Transportation Needs (4/7/2024)    PRAPARE - Transportation     Lack of Transportation (Medical): No     Lack of Transportation (Non-Medical): No   Physical Activity: Not on file   Stress: Not on file   Social Connections: Not on file   Intimate Partner Violence: Not on file   Housing Stability: Low Risk  (4/7/2024)    Housing Stability Vital Sign     Unable to Pay for Housing in the Last Year: No     Number of Places Lived in the Last Year: 1     Unstable Housing in the Last Year: No       No Known Allergies      Current Outpatient Medications:     acetaminophen (TYLENOL) 325 mg tablet, Take 2 tablets (650 mg total) by mouth every 6 (six) hours as needed for mild pain, Disp: , Rfl:     aspirin 81 mg chewable tablet, Chew 1 tablet (81 mg total) daily, Disp: , Rfl:     atorvastatin (LIPITOR) 40 mg tablet, Take 1 tablet (40 mg total) by mouth daily, Disp: 90 tablet, Rfl: 4    carvedilol (COREG) 25 mg tablet, Take 1 tablet (25 mg total) by mouth 2 (two) times a day with meals, Disp: 30 tablet, Rfl: 3    Multiple Vitamin (Multivitamin Adult) TABS, Take by mouth, Disp: , Rfl:     tamsulosin (FLOMAX) 0.4 mg, Take 2 capsules (0.8 mg total) by mouth daily with dinner, Disp: 180 capsule,  Rfl: 3    Vascepa 1 g CAPS, Every 12 hours, Disp: , Rfl:     methocarbamol (ROBAXIN) 500 mg tablet, Take 1 tablet (500 mg total) by mouth every 6 (six) hours (Patient not taking: Reported on 4/29/2024), Disp: 30 tablet, Rfl: 0

## 2024-05-01 ENCOUNTER — APPOINTMENT (OUTPATIENT)
Dept: LAB | Facility: HOSPITAL | Age: 59
End: 2024-05-01
Payer: COMMERCIAL

## 2024-05-01 DIAGNOSIS — N18.32 STAGE 3B CHRONIC KIDNEY DISEASE (HCC): ICD-10-CM

## 2024-05-01 DIAGNOSIS — N17.9 ACUTE RENAL FAILURE, UNSPECIFIED ACUTE RENAL FAILURE TYPE (HCC): ICD-10-CM

## 2024-05-01 DIAGNOSIS — I10 PRIMARY HYPERTENSION: ICD-10-CM

## 2024-05-01 LAB
ALBUMIN SERPL BCP-MCNC: 3.9 G/DL (ref 3.5–5)
ANION GAP SERPL CALCULATED.3IONS-SCNC: 8 MMOL/L (ref 4–13)
BUN SERPL-MCNC: 25 MG/DL (ref 5–25)
CALCIUM SERPL-MCNC: 9.5 MG/DL (ref 8.4–10.2)
CHLORIDE SERPL-SCNC: 103 MMOL/L (ref 96–108)
CO2 SERPL-SCNC: 29 MMOL/L (ref 21–32)
CREAT SERPL-MCNC: 2.12 MG/DL (ref 0.6–1.3)
CREAT UR-MCNC: 219 MG/DL
ERYTHROCYTE [DISTWIDTH] IN BLOOD BY AUTOMATED COUNT: 13 % (ref 11.6–15.1)
GFR SERPL CREATININE-BSD FRML MDRD: 33 ML/MIN/1.73SQ M
GLUCOSE SERPL-MCNC: 103 MG/DL (ref 65–140)
HCT VFR BLD AUTO: 36.8 % (ref 36.5–49.3)
HGB BLD-MCNC: 11.5 G/DL (ref 12–17)
MCH RBC QN AUTO: 27.6 PG (ref 26.8–34.3)
MCHC RBC AUTO-ENTMCNC: 31.3 G/DL (ref 31.4–37.4)
MCV RBC AUTO: 88 FL (ref 82–98)
PHOSPHATE SERPL-MCNC: 3.8 MG/DL (ref 2.7–4.5)
PLATELET # BLD AUTO: 229 THOUSANDS/UL (ref 149–390)
PMV BLD AUTO: 10.5 FL (ref 8.9–12.7)
POTASSIUM SERPL-SCNC: 4.5 MMOL/L (ref 3.5–5.3)
PROT UR-MCNC: 20 MG/DL
PROT/CREAT UR: 0.09 MG/G{CREAT} (ref 0–0.1)
PTH-INTACT SERPL-MCNC: 16.3 PG/ML (ref 12–88)
RBC # BLD AUTO: 4.17 MILLION/UL (ref 3.88–5.62)
SODIUM SERPL-SCNC: 140 MMOL/L (ref 135–147)
WBC # BLD AUTO: 10.57 THOUSAND/UL (ref 4.31–10.16)

## 2024-05-01 PROCEDURE — 83970 ASSAY OF PARATHORMONE: CPT

## 2024-05-01 PROCEDURE — 85027 COMPLETE CBC AUTOMATED: CPT

## 2024-05-01 PROCEDURE — 84156 ASSAY OF PROTEIN URINE: CPT

## 2024-05-01 PROCEDURE — 82570 ASSAY OF URINE CREATININE: CPT

## 2024-05-01 PROCEDURE — 80069 RENAL FUNCTION PANEL: CPT

## 2024-05-01 PROCEDURE — 36415 COLL VENOUS BLD VENIPUNCTURE: CPT

## 2024-05-07 ENCOUNTER — APPOINTMENT (OUTPATIENT)
Dept: LAB | Facility: HOSPITAL | Age: 59
End: 2024-05-07
Payer: COMMERCIAL

## 2024-05-07 ENCOUNTER — OFFICE VISIT (OUTPATIENT)
Dept: NEPHROLOGY | Facility: HOSPITAL | Age: 59
End: 2024-05-07
Attending: SURGERY
Payer: COMMERCIAL

## 2024-05-07 VITALS
SYSTOLIC BLOOD PRESSURE: 110 MMHG | DIASTOLIC BLOOD PRESSURE: 64 MMHG | HEART RATE: 62 BPM | BODY MASS INDEX: 27.06 KG/M2 | HEIGHT: 70 IN | WEIGHT: 189 LBS

## 2024-05-07 DIAGNOSIS — R31.29 OTHER MICROSCOPIC HEMATURIA: ICD-10-CM

## 2024-05-07 DIAGNOSIS — I10 PRIMARY HYPERTENSION: ICD-10-CM

## 2024-05-07 DIAGNOSIS — D64.9 ANEMIA, UNSPECIFIED TYPE: ICD-10-CM

## 2024-05-07 DIAGNOSIS — N18.32 STAGE 3B CHRONIC KIDNEY DISEASE (HCC): ICD-10-CM

## 2024-05-07 DIAGNOSIS — I71.40 ABDOMINAL AORTIC ANEURYSM (AAA), UNSPECIFIED PART, UNSPECIFIED WHETHER RUPTURED (HCC): ICD-10-CM

## 2024-05-07 DIAGNOSIS — N18.32 STAGE 3B CHRONIC KIDNEY DISEASE (HCC): Primary | ICD-10-CM

## 2024-05-07 LAB
BACTERIA UR QL AUTO: ABNORMAL /HPF
BILIRUB UR QL STRIP: NEGATIVE
CLARITY UR: CLEAR
COLOR UR: YELLOW
GLUCOSE UR STRIP-MCNC: NEGATIVE MG/DL
HGB UR QL STRIP.AUTO: NEGATIVE
HYALINE CASTS #/AREA URNS LPF: ABNORMAL /LPF
KETONES UR STRIP-MCNC: NEGATIVE MG/DL
LEUKOCYTE ESTERASE UR QL STRIP: NEGATIVE
NITRITE UR QL STRIP: NEGATIVE
NON-SQ EPI CELLS URNS QL MICRO: ABNORMAL /HPF
PH UR STRIP.AUTO: 5.5 [PH]
PROT UR STRIP-MCNC: ABNORMAL MG/DL
RBC #/AREA URNS AUTO: ABNORMAL /HPF
SP GR UR STRIP.AUTO: 1.02 (ref 1–1.03)
UROBILINOGEN UR STRIP-ACNC: <2 MG/DL
WBC #/AREA URNS AUTO: ABNORMAL /HPF

## 2024-05-07 PROCEDURE — 99214 OFFICE O/P EST MOD 30 MIN: CPT | Performed by: INTERNAL MEDICINE

## 2024-05-07 PROCEDURE — 81001 URINALYSIS AUTO W/SCOPE: CPT

## 2024-05-07 NOTE — PROGRESS NOTES
NEPHROLOGY OUTPATIENT PROGRESS NOTE   Sourav Saul 59 y.o. male MRN: [id number]  DATE: 5/7/2024  Reason for visit:   Chief Complaint   Patient presents with    Chronic Kidney Disease    Follow-up        Patient Instructions   CKD stage 3b in setting of hypertensive nephrosclerosis - suspect baseline sCr low 2s, with last sCr 2.12 as of 5/1/24  -UA with microscopy showed small blood, trace protein, 4-10 RBCs, without bacteria or protein  -repeat UA with microscopy  -no significant proteinuria noted  -renal u/s showed normal kidneys as of Feb. 2024.  -repeat BMP in 3 months  -CKD education booklet provided in office   -stay well hydrated  -follow low sodium diet  -refer to kidney nutrition class  Mild anemia - Hgb 11.5, monitor CBC post op  HTN - BP low normal, continue coreg 25mg twice daily, flomax  -avoid high salt/sodium diet  -avoid caffeine  7.6cm distal AAA s/p repair 4/5/24 - continue vascular follow up, BP acceptable     Obtain blood and urine testing prior to next office visit. RTC in 4 months.        Ed was seen today for chronic kidney disease and follow-up.    Diagnoses and all orders for this visit:    Stage 3b chronic kidney disease (HCC)  -     Ambulatory Referral to Nephrology  -     Basic metabolic panel; Future  -     Urinalysis with microscopic; Future  -     Protein / creatinine ratio, urine; Future  -     Albumin / creatinine urine ratio; Future  -     CBC and differential; Future  -     Ambulatory Referral to Nutrition Services; Future    Abdominal aortic aneurysm (AAA), unspecified part, unspecified whether ruptured (HCC)  -     Ambulatory Referral to Nephrology    Primary hypertension    Anemia, unspecified type  -     CBC and differential; Future        Assessment/Plan:  CKD stage 3b in setting of hypertensive nephrosclerosis - suspect baseline sCr low 2s, with last sCr 2.12 as of 5/1/24  -UA with microscopy showed small blood, trace protein, 4-10 RBCs, without bacteria or protein  -repeat  "UA with microscopy  -no significant proteinuria noted  -renal u/s showed normal kidneys as of Feb. 2024.  -repeat BMP in 3 months  -CKD education booklet provided in office   -stay well hydrated  -follow low sodium diet  -refer to kidney nutrition class  Mild anemia - Hgb 11.5, monitor CBC post op  HTN - BP low normal, continue coreg 25mg twice daily, flomax  -avoid high salt/sodium diet  -avoid caffeine  7.6cm distal AAA s/p repair 4/5/24 - continue vascular follow up, BP acceptable     Obtain blood and urine testing prior to next office visit. RTC in 4 months.    SUBJECTIVE / INTERVAL HISTORY:  59 y.o. male presents in hospital follow up of RAQUEL on CKD.     Sourav Saul s/p AAA repair.     Denies NSAID use. Uses tylenol as needed.  Bp well controlled now.    Review of Systems   Constitutional:  Negative for chills and fever.   HENT:  Negative for sore throat.    Eyes:  Negative for visual disturbance.   Respiratory:  Negative for cough and shortness of breath.    Cardiovascular:  Negative for chest pain and leg swelling.   Gastrointestinal:  Negative for abdominal pain, constipation, diarrhea, nausea and vomiting.   Endocrine: Negative for polyuria.   Genitourinary:  Negative for decreased urine volume, difficulty urinating, dysuria and hematuria.   Musculoskeletal:  Negative for back pain and myalgias.   Skin:  Negative for rash.   Neurological:  Negative for dizziness, light-headedness and numbness.   Psychiatric/Behavioral:  Negative for confusion.        OBJECTIVE:  /64 (BP Location: Left arm, Patient Position: Sitting, Cuff Size: Standard)   Pulse 62   Ht 5' 10\" (1.778 m)   Wt 85.7 kg (189 lb)   BMI 27.12 kg/m²  Body mass index is 27.12 kg/m².    Physical exam:  Physical Exam  Vitals reviewed.   Constitutional:       General: He is not in acute distress.     Appearance: Normal appearance. He is well-developed. He is not diaphoretic.   HENT:      Head: Normocephalic and atraumatic.      Nose: Nose " normal.      Mouth/Throat:      Mouth: Mucous membranes are moist.      Pharynx: No oropharyngeal exudate.   Eyes:      General: No scleral icterus.        Right eye: No discharge.         Left eye: No discharge.   Neck:      Thyroid: No thyromegaly.   Cardiovascular:      Rate and Rhythm: Normal rate and regular rhythm.      Heart sounds: Normal heart sounds.   Pulmonary:      Effort: Pulmonary effort is normal.      Breath sounds: Normal breath sounds. No wheezing or rales.   Abdominal:      General: Bowel sounds are normal. There is no distension.      Palpations: Abdomen is soft.      Tenderness: There is no abdominal tenderness.   Musculoskeletal:         General: No swelling. Normal range of motion.      Cervical back: Neck supple.   Lymphadenopathy:      Cervical: No cervical adenopathy.   Skin:     General: Skin is warm and dry.      Coloration: Skin is not jaundiced.      Findings: No rash.   Neurological:      General: No focal deficit present.      Mental Status: He is alert.      Comments: awake   Psychiatric:         Mood and Affect: Mood normal.         Behavior: Behavior normal.         Medications:    Current Outpatient Medications:     acetaminophen (TYLENOL) 325 mg tablet, Take 2 tablets (650 mg total) by mouth every 6 (six) hours as needed for mild pain, Disp: , Rfl:     aspirin 81 mg chewable tablet, Chew 1 tablet (81 mg total) daily, Disp: , Rfl:     atorvastatin (LIPITOR) 40 mg tablet, Take 1 tablet (40 mg total) by mouth daily, Disp: 90 tablet, Rfl: 4    carvedilol (COREG) 25 mg tablet, Take 1 tablet (25 mg total) by mouth 2 (two) times a day with meals, Disp: 30 tablet, Rfl: 3    Multiple Vitamin (Multivitamin Adult) TABS, Take by mouth, Disp: , Rfl:     tamsulosin (FLOMAX) 0.4 mg, Take 2 capsules (0.8 mg total) by mouth daily with dinner, Disp: 180 capsule, Rfl: 3    Vascepa 1 g CAPS, Every 12 hours, Disp: , Rfl:     methocarbamol (ROBAXIN) 500 mg tablet, Take 1 tablet (500 mg total) by  "mouth every 6 (six) hours (Patient not taking: Reported on 4/29/2024), Disp: 30 tablet, Rfl: 0    Allergies:  Allergies as of 05/07/2024    (No Known Allergies)       The following portions of the patient's history were reviewed and updated as appropriate: past family history, past surgical history and problem list.    Laboratory Results:  Lab Results   Component Value Date    SODIUM 140 05/01/2024    K 4.5 05/01/2024     05/01/2024    CO2 29 05/01/2024    BUN 25 05/01/2024    CREATININE 2.12 (H) 05/01/2024    GLUC 103 05/01/2024    CALCIUM 9.5 05/01/2024        Lab Results   Component Value Date    PTH 16.3 05/01/2024    CALCIUM 9.5 05/01/2024    PHOS 3.8 05/01/2024       Portions of the record may have been created with voice recognition software.  Occasional wrong word or \"sound a like\" substitutions may have occurred due to the inherent limitations of voice recognition software.  Read the chart carefully and recognize, using context, where substitutions have occurred.  "

## 2024-05-07 NOTE — PATIENT INSTRUCTIONS
CKD stage 3b in setting of hypertensive nephrosclerosis - suspect baseline sCr low 2s, with last sCr 2.12 as of 5/1/24  -UA with microscopy showed small blood, trace protein, 4-10 RBCs, without bacteria or protein  -repeat UA with microscopy  -no significant proteinuria noted  -renal u/s showed normal kidneys as of Feb. 2024.  -repeat BMP in 3 months  -CKD education booklet provided in office   -stay well hydrated  -follow low sodium diet  -refer to kidney nutrition class  Mild anemia - Hgb 11.5, monitor CBC post op  HTN - BP low normal, continue coreg 25mg twice daily, flomax  -avoid high salt/sodium diet  -avoid caffeine  7.6cm distal AAA s/p repair 4/5/24 - continue vascular follow up, BP acceptable     Obtain blood and urine testing prior to next office visit. RTC in 4 months.

## 2024-05-08 ENCOUNTER — TELEPHONE (OUTPATIENT)
Dept: NEPHROLOGY | Facility: CLINIC | Age: 59
End: 2024-05-08

## 2024-05-08 NOTE — TELEPHONE ENCOUNTER
Patient is aware and stating that He will discuss this with urology to ensure there is no other indication for planned cystoscopy. No further questions at this time.

## 2024-05-08 NOTE — TELEPHONE ENCOUNTER
----- Message from Jennifer Truong DO sent at 5/7/2024  3:27 PM EDT -----  Please let the patient know he does not have RBCs in the urine. RBCs appear to be the reason for cystoscopy being scheduled. He should discuss this with urology to ensure there is no other indication for planned cystoscopy. Thanks.

## 2024-05-21 ENCOUNTER — PROCEDURE VISIT (OUTPATIENT)
Dept: UROLOGY | Facility: CLINIC | Age: 59
End: 2024-05-21
Payer: COMMERCIAL

## 2024-05-21 VITALS
OXYGEN SATURATION: 98 % | HEIGHT: 70 IN | HEART RATE: 69 BPM | DIASTOLIC BLOOD PRESSURE: 68 MMHG | BODY MASS INDEX: 27.35 KG/M2 | SYSTOLIC BLOOD PRESSURE: 118 MMHG | WEIGHT: 191 LBS

## 2024-05-21 DIAGNOSIS — R35.1 BENIGN PROSTATIC HYPERPLASIA WITH NOCTURIA: ICD-10-CM

## 2024-05-21 DIAGNOSIS — N40.1 BENIGN PROSTATIC HYPERPLASIA WITH NOCTURIA: ICD-10-CM

## 2024-05-21 DIAGNOSIS — R31.29 MICROSCOPIC HEMATURIA: Primary | ICD-10-CM

## 2024-05-21 LAB
SL AMB  POCT GLUCOSE, UA: NORMAL
SL AMB LEUKOCYTE ESTERASE,UA: NORMAL
SL AMB POCT BILIRUBIN,UA: NORMAL
SL AMB POCT BLOOD,UA: NORMAL
SL AMB POCT CLARITY,UA: CLEAR
SL AMB POCT COLOR,UA: YELLOW
SL AMB POCT KETONES,UA: NORMAL
SL AMB POCT NITRITE,UA: NORMAL
SL AMB POCT PH,UA: NORMAL
SL AMB POCT SPECIFIC GRAVITY,UA: 1.02
SL AMB POCT URINE PROTEIN: NORMAL
SL AMB POCT UROBILINOGEN: NORMAL

## 2024-05-21 PROCEDURE — 52000 CYSTOURETHROSCOPY: CPT | Performed by: UROLOGY

## 2024-05-21 PROCEDURE — 81002 URINALYSIS NONAUTO W/O SCOPE: CPT | Performed by: UROLOGY

## 2024-05-21 RX ORDER — OXYBUTYNIN CHLORIDE 10 MG/1
10 TABLET, EXTENDED RELEASE ORAL
Qty: 90 TABLET | Refills: 3 | Status: SHIPPED | OUTPATIENT
Start: 2024-05-21

## 2024-05-21 NOTE — PROGRESS NOTES
Cystoscopy     Date/Time  5/21/2024 9:00 AM     Performed by  Carlos Sheikh MD   Authorized by  Carlos Sheikh MD         Ed is a 59-year-old male with a history of hematuria.  He is a heavy lifetime smoker.  As part of his workup he had both an ultrasound of the kidneys and bladder performed in February 2024 that showed a 7.6 cm abdominal aortic aneurysm.  He also had a CT angiogram of his chest abdomen and pelvis confirming the large abdominal aortic aneurysm.  I referred the patient to vascular surgery and he underwent an open abdominal aortic aneurysm repair.  He returns in follow-up today to undergo cystoscopy to assess his bladder.  He complains of significant nocturia.  He is currently on tamsulosin.      Male cystoscopy procedure note:  Risk and benefits of flexible cystoscopy were discussed. Informed consent was obtained. The patient was placed in the supine position. His genitalia was prepped and draped in a sterile fashion. Viscous lidocaine jelly was instilled into the urethra and flexible cystoscopy was then performed. The urethra, prostatic urethra, and bladder were all thoroughly inspected there was no evidence of mucosal abnormalities or lesions. Both ureteral orifices were visualized with clear efflux of urine. Retroflexion was normal. Overall this was a negative cystoscopy without urothelial carcinoma noted.    Impression: Microscopic hematuria, negative hematuria workup, status post open repair of abdominal aortic aneurysm    Plan: I recommend continuing the tamsulosin 0.4 mg daily at bedtime.  I have added Ditropan XL 10 mg as well for his nocturia.  Side effect profile discussed and reviewed.  Follow-up in 2 to 3 months to assess the efficacy of the combination therapy.  Will obtain PVR uroflow at that time.  Follow-up in February 2025 with his next PSA level and digital rectal examination for routine prostate cancer screening.

## 2024-07-16 ENCOUNTER — HOSPITAL ENCOUNTER (OUTPATIENT)
Dept: NON INVASIVE DIAGNOSTICS | Age: 59
Discharge: HOME | End: 2024-07-16
Payer: COMMERCIAL

## 2024-07-16 DIAGNOSIS — I72.3 ILIAC ARTERY ANEURYSM, BILATERAL (HCC): ICD-10-CM

## 2024-07-16 DIAGNOSIS — R09.89 PROMINENT POPLITEAL PULSE: ICD-10-CM

## 2024-07-16 DIAGNOSIS — I71.43 INFRARENAL ABDOMINAL AORTIC ANEURYSM (AAA) WITHOUT RUPTURE (HCC): ICD-10-CM

## 2024-07-16 DIAGNOSIS — R09.89 LEFT CAROTID BRUIT: ICD-10-CM

## 2024-07-16 PROCEDURE — 93925 LOWER EXTREMITY STUDY: CPT

## 2024-07-16 PROCEDURE — 93922 UPR/L XTREMITY ART 2 LEVELS: CPT | Performed by: SURGERY

## 2024-07-16 PROCEDURE — 93880 EXTRACRANIAL BILAT STUDY: CPT | Performed by: SURGERY

## 2024-07-16 PROCEDURE — 93925 LOWER EXTREMITY STUDY: CPT | Performed by: SURGERY

## 2024-07-16 PROCEDURE — 93880 EXTRACRANIAL BILAT STUDY: CPT

## 2024-07-16 PROCEDURE — 93978 VASCULAR STUDY: CPT

## 2024-07-16 PROCEDURE — 93923 UPR/LXTR ART STDY 3+ LVLS: CPT

## 2024-07-20 PROCEDURE — 93978 VASCULAR STUDY: CPT | Performed by: SURGERY

## 2024-07-21 PROBLEM — Z12.5 SCREENING FOR PROSTATE CANCER: Status: ACTIVE | Noted: 2024-07-21

## 2024-07-21 PROBLEM — R31.29 MICROSCOPIC HEMATURIA: Status: ACTIVE | Noted: 2024-07-21

## 2024-07-21 PROBLEM — Z72.0 TOBACCO USE: Status: ACTIVE | Noted: 2024-07-21

## 2024-07-22 ENCOUNTER — OFFICE VISIT (OUTPATIENT)
Dept: UROLOGY | Facility: HOSPITAL | Age: 59
End: 2024-07-22
Payer: COMMERCIAL

## 2024-07-22 VITALS
WEIGHT: 206.2 LBS | SYSTOLIC BLOOD PRESSURE: 132 MMHG | BODY MASS INDEX: 29.52 KG/M2 | OXYGEN SATURATION: 98 % | DIASTOLIC BLOOD PRESSURE: 82 MMHG | HEART RATE: 62 BPM | HEIGHT: 70 IN

## 2024-07-22 DIAGNOSIS — Z72.0 TOBACCO USE: ICD-10-CM

## 2024-07-22 DIAGNOSIS — Z12.5 SCREENING FOR PROSTATE CANCER: ICD-10-CM

## 2024-07-22 DIAGNOSIS — N40.0 BENIGN PROSTATIC HYPERPLASIA WITHOUT LOWER URINARY TRACT SYMPTOMS: Primary | ICD-10-CM

## 2024-07-22 DIAGNOSIS — I71.40 ABDOMINAL AORTIC ANEURYSM (AAA) WITHOUT RUPTURE, UNSPECIFIED PART (HCC): ICD-10-CM

## 2024-07-22 DIAGNOSIS — R31.29 MICROSCOPIC HEMATURIA: ICD-10-CM

## 2024-07-22 LAB
POST-VOID RESIDUAL VOLUME, ML POC: 24 ML
SL AMB  POCT GLUCOSE, UA: NORMAL
SL AMB LEUKOCYTE ESTERASE,UA: NORMAL
SL AMB POCT BILIRUBIN,UA: NORMAL
SL AMB POCT BLOOD,UA: NORMAL
SL AMB POCT CLARITY,UA: CLEAR
SL AMB POCT COLOR,UA: YELLOW
SL AMB POCT KETONES,UA: NORMAL
SL AMB POCT NITRITE,UA: NORMAL
SL AMB POCT PH,UA: 5
SL AMB POCT SPECIFIC GRAVITY,UA: 1.01
SL AMB POCT URINE PROTEIN: NORMAL
SL AMB POCT UROBILINOGEN: 0.2

## 2024-07-22 PROCEDURE — 81002 URINALYSIS NONAUTO W/O SCOPE: CPT

## 2024-07-22 PROCEDURE — 99214 OFFICE O/P EST MOD 30 MIN: CPT

## 2024-07-22 PROCEDURE — 51798 US URINE CAPACITY MEASURE: CPT

## 2024-07-22 NOTE — ASSESSMENT & PLAN NOTE
Most recent PSA from 2/14/24 was 3.35  No other PSA results on file   LACHELLE last visit with Dr. Sheikh reported as benign   Denies family history of prostate cancer   Continue to monitor PSA/LACHELLE on an annual basis

## 2024-07-22 NOTE — ASSESSMENT & PLAN NOTE
Urine studies 5/7/24 - negative for occult blood   Urine dip in office also negative for infection and blood   Renal bladder ultrasound 2/6/24  - No hydronephrosis. No visualized renal calculi. Incidental note is made of a 7.6 cm distal abdominal aortic aneurysm.  S/p open repair of abdominal aortic aneurysm 4/5/24 by vascular surgery   Cystoscopy 5/21/24 by Dr. Sheikh -- The urethra, prostatic urethra, and bladder were all thoroughly inspected there was no evidence of mucosal abnormalities or lesions   Continue to monitor - no intervention needed for time being

## 2024-07-22 NOTE — PROGRESS NOTES
Ambulatory Visit  Name: Sourav Saul      : 1965      MRN: [id number]  Encounter Provider: Eveline Iyer PA-C  Encounter Date: 2024   Encounter department: [facility]    Assessment & Plan   1. Benign prostatic hyperplasia without lower urinary tract symptoms  Assessment & Plan:  AUA score 3 today in office   PVR 24 mL - no retention   Continue taking flomax 0.4 mg daily as prescribed   Continue Ditropan XL 10 mg as well for his nocturia   Refill as needed   Encouraged increased hydration and avoidance of bladder irritants / constipation   Continue to monitor on an annual basis   Orders:  -     POCT urine dip  -     POCT Measure PVR  2. Abdominal aortic aneurysm (AAA) without rupture, unspecified part (HCC)  Assessment & Plan:  Renal bladder ultrasound 24  - No hydronephrosis. No visualized renal calculi. Incidental note is made of a 7.6 cm distal abdominal aortic aneurysm.  S/p open repair of abdominal aortic aneurysm 24 by vascular surgery  Vascular surgeon notes reviewed   Patient denies chest pain - feels well   Orders:  -     POCT urine dip  -     POCT Measure PVR  3. Microscopic hematuria  Assessment & Plan:  Urine studies 24 - negative for occult blood   Urine dip in office also negative for infection and blood   Renal bladder ultrasound 24  - No hydronephrosis. No visualized renal calculi. Incidental note is made of a 7.6 cm distal abdominal aortic aneurysm.  S/p open repair of abdominal aortic aneurysm 24 by vascular surgery   Cystoscopy 24 by Dr. Sheikh -- The urethra, prostatic urethra, and bladder were all thoroughly inspected there was no evidence of mucosal abnormalities or lesions   Continue to monitor - no intervention needed for time being   Orders:  -     POCT urine dip  -     POCT Measure PVR  4. Screening for prostate cancer  Assessment & Plan:  Most recent PSA from 24 was 3.35  No other PSA results on file   LACHELLE  last visit with Dr. Sheikh reported as benign   Denies family history of prostate cancer   Continue to monitor PSA/LACHELLE on an annual basis   Orders:  -     POCT urine dip  -     POCT Measure PVR  -     PSA, Total Screen; Future; Expected date: 01/22/2025  5. Tobacco use  Assessment & Plan:  Reports smoking 1-2pk of cigarettes daily for 30+ years    Currently smokes 1 cigarette a day   Orders:  -     POCT urine dip  -     POCT Measure PVR      History of Present Illness     Sourav Saul is a 59 y.o. male who presents to the office for follow-up to discuss BPH, prostate cancer screening, and recent AAA repair.  Patient was evaluated in office by Dr. Sheikh earlier this year.  Most recent PSA from 2/14/2024 was 3.35.  No previous PSAs on file.  LACHELLE completed last visit by Dr. Sheikh was reported as benign.  He denies family history of prostate cancer.  Continue to monitor PSA/LACHELLE on an annual basis.    Patient's AUA score is 3 today in office.  PVR 24 mL, no signs of urinary retention.  He continues taking Flomax 0.4 mg daily as well as Ditropan XL 10 mg daily for his nocturia.  Patient states since beginning Ditropan he has noticed significant improvement in his nighttime urination.  He reports he used to wake up roughly every hour.  He is now only waking up roughly 1-2 times nightly.  Refill both medications as needed.  Patient encouraged to increase hydration and avoid bladder irritants/constipation.  Patient reports being overall much more content with his voiding pattern.  He currently denies dysuria, hematuria, incomplete emptying, incontinence, or pain in the bladder/bilateral flanks.  Continue to monitor progression of urinary symptoms on an annual basis.    Of note: Renal bladder ultrasound completed on 2/6/2024 revealed an incidental finding of a 7.6 cm distal abdominal aortic aneurysm.  He is now s/p open repair of AAA on 4/5/2024 by vascular surgery.  Patient reports he feels well overall and there  is no complications postoperatively.  Patient had previously reported that he quit tobacco, he used to smoke 1-2 packs of cigarettes a day for greater than 30 years.  He says he currently smokes 1 cigarette daily and plans to keep doing so for the time being.    Review of Systems   Constitutional:  Negative for activity change, chills, fatigue and fever.   Respiratory:  Negative for apnea, cough and shortness of breath.    Cardiovascular:  Negative for chest pain and leg swelling.   Gastrointestinal:  Negative for abdominal distention, abdominal pain, constipation, diarrhea, nausea and vomiting.   Genitourinary:  Positive for frequency and urgency. Negative for decreased urine volume, difficulty urinating, dysuria, flank pain, hematuria, penile pain and testicular pain.        Nocturia 1-2x nightly    Neurological:  Negative for dizziness and headaches.   Psychiatric/Behavioral: Negative.       Medical History Reviewed by provider this encounter:  Tobacco  Allergies  Meds  Problems  Med Hx  Surg Hx  Fam Hx       Current Outpatient Medications on File Prior to Visit   Medication Sig Dispense Refill    aspirin 81 mg chewable tablet Chew 1 tablet (81 mg total) daily      atorvastatin (LIPITOR) 40 mg tablet Take 1 tablet (40 mg total) by mouth daily 90 tablet 4    carvedilol (COREG) 25 mg tablet Take 1 tablet (25 mg total) by mouth 2 (two) times a day with meals 30 tablet 3    Multiple Vitamin (Multivitamin Adult) TABS Take by mouth      oxybutynin (DITROPAN-XL) 10 MG 24 hr tablet Take 1 tablet (10 mg total) by mouth daily at bedtime 90 tablet 3    tamsulosin (FLOMAX) 0.4 mg Take 2 capsules (0.8 mg total) by mouth daily with dinner 180 capsule 3    acetaminophen (TYLENOL) 325 mg tablet Take 2 tablets (650 mg total) by mouth every 6 (six) hours as needed for mild pain (Patient not taking: Reported on 7/22/2024)      methocarbamol (ROBAXIN) 500 mg tablet Take 1 tablet (500 mg total) by mouth every 6 (six) hours  "(Patient not taking: Reported on 4/29/2024) 30 tablet 0    Vascepa 1 g CAPS Every 12 hours (Patient not taking: Reported on 7/22/2024)       No current facility-administered medications on file prior to visit.      Social History     Tobacco Use    Smoking status: Every Day     Current packs/day: 0.25     Types: Cigarettes    Smokeless tobacco: Never    Tobacco comments:     Quit smoking 3/5   Vaping Use    Vaping status: Never Used   Substance and Sexual Activity    Alcohol use: Not Currently     Comment: I don't drink alcohol    Drug use: Never    Sexual activity: Not Currently     Partners: Female     Birth control/protection: None     AUA SYMPTOM SCORE      Flowsheet Row Most Recent Value   AUA SYMPTOM SCORE    How often have you had a sensation of not emptying your bladder completely after you finished urinating? 0 (P)     How often have you had to urinate again less than two hours after you finished urinating? 1 (P)     How often have you found you stopped and started again several times when you urinate? 0 (P)     How often have you found it difficult to postpone urination? 0 (P)     How often have you had a weak urinary stream? 0 (P)     How often have you had to push or strain to begin urination? 1 (P)     How many times did you most typically get up to urinate from the time you went to bed at night until the time you got up in the morning? 1 (P)     Quality of Life: If you were to spend the rest of your life with your urinary condition just the way it is now, how would you feel about that? 3 (P)     AUA SYMPTOM SCORE 3 (P)            Objective     /82 (BP Location: Left arm, Patient Position: Sitting, Cuff Size: Adult)   Pulse 62   Ht 5' 10\" (1.778 m)   Wt 93.5 kg (206 lb 3.2 oz)   SpO2 98%   BMI 29.59 kg/m²   Physical Exam  Vitals and nursing note reviewed.   Constitutional:       General: He is not in acute distress.     Appearance: Normal appearance. He is well-developed. He is not " ill-appearing.   HENT:      Head: Normocephalic and atraumatic.   Eyes:      Conjunctiva/sclera: Conjunctivae normal.   Cardiovascular:      Rate and Rhythm: Normal rate and regular rhythm.      Heart sounds: No murmur heard.  Pulmonary:      Effort: Pulmonary effort is normal. No respiratory distress.      Breath sounds: Normal breath sounds.   Abdominal:      General: Abdomen is flat. There is no distension.      Palpations: Abdomen is soft.      Tenderness: There is no abdominal tenderness. There is no right CVA tenderness or left CVA tenderness.   Genitourinary:     Comments: LACHELLE completed last visit reported as benign, no nodules or irregularities palpated.  Repeat LACHELLE at next office visit.  Musculoskeletal:         General: No swelling.      Cervical back: Neck supple.   Skin:     General: Skin is warm and dry.      Capillary Refill: Capillary refill takes less than 2 seconds.   Neurological:      Mental Status: He is alert.   Psychiatric:         Mood and Affect: Mood normal.       Results  Lab Results   Component Value Date    PSA 3.35 02/14/2024     Lab Results   Component Value Date    GLUCOSE 141 (H) 04/05/2024    CALCIUM 9.5 05/01/2024    K 4.5 05/01/2024    CO2 29 05/01/2024     05/01/2024    BUN 25 05/01/2024    CREATININE 2.12 (H) 05/01/2024     Lab Results   Component Value Date    WBC 10.57 (H) 05/01/2024    HGB 11.5 (L) 05/01/2024    HCT 36.8 05/01/2024    MCV 88 05/01/2024     05/01/2024       Office Urine Dip  No results found for this or any previous visit (from the past 1 hour(s)).  ]    Administrative Statements   I have spent a total time of 35 minutes in caring for this patient on the day of the visit/encounter including Diagnostic results, Prognosis, Risks and benefits of tx options, Instructions for management, Patient and family education, Importance of tx compliance, Risk factor reductions, Impressions, Counseling / Coordination of care, Documenting in the medical record,  Reviewing / ordering tests, medicine, procedures  , and Obtaining or reviewing history  .

## 2024-07-22 NOTE — ASSESSMENT & PLAN NOTE
AUA score 3 today in office   PVR 24 mL - no retention   Continue taking flomax 0.4 mg daily as prescribed   Continue Ditropan XL 10 mg as well for his nocturia   Refill as needed   Encouraged increased hydration and avoidance of bladder irritants / constipation   Continue to monitor on an annual basis

## 2024-07-22 NOTE — ASSESSMENT & PLAN NOTE
Renal bladder ultrasound 2/6/24  - No hydronephrosis. No visualized renal calculi. Incidental note is made of a 7.6 cm distal abdominal aortic aneurysm.  S/p open repair of abdominal aortic aneurysm 4/5/24 by vascular surgery  Vascular surgeon notes reviewed   Patient denies chest pain - feels well

## 2024-07-29 ENCOUNTER — OFFICE VISIT (OUTPATIENT)
Dept: VASCULAR SURGERY | Facility: CLINIC | Age: 59
End: 2024-07-29
Payer: COMMERCIAL

## 2024-07-29 VITALS
HEIGHT: 70 IN | SYSTOLIC BLOOD PRESSURE: 120 MMHG | OXYGEN SATURATION: 96 % | WEIGHT: 206.6 LBS | DIASTOLIC BLOOD PRESSURE: 76 MMHG | HEART RATE: 63 BPM | BODY MASS INDEX: 29.58 KG/M2

## 2024-07-29 DIAGNOSIS — I71.43 INFRARENAL ABDOMINAL AORTIC ANEURYSM (AAA) WITHOUT RUPTURE (HCC): Primary | ICD-10-CM

## 2024-07-29 DIAGNOSIS — E78.2 MIXED HYPERLIPIDEMIA: ICD-10-CM

## 2024-07-29 DIAGNOSIS — I72.4 POPLITEAL ARTERY ANEURYSM, BILATERAL (HCC): ICD-10-CM

## 2024-07-29 DIAGNOSIS — N18.32 STAGE 3B CHRONIC KIDNEY DISEASE (HCC): ICD-10-CM

## 2024-07-29 DIAGNOSIS — I65.23 ASYMPTOMATIC CAROTID ARTERY STENOSIS, BILATERAL: ICD-10-CM

## 2024-07-29 DIAGNOSIS — I72.3 ILIAC ARTERY ANEURYSM, BILATERAL (HCC): ICD-10-CM

## 2024-07-29 DIAGNOSIS — Z72.0 TOBACCO USE: ICD-10-CM

## 2024-07-29 PROCEDURE — 99215 OFFICE O/P EST HI 40 MIN: CPT | Performed by: SURGERY
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
## 2024-08-20 PROBLEM — Z12.5 SCREENING FOR PROSTATE CANCER: Status: RESOLVED | Noted: 2024-07-21 | Resolved: 2024-08-20

## 2024-09-12 ENCOUNTER — TELEPHONE (OUTPATIENT)
Dept: CARDIOLOGY CLINIC | Facility: CLINIC | Age: 59
End: 2024-09-12

## 2024-09-12 ENCOUNTER — NURSE TRIAGE (OUTPATIENT)
Age: 59
End: 2024-09-12

## 2024-09-12 DIAGNOSIS — I77.1 ILIAC ARTERY STENOSIS, RIGHT (HCC): Primary | ICD-10-CM

## 2024-09-12 DIAGNOSIS — I72.3 ILIAC ARTERY ANEURYSM, BILATERAL (HCC): ICD-10-CM

## 2024-09-12 NOTE — TELEPHONE ENCOUNTER
Called and s/w pt, AOIL w/ [facility] scheduled for [place] 10/7/24. Relayed instructions to go to ER if symptoms worsen. Pt verbalized understanding.

## 2024-09-12 NOTE — TELEPHONE ENCOUNTER
Description of symptoms sound more MSK in nature.     LEAD imaging from 7/16 without significant findings in RLE [facility]I 1.09/153/91    There was moderate distal EIA stenosis with slightly elevated velocities on AOIL.    Please get repeat AOIL with [facility] to evaluate for worsening disease.      Order placed, please schedule for ASAP.     In meantime, continue to monitor, ED precautions for worsening symptoms.

## 2024-09-12 NOTE — TELEPHONE ENCOUNTER
Regarding: RLE pain and numbness  ----- Message from Darcy MORALES sent at 9/12/2024 10:28 AM EDT -----  Keiry from [facility] in [place] calling on behalf of patient. He was in their office today for an appointment and reported symptoms that his Cardio team feels should be addressed by Vascular. He is s/p open aorta iliac aneurysm repair with LMD 4/5/24, last saw Dr. Wilson 7/29 in office. He reports RLE pain and difficulty walking for the past few weeks. He states pain starts in the back of his ankle and travels up into his leg. He also reports numbness and loss of control of his leg.

## 2024-09-12 NOTE — TELEPHONE ENCOUNTER
"LOV 7/29 please see note for further details and last AOIL and PETTY done 7/16    Received call from pt c/o R sided leg pain that extends up the back of his right leg from ankle to buttock while walking.  He is able to walk for about 5 minutes before he feels the pain.  He states he also feels like his R foot gets numb while walking.  He denies any temperature or color changes to the foot and denies any wounds, fever or SOB.  Pain does subside after resting for a few minutes and does not experience pain in the middle of the night.    Advised pt a message would be sent to the provide to review and advise.    Answer Assessment - Initial Assessment Questions  1. ONSET: \"When did the pain start?\"       2 weeks   2. LOCATION: \"Where is the pain located?\"       R side only Back of leg Ankle to buttocks goes away with rest  3. PAIN: \"How bad is the pain?\"    (Scale 1-10; or mild, moderate, severe)    -  MILD (1-3): doesn't interfere with normal activities     -  MODERATE (4-7): interferes with normal activities (e.g., work or school) or awakens from sleep, limping     -  SEVERE (8-10): excruciating pain, unable to do any normal activities, unable to walk      Pain starts after walking about 5 minutes   4. WORK OR EXERCISE: \"Has there been any recent work or exercise that involved this part of the body?\"       Denies, maybe cutting the grass   5. CAUSE: \"What do you think is causing the leg pain?\"      Unsure   6. OTHER SYMPTOMS: \"Do you have any other symptoms?\" (e.g., chest pain, back pain, breathing difficulty, swelling, rash, fever, numbness, weakness)      Numbness in the foot with pain, no temp or color changes, denies any wounds, denies any fever or chills, pt states he did not have any of these symptoms when he saw Dr Doctor james in July    Protocols used: Leg Pain-ADULT-[place]    "

## 2024-09-12 NOTE — TELEPHONE ENCOUNTER
Called patient and asked about back pain. Patient stated he has had back pain for as long as he can remember. Patient denies h/o back procedures.

## 2024-09-12 NOTE — TELEPHONE ENCOUNTER
[facility] came into [facility] in [place] (Cardiology office) concerned about problems he is experiencing with his right leg. Pt sees Dr Wilson and was last seen on 7/30/24.     I called the Vascular office and spoke with nurse Darcy and informed her that pt is experiencing pain that starts in ankle and back of foot and goes up his Right leg. He is experiencing numbness and feels like he has no control over his leg. Patient stated this has been happening for a couple of weeks.     I verified the patients primary phone number ending in [phone] and was instructed by Darcy that she will be sending this information to the Nursing Staff in the Vascular department and someone will contact the patient directly.    I informed the patient someone will contact him and he left the cardiology office.

## 2024-09-20 ENCOUNTER — TELEPHONE (OUTPATIENT)
Dept: NEPHROLOGY | Facility: CLINIC | Age: 59
End: 2024-09-20

## 2024-09-20 ENCOUNTER — APPOINTMENT (OUTPATIENT)
Dept: LAB | Facility: HOSPITAL | Age: 59
End: 2024-09-20
Payer: COMMERCIAL

## 2024-09-20 DIAGNOSIS — D64.9 ANEMIA, UNSPECIFIED TYPE: ICD-10-CM

## 2024-09-20 DIAGNOSIS — N18.32 STAGE 3B CHRONIC KIDNEY DISEASE (HCC): ICD-10-CM

## 2024-09-20 LAB
ANION GAP SERPL CALCULATED.3IONS-SCNC: 8 MMOL/L (ref 4–13)
BACTERIA UR QL AUTO: NORMAL /HPF
BASOPHILS # BLD AUTO: 0.06 THOUSANDS/ΜL (ref 0–0.1)
BASOPHILS NFR BLD AUTO: 1 % (ref 0–1)
BILIRUB UR QL STRIP: NEGATIVE
BUN SERPL-MCNC: 23 MG/DL (ref 5–25)
CALCIUM SERPL-MCNC: 8.9 MG/DL (ref 8.4–10.2)
CHLORIDE SERPL-SCNC: 109 MMOL/L (ref 96–108)
CLARITY UR: CLEAR
CO2 SERPL-SCNC: 27 MMOL/L (ref 21–32)
COLOR UR: YELLOW
CREAT SERPL-MCNC: 2.16 MG/DL (ref 0.6–1.3)
CREAT UR-MCNC: 126.3 MG/DL
CREAT UR-MCNC: 127.1 MG/DL
EOSINOPHIL # BLD AUTO: 0.36 THOUSAND/ΜL (ref 0–0.61)
EOSINOPHIL NFR BLD AUTO: 4 % (ref 0–6)
ERYTHROCYTE [DISTWIDTH] IN BLOOD BY AUTOMATED COUNT: 15.9 % (ref 11.6–15.1)
GFR SERPL CREATININE-BSD FRML MDRD: 32 ML/MIN/1.73SQ M
GLUCOSE P FAST SERPL-MCNC: 167 MG/DL (ref 65–99)
GLUCOSE UR STRIP-MCNC: NEGATIVE MG/DL
HCT VFR BLD AUTO: 41 % (ref 36.5–49.3)
HGB BLD-MCNC: 13.2 G/DL (ref 12–17)
HGB UR QL STRIP.AUTO: NEGATIVE
IMM GRANULOCYTES # BLD AUTO: 0.08 THOUSAND/UL (ref 0–0.2)
IMM GRANULOCYTES NFR BLD AUTO: 1 % (ref 0–2)
KETONES UR STRIP-MCNC: NEGATIVE MG/DL
LEUKOCYTE ESTERASE UR QL STRIP: NEGATIVE
LYMPHOCYTES # BLD AUTO: 3.04 THOUSANDS/ΜL (ref 0.6–4.47)
LYMPHOCYTES NFR BLD AUTO: 30 % (ref 14–44)
MAGNESIUM SERPL-MCNC: 2.1 MG/DL (ref 1.9–2.7)
MCH RBC QN AUTO: 26.1 PG (ref 26.8–34.3)
MCHC RBC AUTO-ENTMCNC: 32.2 G/DL (ref 31.4–37.4)
MCV RBC AUTO: 81 FL (ref 82–98)
MICROALBUMIN UR-MCNC: 19.3 MG/L
MICROALBUMIN/CREAT 24H UR: 15 MG/G CREATININE (ref 0–30)
MONOCYTES # BLD AUTO: 0.92 THOUSAND/ΜL (ref 0.17–1.22)
MONOCYTES NFR BLD AUTO: 9 % (ref 4–12)
NEUTROPHILS # BLD AUTO: 5.63 THOUSANDS/ΜL (ref 1.85–7.62)
NEUTS SEG NFR BLD AUTO: 55 % (ref 43–75)
NITRITE UR QL STRIP: NEGATIVE
NON-SQ EPI CELLS URNS QL MICRO: NORMAL /HPF
NRBC BLD AUTO-RTO: 0 /100 WBCS
PH UR STRIP.AUTO: 6 [PH]
PHOSPHATE SERPL-MCNC: 3.4 MG/DL (ref 2.7–4.5)
PLATELET # BLD AUTO: 245 THOUSANDS/UL (ref 149–390)
PMV BLD AUTO: 9.9 FL (ref 8.9–12.7)
POTASSIUM SERPL-SCNC: 4.4 MMOL/L (ref 3.5–5.3)
PROT UR STRIP-MCNC: NEGATIVE MG/DL
PROT UR-MCNC: 11.6 MG/DL
PROT/CREAT UR: 0.1 MG/G{CREAT} (ref 0–0.1)
PTH-INTACT SERPL-MCNC: 44.9 PG/ML (ref 12–88)
RBC # BLD AUTO: 5.06 MILLION/UL (ref 3.88–5.62)
RBC #/AREA URNS AUTO: NORMAL /HPF
SODIUM SERPL-SCNC: 144 MMOL/L (ref 135–147)
SP GR UR STRIP.AUTO: 1.02 (ref 1–1.03)
UROBILINOGEN UR STRIP-ACNC: <2 MG/DL
WBC # BLD AUTO: 10.09 THOUSAND/UL (ref 4.31–10.16)
WBC #/AREA URNS AUTO: NORMAL /HPF

## 2024-09-20 PROCEDURE — 82043 UR ALBUMIN QUANTITATIVE: CPT

## 2024-09-20 PROCEDURE — 81001 URINALYSIS AUTO W/SCOPE: CPT

## 2024-09-20 PROCEDURE — 82570 ASSAY OF URINE CREATININE: CPT

## 2024-09-20 PROCEDURE — 84100 ASSAY OF PHOSPHORUS: CPT

## 2024-09-20 PROCEDURE — 85025 COMPLETE CBC W/AUTO DIFF WBC: CPT

## 2024-09-20 PROCEDURE — 36415 COLL VENOUS BLD VENIPUNCTURE: CPT

## 2024-09-20 PROCEDURE — 83970 ASSAY OF PARATHORMONE: CPT

## 2024-09-20 PROCEDURE — 80048 BASIC METABOLIC PNL TOTAL CA: CPT

## 2024-09-20 PROCEDURE — 84156 ASSAY OF PROTEIN URINE: CPT

## 2024-09-20 PROCEDURE — 83735 ASSAY OF MAGNESIUM: CPT

## 2024-09-20 NOTE — TELEPHONE ENCOUNTER
Called and spoke with patient to reschedule his December appointment to Monday 9/23 at 1:30 pm with Claire Wallace. He is aware to have his labs done today.

## 2024-09-23 ENCOUNTER — OFFICE VISIT (OUTPATIENT)
Dept: NEPHROLOGY | Facility: HOSPITAL | Age: 59
End: 2024-09-23
Payer: COMMERCIAL

## 2024-09-23 VITALS
OXYGEN SATURATION: 99 % | BODY MASS INDEX: 29.92 KG/M2 | SYSTOLIC BLOOD PRESSURE: 146 MMHG | WEIGHT: 209 LBS | HEIGHT: 70 IN | DIASTOLIC BLOOD PRESSURE: 88 MMHG | HEART RATE: 56 BPM

## 2024-09-23 DIAGNOSIS — N18.32 STAGE 3B CHRONIC KIDNEY DISEASE (HCC): ICD-10-CM

## 2024-09-23 DIAGNOSIS — I10 PRIMARY HYPERTENSION: ICD-10-CM

## 2024-09-23 DIAGNOSIS — R31.29 OTHER MICROSCOPIC HEMATURIA: ICD-10-CM

## 2024-09-23 DIAGNOSIS — N18.32 STAGE 3B CHRONIC KIDNEY DISEASE (HCC): Primary | ICD-10-CM

## 2024-09-23 DIAGNOSIS — I71.40 ABDOMINAL AORTIC ANEURYSM (AAA), UNSPECIFIED PART, UNSPECIFIED WHETHER RUPTURED (HCC): ICD-10-CM

## 2024-09-23 PROCEDURE — 99214 OFFICE O/P EST MOD 30 MIN: CPT | Performed by: PHYSICIAN ASSISTANT

## 2024-09-23 RX ORDER — AMLODIPINE BESYLATE 5 MG/1
5 TABLET ORAL DAILY
Qty: 30 TABLET | Refills: 3 | Status: SHIPPED | OUTPATIENT
Start: 2024-09-23 | End: 2024-09-24

## 2024-09-23 NOTE — PATIENT INSTRUCTIONS
Start amlodipine 5mg daily for blood pressure  Take the medicine for 1 week and then write down a week of BP readings at home and call or send into office   Follow up in 4 months with repeat labs prior
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
## 2024-09-24 RX ORDER — AMLODIPINE BESYLATE 5 MG/1
5 TABLET ORAL DAILY
Qty: 90 TABLET | Refills: 0 | Status: SHIPPED | OUTPATIENT
Start: 2024-09-24

## 2024-10-03 ENCOUNTER — NURSE TRIAGE (OUTPATIENT)
Age: 59
End: 2024-10-03

## 2024-10-03 ENCOUNTER — TELEPHONE (OUTPATIENT)
Age: 59
End: 2024-10-03

## 2024-10-03 NOTE — TELEPHONE ENCOUNTER
"Lidia NP w/ [employer] called. She works w/ pt's insurance and performs house calls. This is second time seeing pt and he has a new c/o RLE pain. Pain RLE constant increases w/ ambulation but has at rest sometimes but also improves at rest.      Per Lidia BLE are same temp and color, she can feel dp on L but not on R. Pt denies rest pain or wounds.  Pain increases w/ activity such as ambulating 200ft or climbing stairs, mowing lawn. Pain begins R anterior thigh and extends to calf, pt notes it also radiates to ankle and back. This started approx one month ago. He is also c/o numbness RLE.    ? Pt if he has hx of back issues, he states he does have back problems but denies being tx for back issues in the past or back surgery.  He is s/p open AAA and iliac aneurysm repair 4/5/24 and is scheduled for AOIL doppler with shakila[facility] 10/7. Please see triage call from 9/12 re: same concerns.    Lidia and pt would like call back re: additional recommendations. Lidia #[phone], pt # [phone].    Answer Assessment - Initial Assessment Questions  1. ONSET: \"When did the pain start?\"       Pain RLE approx one month  2. LOCATION: \"Where is the pain located?\"       R ant thigh down calf to ankle, also radiates to back   3. PAIN: \"How bad is the pain?\"    (Scale 1-10; or mild, moderate, severe)    -  MILD (1-3): doesn't interfere with normal activities     -  MODERATE (4-7): interferes with normal activities (e.g., work or school) or awakens from sleep, limping     -  SEVERE (8-10): excruciating pain, unable to do any normal activities, unable to walk      Walking 10, rest 2-4  4. WORK OR EXERCISE: \"Has there been any recent work or exercise that involved this part of the body?\"       no  5. CAUSE: \"What do you think is causing the leg pain?\"      unsure  6. OTHER SYMPTOMS: \"Do you have any other symptoms?\" (e.g., chest pain, back pain, breathing difficulty, swelling, rash, fever, numbness, weakness)      Denies weakness, " but does have numbness.    Protocols used: Leg Pain-ADULT-[place]

## 2024-10-03 NOTE — TELEPHONE ENCOUNTER
Lidia PAZ from [employer] calling. States she just saw patient today and wanted to talk to clinical staff in regards to his amlodipine     Unable to reach CTS team member at this time.     Lidia asking for a call back in regards to patients medication.

## 2024-10-03 NOTE — TELEPHONE ENCOUNTER
Placed call to Lidia and relayed message from Dr. Truong -   An ACEi or ARB could be started but there is risk of worsened kidney function and higher potassium levels since he is at CKD stage 4 so amlodipine was chosen as a safer option. As he does not have a documented history of DM2, I would not necessarily choose ACEi/ARB and in the case of HTN alone, I do not have a preference for choice of medication as different antihypertensives work for everyone differently.   After speaking with Lidia - she stated after seeing pt and reviewing labs - pt CKD3b - just wanted to discuss with  so she can get a better understanding and if there was anything additional as to change in more recent labs that she wasn't able to see that pt is CKD 4 - she wanted to discuss more with Dr. Truong to learn more regarding the pt

## 2024-10-03 NOTE — TELEPHONE ENCOUNTER
Called and spoke with Lidia, nurse advocate at [employer], she saw that the doctor prescribed amlodipine 5mg daily at the patients last office visit. She was asking why an ACE inhibitor or ARB  would not be suitable for the patient rather than amlodipine?  Lidia asking for a phone call back #[phone].  Please advise

## 2024-10-07 NOTE — TELEPHONE ENCOUNTER
Lidia is calling to follow up on last weeks conversation with Rose Cooper. You can return her call to [phone].

## 2024-10-07 NOTE — TELEPHONE ENCOUNTER
Reviewed. AOIL with MICKI scheduled for today-will need to await results to make further recommendations. Please have him follow up in the office after to review study and re-evaluate.

## 2024-10-07 NOTE — TELEPHONE ENCOUNTER
Pt cx AOIL for insurance reasons. LVM to see if he would like to reschedule AOIL and schedule OV.
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
## 2024-10-09 ENCOUNTER — OFFICE VISIT (OUTPATIENT)
Dept: CARDIOLOGY CLINIC | Facility: CLINIC | Age: 59
End: 2024-10-09
Payer: COMMERCIAL

## 2024-10-09 VITALS
BODY MASS INDEX: 30.32 KG/M2 | DIASTOLIC BLOOD PRESSURE: 70 MMHG | HEART RATE: 58 BPM | WEIGHT: 211.8 LBS | OXYGEN SATURATION: 98 % | HEIGHT: 70 IN | SYSTOLIC BLOOD PRESSURE: 138 MMHG

## 2024-10-09 DIAGNOSIS — Z72.0 TOBACCO USE: ICD-10-CM

## 2024-10-09 DIAGNOSIS — M54.31 SCIATICA OF RIGHT SIDE: ICD-10-CM

## 2024-10-09 DIAGNOSIS — I71.43 INFRARENAL ABDOMINAL AORTIC ANEURYSM (AAA) WITHOUT RUPTURE (HCC): ICD-10-CM

## 2024-10-09 DIAGNOSIS — N18.32 STAGE 3B CHRONIC KIDNEY DISEASE (HCC): ICD-10-CM

## 2024-10-09 DIAGNOSIS — E78.2 MIXED HYPERLIPIDEMIA: ICD-10-CM

## 2024-10-09 DIAGNOSIS — R06.02 SHORTNESS OF BREATH: Primary | ICD-10-CM

## 2024-10-09 DIAGNOSIS — I10 PRIMARY HYPERTENSION: ICD-10-CM

## 2024-10-09 PROCEDURE — 99215 OFFICE O/P EST HI 40 MIN: CPT | Performed by: NURSE PRACTITIONER

## 2024-10-09 NOTE — ASSESSMENT & PLAN NOTE
Lab Results   Component Value Date    EGFR 32 09/20/2024    EGFR 33 05/01/2024    EGFR 31 04/11/2024    CREATININE 2.16 (H) 09/20/2024    CREATININE 2.12 (H) 05/01/2024    CREATININE 2.19 (H) 04/11/2024

## 2024-10-09 NOTE — ASSESSMENT & PLAN NOTE
AAA 7.5cm, 4/05/24 sp open aortoilia aneuryms repair 18 x 9 mm Dacron bifurcated grate arto -bi-iliac- repair with R-EIA, L-LINDA. By Dr Bety Wilson

## 2024-10-21 ENCOUNTER — TELEPHONE (OUTPATIENT)
Age: 59
End: 2024-10-21

## 2024-10-21 NOTE — TELEPHONE ENCOUNTER
Bp readings called in     10/6 -9:45pm   169/99  10/7 -9:48am   143/83  10/8  2:50pm    159/89  10/9- 10:30pm 181/102  10/10 8:30pm 175/103  10/11 12pm    161/90  10/12-945am  140/80  10/13- 12:50pm 157/92  10/14 12pm     156/88  10/15 930pm  135/80  10/16 10am     160/93  10/17 11am     165/91  10/18 12pm    155/82  10/19 11:15am 166/95  10/20 3:30 pm 161/91  10/21 1pm       141/83     Please advise.

## 2024-10-23 NOTE — TELEPHONE ENCOUNTER
Placed call to pt ,he is willing to start Nifedipine 30 mg daily - he is asking should he continue taking amlodipine along with the nifedipine - pt was advised to continue to take BP readings along with HR/Pulse

## 2024-10-24 DIAGNOSIS — I10 PRIMARY HYPERTENSION: Primary | ICD-10-CM

## 2024-10-24 RX ORDER — NIFEDIPINE 30 MG/1
30 TABLET, EXTENDED RELEASE ORAL DAILY
Qty: 90 TABLET | Refills: 1 | Status: SHIPPED | OUTPATIENT
Start: 2024-10-24

## 2024-10-24 NOTE — TELEPHONE ENCOUNTER
Placed call to pt - pt is to start taking nifedipine 30mg daily and amlodipine has been discontinued - pt understood and was at the pharmacy picking up medication as we spoke

## 2024-10-25 NOTE — TELEPHONE ENCOUNTER
Patient called asking when to take nifedipine. Made aware it is prescribed daily and to just make sure he takes it at the same time every day. Patient verbalized understanding.

## 2024-12-19 DIAGNOSIS — I10 PRIMARY HYPERTENSION: ICD-10-CM

## 2024-12-19 RX ORDER — NIFEDIPINE 60 MG/1
60 TABLET, EXTENDED RELEASE ORAL DAILY
Qty: 30 TABLET | Refills: 2 | Status: SHIPPED | OUTPATIENT
Start: 2024-12-19 | End: 2025-03-19

## 2024-12-20 ENCOUNTER — TELEPHONE (OUTPATIENT)
Dept: NEPHROLOGY | Facility: CLINIC | Age: 59
End: 2024-12-20

## 2024-12-20 NOTE — TELEPHONE ENCOUNTER
Placed call to pt to advise       I have refilled nifedipine XL 60mg daily. He should monitor BP and report readings if < 120/80 or > 140/90 persistently.     I made pt aware he is to only take 1 tablet (60mg total) daily - he used to take 2 tablets of 30 mg daily - he thanked for call so he knows there was a change - pt reported BP's as follows     12/1   1130am 161/88  62  430pm  176/101 72  7pm   179/102  76    12/5  1130am 154/84  56    12/6  1pm  153/84  61    12/8  6pm  155/84  65    12/12  3pm 173/96  66    12/15  1030pm  162/94  66    12/16  730pm  178/99  65

## 2025-01-06 ENCOUNTER — HOSPITAL ENCOUNTER (OUTPATIENT)
Dept: HOSPITAL 99 - CATH | Age: 60
Discharge: HOME | End: 2025-01-06
Payer: COMMERCIAL

## 2025-01-06 VITALS — SYSTOLIC BLOOD PRESSURE: 156 MMHG | DIASTOLIC BLOOD PRESSURE: 87 MMHG

## 2025-01-06 VITALS — SYSTOLIC BLOOD PRESSURE: 145 MMHG | DIASTOLIC BLOOD PRESSURE: 84 MMHG

## 2025-01-06 VITALS — SYSTOLIC BLOOD PRESSURE: 155 MMHG | DIASTOLIC BLOOD PRESSURE: 89 MMHG

## 2025-01-06 VITALS — DIASTOLIC BLOOD PRESSURE: 81 MMHG | SYSTOLIC BLOOD PRESSURE: 129 MMHG

## 2025-01-06 VITALS — DIASTOLIC BLOOD PRESSURE: 86 MMHG | SYSTOLIC BLOOD PRESSURE: 162 MMHG

## 2025-01-06 VITALS — DIASTOLIC BLOOD PRESSURE: 81 MMHG | SYSTOLIC BLOOD PRESSURE: 151 MMHG

## 2025-01-06 VITALS — OXYGEN SATURATION: 2 % | DIASTOLIC BLOOD PRESSURE: 83 MMHG | RESPIRATION RATE: 18 BRPM

## 2025-01-06 VITALS — DIASTOLIC BLOOD PRESSURE: 87 MMHG | SYSTOLIC BLOOD PRESSURE: 158 MMHG

## 2025-01-06 VITALS — SYSTOLIC BLOOD PRESSURE: 158 MMHG | DIASTOLIC BLOOD PRESSURE: 87 MMHG

## 2025-01-06 VITALS — DIASTOLIC BLOOD PRESSURE: 88 MMHG | SYSTOLIC BLOOD PRESSURE: 153 MMHG

## 2025-01-06 VITALS — BODY MASS INDEX: 32.9 KG/M2

## 2025-01-06 VITALS — SYSTOLIC BLOOD PRESSURE: 130 MMHG | DIASTOLIC BLOOD PRESSURE: 84 MMHG

## 2025-01-06 DIAGNOSIS — R06.02: ICD-10-CM

## 2025-01-06 DIAGNOSIS — R07.89: ICD-10-CM

## 2025-01-06 DIAGNOSIS — N18.9: ICD-10-CM

## 2025-01-06 DIAGNOSIS — I25.82: ICD-10-CM

## 2025-01-06 DIAGNOSIS — I25.84: ICD-10-CM

## 2025-01-06 DIAGNOSIS — I25.10: Primary | ICD-10-CM

## 2025-01-06 PROCEDURE — C1894 INTRO/SHEATH, NON-LASER: HCPCS

## 2025-01-06 RX ADMIN — SODIUM CHLORIDE 294 ML: 900 INJECTION, SOLUTION INTRAVENOUS at 09:55

## 2025-01-06 NOTE — ITS.CL.CATH
"Cath Lab - Catheterization"~"Cardiac Catheterization"~"Procedure Report: "~"CARDIAC CATHETERIZATION REPORT"~"Date of Procedure: 1/6/2025"~"Referring: Lex Vazquez M.D."~"INDICATION: Multiple coronary artery disease risk factors, chest discomfort/shortness of breath, abnormal stress echocardiogram."~"PROCEDURE:"~"1.  Left heart catheterization."~"2.  Coronary angiography."~"ACCESS:"~"6 [place] right radial artery."~"CATHETERS:"~"1.  5 [place] JR4."~"2.  5 [place] JL 3.5."~"HEMODYNAMIC DATA"~"Weight (kg): 		98.0"~"AO (s/d/x, mmHg): 		121/76/92"~"LV (s/x mmHg): 		121/15"~"LEFT VENTRICULOGRAPHY: 	Not performed."~"CORONARY ANGIOGRAPHY"~"Dominance: 		Right."~"Left Main: 			Normal size, bifurcating vessel.  There is no coronary artery disease."~"LAD: 			Normal size vessel giving rise to 1 significant diagonal.  The entire proximal and mid vessel is densely calcified.  There is a 80%, shelflike plaque in the proximal LAD, immediately proximal to the origin of the diagonal.  There is a "~"tapering, 90% lesion in the mid LAD."~"Ramus:			Congenitally absent."~"Circumflex: 		Large size, nondominant vessel giving rise to 2 obtuse marginals.  The entire proximal vessel is densely calcified.  There is an 80% lesion in the origin of the left circumflex.  OM1 is chronically totally occluded at its origin, "~"filling via retrograde collaterals from the diagonal.  There is a 70% lesion in the proximal margin of OM1."~"RCA: 			Normal size, dominant vessel.  The vessel is chronically totally occluded at its origin.  Collaterals are visible from the circumflex and distal LAD."~"INTERVENTION(S)"~"None."~"Closure Device: 		Vascular band."~"Radiation (mGy): 		283.38"~"DAP (cm2.Gy): 		21.5808"~"Fluoroscopy time (minutes):	2.3"~"Sedation time (minutes):	22"~"CONCLUSIONS"~"1.  Right dominant circulation with a chronic total occlusion of the proximal RCA, a 70% lesion in the proximal margin of OM1, an 80% lesion in the ostial circumflex, chronic total occlusion of OM1, and 80%, shelflike plaque in the proximal LAD and "~"a tapering, 90% lesion in the mid LAD."~"2.  Top normal filling pressures (LVEDP = 15 mmHg at 98.0 kg)."~"3.  Chronic kidney disease (baseline creatinine = 1.96).  Contrast volume = 25 mL."~"RECOMMENDATIONS:"~"1. Expectant management after cardiac catheterization via right radial approach."~"2. Limited weight bearing on the right wrist for one week."~"3.  Consultation with CT surgery regarding optimal revascularization strategy."~"4.  Aggressive secondary prevention with high-dose, high potency statin.  Goal LDL &lt;55."~"5.  Guideline directed medical therapy as hemodynamics tolerate."~"6.  Obtain echocardiogram from outpatient cardiologist versus repeat here at [facility]."~"Copy to: Lex Vazquez M.D., Natan Valdes D.MITALI."~"Abraham Lagunas DO, FACC, FACP"

## 2025-01-08 DIAGNOSIS — N40.1 BENIGN PROSTATIC HYPERPLASIA WITH NOCTURIA: ICD-10-CM

## 2025-01-08 DIAGNOSIS — R35.1 BENIGN PROSTATIC HYPERPLASIA WITH NOCTURIA: ICD-10-CM

## 2025-01-09 ENCOUNTER — TELEPHONE (OUTPATIENT)
Dept: NEPHROLOGY | Facility: CLINIC | Age: 60
End: 2025-01-09

## 2025-01-09 RX ORDER — TAMSULOSIN HYDROCHLORIDE 0.4 MG/1
0.8 CAPSULE ORAL
Qty: 180 CAPSULE | Refills: 1 | Status: SHIPPED | OUTPATIENT
Start: 2025-01-09

## 2025-01-09 NOTE — TELEPHONE ENCOUNTER
called and spoke with Ed. We need to Cancel your appointment on Friday 1/10 at 9:30 am due to Dr. Truong being ill, we will call back to reschedule.

## 2025-01-10 DIAGNOSIS — N40.1 BENIGN PROSTATIC HYPERPLASIA WITH NOCTURIA: ICD-10-CM

## 2025-01-10 DIAGNOSIS — R35.1 BENIGN PROSTATIC HYPERPLASIA WITH NOCTURIA: ICD-10-CM

## 2025-01-10 RX ORDER — OXYBUTYNIN CHLORIDE 10 MG/1
10 TABLET, EXTENDED RELEASE ORAL
Qty: 90 TABLET | Refills: 1 | Status: SHIPPED | OUTPATIENT
Start: 2025-01-10

## 2025-01-13 ENCOUNTER — TELEPHONE (OUTPATIENT)
Dept: NEPHROLOGY | Facility: CLINIC | Age: 60
End: 2025-01-13

## 2025-01-13 NOTE — TELEPHONE ENCOUNTER
----- Message from Jennifer Truong DO sent at 1/10/2025  3:52 PM EST -----  Regarding: provider illness cancellation  This patient was to see me in follow up this am. He does not have repeat labs since Sept. 2024.     I recommend repeat labs as already ordered Claire Wallace with f/u to be scheduled after labs obtained.     Thanks.

## 2025-01-13 NOTE — TELEPHONE ENCOUNTER
Placed call to pt and advised pt to have lab work completed and results will determine when Dr. Truong would like to see pt back in office for follow up - pt understood and stated can have lab work done today - I will fax pt lab orders over to [facility] in [place]

## 2025-01-15 LAB
CREAT ?TM UR-SCNC: 294.4 UMOL/L
EXT ALBUMIN URINE RANDOM: 56.4
MICROALBUMIN/CREAT UR: 19 MG/G{CREAT}

## 2025-01-16 LAB
ALBUMIN SERPL-MCNC: 4.1 G/DL (ref 3.5–5)
ALP SERPL-CCNC: 67 U/L (ref 38–126)
ALT SERPL-CCNC: 29 U/L (ref 0–50)
APTT PPP: 31.3 SEC (ref 23.4–35)
AST SERPL-CCNC: 24 U/L (ref 17–59)
BUN SERPL-MCNC: 19 MG/DL (ref 9–20)
CALCIUM SERPL-MCNC: 9 MG/DL (ref 8.4–10.2)
CHLORIDE SERPL-SCNC: 104 MMOL/L (ref 98–107)
CO2 SERPL-SCNC: 28 MMOL/L (ref 22–30)
EGFR: 35.59
ERYTHROCYTE [DISTWIDTH] IN BLOOD BY AUTOMATED COUNT: 14 % (ref 11.5–14.5)
ESTIMATED CREATININE CLEARANCE: 43 ML/MIN
GLUCOSE SERPL-MCNC: 99 MG/DL (ref 70–99)
HBA1C MFR BLD: 6.4 % (ref 4–5.6)
HCT VFR BLD AUTO: 40.9 % (ref 39–52)
HGB BLD-MCNC: 14.2 G/DL (ref 13–18)
INR PPP: 0.96
MCHC RBC AUTO-ENTMCNC: 34.7 G/DL (ref 33–37)
MCV RBC AUTO: 82.5 FL (ref 80–94)
NRBC BLD AUTO-RTO: 0 %
PLATELET # BLD AUTO: 251 10^3/UL (ref 130–400)
POTASSIUM SERPL-SCNC: 4.2 MMOL/L (ref 3.5–5.1)
PROT SERPL-MCNC: 7.1 G/DL (ref 6.3–8.2)
PROTHROMBIN TIME: 13.3 SEC (ref 11.4–14.6)
SODIUM SERPL-SCNC: 142 MMOL/L (ref 135–145)

## 2025-01-16 NOTE — CM
"Met with Mr. Rojo in [facility].  He states prior to admission he reside with his mother, (84) and his son,(32) in a two story home with one step to enter. He states he has a full flight of steps to get to bedroom/full bathroom.  He states he has a "~"powder room on the first floor. He states he spends most of his time in his bedroom. He states prior to admission he was independent with ambulation and adls.  He does not have any DME in the home. He states he has a prescription plan.  He states "~"his son maybe to assist in his care if needed. The discharge plan is to return home with his mother and son with a home visit by the Transitional Care Nurse when medically stable. "~"We reviewed pre-op and post-op routines. We reviewed the shower instructions.  He has the soap, written instructions and the Cardiothoracic Surgery Educational Booklet.  We also reviewed restrictions including sternal precautions and driving "~"restrictions.  We discussed a home visit by the Transitional Care Nurse. He is agreeable to a home visit.  The plan is for CABG on Friday, 01/31/2025. "

## 2025-01-17 ENCOUNTER — TELEPHONE (OUTPATIENT)
Age: 60
End: 2025-01-17

## 2025-01-17 NOTE — TELEPHONE ENCOUNTER
Patient is  having  open heart surgery Jan 31. Cardiothoracic call they  need to know  his last office visits  notes  labs any  images . They ask could  you  please  fax it over right  way.   Fax- [phone]  Office number -[phone]

## 2025-01-17 NOTE — TELEPHONE ENCOUNTER
[facility]    States they did not receive a fax.      Confirmed fax number is [phone]     Please refax to correct fax.

## 2025-01-17 NOTE — TELEPHONE ENCOUNTER
Placed call to Renee at [facility] at [phone] and re faxed to correct fax number at [phone].

## 2025-01-17 NOTE — TELEPHONE ENCOUNTER
Faxed last office note and most recent lab results to cardiothoracic office and sent request to [facility] for imaging records - contacted vascular surgery office and advised

## 2025-01-20 NOTE — TELEPHONE ENCOUNTER
Returned Renee's call and provided [facility] contact number so she can communicate the urgency of the records needed for pt upcoming procedure.

## 2025-01-24 ENCOUNTER — TELEPHONE (OUTPATIENT)
Dept: UROLOGY | Facility: CLINIC | Age: 60
End: 2025-01-24

## 2025-01-24 DIAGNOSIS — R35.1 BENIGN PROSTATIC HYPERPLASIA WITH NOCTURIA: ICD-10-CM

## 2025-01-24 DIAGNOSIS — N40.1 BENIGN PROSTATIC HYPERPLASIA WITH NOCTURIA: ICD-10-CM

## 2025-01-28 ENCOUNTER — HOSPITAL ENCOUNTER (OUTPATIENT)
Dept: HOSPITAL 99 - RAD | Age: 60
End: 2025-01-28
Payer: COMMERCIAL

## 2025-01-28 DIAGNOSIS — Z01.818: ICD-10-CM

## 2025-01-28 DIAGNOSIS — N40.1 BENIGN PROSTATIC HYPERPLASIA WITH NOCTURIA: ICD-10-CM

## 2025-01-28 DIAGNOSIS — R35.1 BENIGN PROSTATIC HYPERPLASIA WITH NOCTURIA: ICD-10-CM

## 2025-01-28 DIAGNOSIS — I25.10: Primary | ICD-10-CM

## 2025-01-28 PROCEDURE — 94727 GAS DIL/WSHOT DETER LNG VOL: CPT

## 2025-01-28 PROCEDURE — 94729 DIFFUSING CAPACITY: CPT

## 2025-01-28 RX ORDER — TAMSULOSIN HYDROCHLORIDE 0.4 MG/1
0.8 CAPSULE ORAL
Qty: 180 CAPSULE | Refills: 1 | Status: SHIPPED | OUTPATIENT
Start: 2025-01-28

## 2025-01-29 RX ORDER — OXYBUTYNIN CHLORIDE 10 MG/1
10 TABLET, EXTENDED RELEASE ORAL
Qty: 90 TABLET | Refills: 3 | Status: SHIPPED | OUTPATIENT
Start: 2025-01-29

## 2025-01-30 ENCOUNTER — HOSPITAL ENCOUNTER (OUTPATIENT)
Dept: NON INVASIVE DIAGNOSTICS | Age: 60
Discharge: HOME | End: 2025-01-30
Payer: COMMERCIAL

## 2025-01-30 DIAGNOSIS — I72.3 ILIAC ARTERY ANEURYSM, BILATERAL (HCC): ICD-10-CM

## 2025-01-30 DIAGNOSIS — I71.43 INFRARENAL ABDOMINAL AORTIC ANEURYSM (AAA) WITHOUT RUPTURE (HCC): ICD-10-CM

## 2025-01-30 PROCEDURE — 93978 VASCULAR STUDY: CPT

## 2025-01-30 PROCEDURE — 93923 UPR/LXTR ART STDY 3+ LVLS: CPT

## 2025-01-31 ENCOUNTER — HOSPITAL ENCOUNTER (INPATIENT)
Dept: HOSPITAL 99 - CVICU | Age: 60
LOS: 5 days | Discharge: HOME | DRG: 235 | End: 2025-02-05
Payer: COMMERCIAL

## 2025-01-31 VITALS — RESPIRATION RATE: 14 BRPM

## 2025-01-31 VITALS — DIASTOLIC BLOOD PRESSURE: 94 MMHG | RESPIRATION RATE: 17 BRPM | SYSTOLIC BLOOD PRESSURE: 150 MMHG

## 2025-01-31 VITALS — SYSTOLIC BLOOD PRESSURE: 112 MMHG | DIASTOLIC BLOOD PRESSURE: 79 MMHG | RESPIRATION RATE: 35 BRPM

## 2025-01-31 VITALS — DIASTOLIC BLOOD PRESSURE: 82 MMHG | SYSTOLIC BLOOD PRESSURE: 115 MMHG | RESPIRATION RATE: 20 BRPM

## 2025-01-31 VITALS — SYSTOLIC BLOOD PRESSURE: 135 MMHG | DIASTOLIC BLOOD PRESSURE: 91 MMHG | RESPIRATION RATE: 16 BRPM

## 2025-01-31 VITALS
BODY MASS INDEX: 32.2 KG/M2 | BODY MASS INDEX: 33.9 KG/M2 | BODY MASS INDEX: 33 KG/M2 | BODY MASS INDEX: 33.7 KG/M2 | BODY MASS INDEX: 31.9 KG/M2 | BODY MASS INDEX: 32.8 KG/M2 | BODY MASS INDEX: 32.6 KG/M2

## 2025-01-31 VITALS — RESPIRATION RATE: 21 BRPM | DIASTOLIC BLOOD PRESSURE: 80 MMHG | SYSTOLIC BLOOD PRESSURE: 120 MMHG

## 2025-01-31 VITALS — SYSTOLIC BLOOD PRESSURE: 107 MMHG | RESPIRATION RATE: 20 BRPM | DIASTOLIC BLOOD PRESSURE: 79 MMHG

## 2025-01-31 VITALS — DIASTOLIC BLOOD PRESSURE: 75 MMHG | RESPIRATION RATE: 26 BRPM | SYSTOLIC BLOOD PRESSURE: 92 MMHG

## 2025-01-31 VITALS — RESPIRATION RATE: 23 BRPM

## 2025-01-31 VITALS — RESPIRATION RATE: 30 BRPM | DIASTOLIC BLOOD PRESSURE: 77 MMHG | SYSTOLIC BLOOD PRESSURE: 104 MMHG

## 2025-01-31 VITALS — RESPIRATION RATE: 35 BRPM

## 2025-01-31 VITALS — SYSTOLIC BLOOD PRESSURE: 107 MMHG | DIASTOLIC BLOOD PRESSURE: 83 MMHG | RESPIRATION RATE: 30 BRPM

## 2025-01-31 VITALS — RESPIRATION RATE: 22 BRPM

## 2025-01-31 VITALS — DIASTOLIC BLOOD PRESSURE: 74 MMHG | RESPIRATION RATE: 24 BRPM | SYSTOLIC BLOOD PRESSURE: 128 MMHG

## 2025-01-31 VITALS — SYSTOLIC BLOOD PRESSURE: 126 MMHG | DIASTOLIC BLOOD PRESSURE: 86 MMHG | RESPIRATION RATE: 21 BRPM

## 2025-01-31 VITALS — DIASTOLIC BLOOD PRESSURE: 73 MMHG | RESPIRATION RATE: 26 BRPM | SYSTOLIC BLOOD PRESSURE: 93 MMHG

## 2025-01-31 VITALS — RESPIRATION RATE: 16 BRPM

## 2025-01-31 VITALS — RESPIRATION RATE: 14 BRPM | DIASTOLIC BLOOD PRESSURE: 71 MMHG | SYSTOLIC BLOOD PRESSURE: 120 MMHG

## 2025-01-31 VITALS — SYSTOLIC BLOOD PRESSURE: 124 MMHG | RESPIRATION RATE: 25 BRPM | DIASTOLIC BLOOD PRESSURE: 76 MMHG

## 2025-01-31 VITALS — SYSTOLIC BLOOD PRESSURE: 113 MMHG | RESPIRATION RATE: 30 BRPM | DIASTOLIC BLOOD PRESSURE: 73 MMHG

## 2025-01-31 VITALS — SYSTOLIC BLOOD PRESSURE: 101 MMHG | DIASTOLIC BLOOD PRESSURE: 71 MMHG | RESPIRATION RATE: 31 BRPM

## 2025-01-31 VITALS — RESPIRATION RATE: 27 BRPM

## 2025-01-31 VITALS — DIASTOLIC BLOOD PRESSURE: 80 MMHG | RESPIRATION RATE: 20 BRPM | SYSTOLIC BLOOD PRESSURE: 129 MMHG

## 2025-01-31 VITALS — SYSTOLIC BLOOD PRESSURE: 101 MMHG | DIASTOLIC BLOOD PRESSURE: 75 MMHG | RESPIRATION RATE: 27 BRPM

## 2025-01-31 VITALS — RESPIRATION RATE: 26 BRPM

## 2025-01-31 VITALS — RESPIRATION RATE: 32 BRPM

## 2025-01-31 VITALS — RESPIRATION RATE: 30 BRPM

## 2025-01-31 VITALS — RESPIRATION RATE: 21 BRPM

## 2025-01-31 VITALS — RESPIRATION RATE: 31 BRPM

## 2025-01-31 VITALS — RESPIRATION RATE: 15 BRPM | DIASTOLIC BLOOD PRESSURE: 75 MMHG | SYSTOLIC BLOOD PRESSURE: 134 MMHG

## 2025-01-31 VITALS — RESPIRATION RATE: 11 BRPM

## 2025-01-31 VITALS — DIASTOLIC BLOOD PRESSURE: 84 MMHG | RESPIRATION RATE: 16 BRPM | SYSTOLIC BLOOD PRESSURE: 140 MMHG

## 2025-01-31 VITALS — RESPIRATION RATE: 15 BRPM

## 2025-01-31 VITALS — RESPIRATION RATE: 17 BRPM

## 2025-01-31 VITALS — RESPIRATION RATE: 18 BRPM

## 2025-01-31 VITALS — RESPIRATION RATE: 19 BRPM

## 2025-01-31 VITALS — SYSTOLIC BLOOD PRESSURE: 108 MMHG | RESPIRATION RATE: 22 BRPM | DIASTOLIC BLOOD PRESSURE: 70 MMHG

## 2025-01-31 VITALS — RESPIRATION RATE: 24 BRPM

## 2025-01-31 VITALS — RESPIRATION RATE: 28 BRPM

## 2025-01-31 VITALS — RESPIRATION RATE: 25 BRPM

## 2025-01-31 DIAGNOSIS — R50.82: ICD-10-CM

## 2025-01-31 DIAGNOSIS — E78.5: ICD-10-CM

## 2025-01-31 DIAGNOSIS — F10.21: ICD-10-CM

## 2025-01-31 DIAGNOSIS — I71.40: ICD-10-CM

## 2025-01-31 DIAGNOSIS — Z87.820: ICD-10-CM

## 2025-01-31 DIAGNOSIS — J95.1: ICD-10-CM

## 2025-01-31 DIAGNOSIS — D69.59: ICD-10-CM

## 2025-01-31 DIAGNOSIS — I25.110: Primary | ICD-10-CM

## 2025-01-31 DIAGNOSIS — E87.70: ICD-10-CM

## 2025-01-31 DIAGNOSIS — I95.9: ICD-10-CM

## 2025-01-31 DIAGNOSIS — R42: ICD-10-CM

## 2025-01-31 DIAGNOSIS — I70.0: ICD-10-CM

## 2025-01-31 DIAGNOSIS — Z82.49: ICD-10-CM

## 2025-01-31 DIAGNOSIS — R00.1: ICD-10-CM

## 2025-01-31 DIAGNOSIS — Z79.82: ICD-10-CM
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
## 2025-01-31 DIAGNOSIS — J90: ICD-10-CM

## 2025-01-31 DIAGNOSIS — I45.10: ICD-10-CM

## 2025-01-31 DIAGNOSIS — I13.10: ICD-10-CM

## 2025-01-31 DIAGNOSIS — N40.0: ICD-10-CM

## 2025-01-31 DIAGNOSIS — E87.6: ICD-10-CM

## 2025-01-31 DIAGNOSIS — R73.03: ICD-10-CM

## 2025-01-31 DIAGNOSIS — J98.11: ICD-10-CM

## 2025-01-31 DIAGNOSIS — N18.32: ICD-10-CM

## 2025-01-31 DIAGNOSIS — E86.1: ICD-10-CM

## 2025-01-31 DIAGNOSIS — E66.811: ICD-10-CM

## 2025-01-31 DIAGNOSIS — D62: ICD-10-CM

## 2025-01-31 DIAGNOSIS — R60.0: ICD-10-CM

## 2025-01-31 DIAGNOSIS — F17.210: ICD-10-CM

## 2025-01-31 DIAGNOSIS — N17.9: ICD-10-CM

## 2025-01-31 DIAGNOSIS — I72.2: ICD-10-CM

## 2025-01-31 LAB
ACT BLD: 101 SECONDS (ref 82–134)
ACT BLD: 104 SECONDS (ref 82–134)
ACT BLD: 496 SECONDS (ref 82–134)
ACT BLD: 513 SECONDS (ref 82–134)
ACT BLD: 538 SECONDS (ref 82–134)
ACT BLD: 546 SECONDS (ref 82–134)
ACT BLD: 596 SECONDS (ref 82–134)
ACT BLD: 617 SECONDS (ref 82–134)
ACT BLD: 631 SECONDS (ref 82–134)
ACT BLD: 678 SECONDS (ref 82–134)
APTT PPP: 31.7 SEC (ref 23.4–35)
B.E. - POC: -0.6 MMOL/L
B.E. - POC: -0.9 MMOL/L
B.E. - POC: -1.5 MMOL/L
B.E. - POC: -2.4 MMOL/L
B.E. - POC: -4.9 MMOL/L
B.E. - POC: 0.3 MMOL/L
B.E. - POC: 1.3 MMOL/L
B.E. - POC: 1.9 MMOL/L
B.E. - POC: 3 MMOL/L
BASE EXCESS BLDA CALC-SCNC: -2.6 MMOL/L
BASE EXCESS BLDA CALC-SCNC: -3.3 MMOL/L
BASE EXCESS BLDA CALC-SCNC: -3.4 MMOL/L
BUN SERPL-MCNC: 19 MG/DL (ref 9–20)
BUN SERPL-MCNC: 22 MG/DL (ref 9–20)
CALCIUM SERPL-MCNC: 9 MG/DL (ref 8.4–10.2)
CHLORIDE SERPL-SCNC: 107 MMOL/L (ref 98–107)
CO2 SERPL-SCNC: 21 MMOL/L (ref 22–30)
EGFR: 37.74
ESTIMATED CREATININE CLEARANCE: 44 ML/MIN
ESTIMATED CREATININE CLEARANCE: 52 ML/MIN
GLUCOSE - POC: 107 MG/DL (ref 70–99)
GLUCOSE - POC: 109 MG/DL (ref 70–99)
GLUCOSE - POC: 123 MG/DL (ref 70–99)
GLUCOSE - POC: 131 MG/DL (ref 70–99)
GLUCOSE - POC: 131 MG/DL (ref 70–99)
GLUCOSE - POC: 140 MG/DL (ref 70–99)
GLUCOSE - POC: 140 MG/DL (ref 70–99)
GLUCOSE - POC: 154 MG/DL (ref 70–99)
GLUCOSE - POC: 161 MG/DL (ref 70–99)
GLUCOSE - POINT OF CARE: 121 MG/DL (ref 70–99)
GLUCOSE - POINT OF CARE: 122 MG/DL (ref 70–99)
GLUCOSE - POINT OF CARE: 124 MG/DL (ref 70–99)
GLUCOSE - POINT OF CARE: 128 MG/DL (ref 70–99)
GLUCOSE - POINT OF CARE: 130 MG/DL (ref 70–99)
GLUCOSE - POINT OF CARE: 86 MG/DL (ref 70–99)
GLUCOSE - POINT OF CARE: 96 MG/DL (ref 70–99)
GLUCOSE SERPL-MCNC: 113 MG/DL (ref 70–99)
GLUCOSE SERPL-MCNC: 95 MG/DL (ref 70–99)
HCO3 - POC: 21 MMOL/L (ref 21–28)
HCO3 - POC: 22 MMOL/L (ref 21–28)
HCO3 - POC: 24 MMOL/L (ref 21–28)
HCO3 - POC: 24 MMOL/L (ref 21–28)
HCO3 - POC: 25 MMOL/L (ref 21–28)
HCO3 - POC: 25 MMOL/L (ref 21–28)
HCO3 - POC: 26 MMOL/L (ref 21–28)
HCO3 - POC: 26 MMOL/L (ref 21–28)
HCO3 - POC: 28 MMOL/L (ref 21–28)
HCO3 BLDA-SCNC: 21.7 MMOL/L (ref 21–28)
HCO3 BLDA-SCNC: 22.1 MMOL/L (ref 21–28)
HCO3 BLDA-SCNC: 23.1 MMOL/L (ref 21–28)
HCT VFR BLD AUTO: 28.1 % (ref 39–52)
HCT VFR BLD AUTO: 29.4 % (ref 39–52)
HEMATOCRIT - POC: 25 % PCV (ref 42–52)
HEMATOCRIT - POC: 25 % PCV (ref 42–52)
HEMATOCRIT - POC: 26 % PCV (ref 42–52)
HEMATOCRIT - POC: 28 % PCV (ref 42–52)
HEMATOCRIT - POC: 29 % PCV (ref 42–52)
HEMATOCRIT - POC: 31 % PCV (ref 42–52)
HEMATOCRIT - POC: 31 % PCV (ref 42–52)
HEMOGLOBIN CALCULATED - POC: 10.4
HEMOGLOBIN CALCULATED - POC: 10.5
HEMOGLOBIN CALCULATED - POC: 8.5
HEMOGLOBIN CALCULATED - POC: 8.6
HEMOGLOBIN CALCULATED - POC: 9
HEMOGLOBIN CALCULATED - POC: 9.4
HEMOGLOBIN CALCULATED - POC: 9.5
HEMOGLOBIN CALCULATED - POC: 9.6
HEMOGLOBIN CALCULATED - POC: 9.8
HGB BLD-MCNC: 10 G/DL (ref 13–18)
HGB BLD-MCNC: 10.6 G/DL (ref 13–18)
INR PPP: 1.49
IONIZED CALCIUM - POC: 1.01 MMOL/L (ref 1.15–1.33)
IONIZED CALCIUM - POC: 1.01 MMOL/L (ref 1.15–1.33)
IONIZED CALCIUM - POC: 1.03 MMOL/L (ref 1.15–1.33)
IONIZED CALCIUM - POC: 1.04 MMOL/L (ref 1.15–1.33)
IONIZED CALCIUM - POC: 1.04 MMOL/L (ref 1.15–1.33)
IONIZED CALCIUM - POC: 1.05 MMOL/L (ref 1.15–1.33)
IONIZED CALCIUM - POC: 1.15 MMOL/L (ref 1.15–1.33)
IONIZED CALCIUM - POC: 1.23 MMOL/L (ref 1.15–1.33)
IONIZED CALCIUM - POC: 1.3 MMOL/L (ref 1.15–1.33)
LACTATE - POC: 0.6 MMOL/L (ref 0.36–0.75)
LACTATE - POC: 0.63 MMOL/L (ref 0.36–0.75)
LACTATE - POC: 0.79 MMOL/L (ref 0.36–0.75)
LACTATE - POC: 0.91 MMOL/L (ref 0.36–0.75)
LACTATE - POC: 1.07 MMOL/L (ref 0.36–0.75)
LACTATE - POC: 1.16 MMOL/L (ref 0.36–0.75)
LACTATE - POC: 1.52 MMOL/L (ref 0.36–0.75)
LACTATE - POC: 2.04 MMOL/L (ref 0.36–0.75)
LACTATE - POC: < 0.3 MMOL/L (ref 0.36–0.75)
MAGNESIUM SERPL-MCNC: 3.2 MG/DL (ref 1.6–2.3)
O2 SATURATION %CALCULATED-POC: 100 % (ref 94–98)
O2 SATURATION %CALCULATED-POC: 88.4 % (ref 94–98)
O2 SATURATION %CALCULATED-POC: 96.9 % (ref 94–98)
O2 SATURATION %CALCULATED-POC: 99.7 % (ref 94–98)
PCO2 - POC: 35 MMHG (ref 35–48)
PCO2 - POC: 37 MMHG (ref 35–48)
PCO2 - POC: 40 MMHG (ref 35–48)
PCO2 - POC: 40 MMHG (ref 35–48)
PCO2 - POC: 41 MMHG (ref 35–48)
PCO2 - POC: 41 MMHG (ref 35–48)
PCO2 - POC: 42 MMHG (ref 35–48)
PCO2 - POC: 45 MMHG (ref 35–48)
PCO2 - POC: 47 MMHG (ref 35–48)
PCO2 BLDA: 35 MMHG (ref 35–48)
PCO2 BLDA: 40 MMHG (ref 35–48)
PCO2 BLDA: 47 MMHG (ref 35–48)
PH - POC: 7.32 (ref 7.35–7.45)
PH - POC: 7.33 (ref 7.35–7.45)
PH - POC: 7.37 (ref 7.35–7.45)
PH - POC: 7.38 (ref 7.35–7.45)
PH - POC: 7.39 (ref 7.35–7.45)
PH - POC: 7.39 (ref 7.35–7.45)
PH - POC: 7.43 (ref 7.35–7.45)
PH - POC: 7.43 (ref 7.35–7.45)
PH - POC: 7.46 (ref 7.35–7.45)
PLATELET # BLD AUTO: 104 10^3/UL (ref 130–400)
PLATELET # BLD AUTO: 153 10^3/UL (ref 130–400)
PO2 - POC: 205 MMHG (ref 83–108)
PO2 - POC: 373 MMHG (ref 83–108)
PO2 - POC: 388 MMHG (ref 83–108)
PO2 - POC: 395 MMHG (ref 83–108)
PO2 - POC: 416 MMHG (ref 83–108)
PO2 - POC: 484 MMHG (ref 83–108)
PO2 - POC: 485 MMHG (ref 83–108)
PO2 - POC: 58 MMHG (ref 83–108)
PO2 - POC: 92 MMHG (ref 83–108)
PO2 BLDA: 73 MMHG (ref 83–108)
PO2 BLDA: 73 MMHG (ref 83–108)
PO2 BLDA: 82 MMHG (ref 83–108)
POTASSIUM - POC: 3.6 MMOL/L (ref 3.5–5.1)
POTASSIUM - POC: 3.7 MMOL/L (ref 3.5–5.1)
POTASSIUM - POC: 3.9 MMOL/L (ref 3.5–5.1)
POTASSIUM - POC: 4 MMOL/L (ref 3.5–5.1)
POTASSIUM - POC: 4.2 MMOL/L (ref 3.5–5.1)
POTASSIUM - POC: 4.4 MMOL/L (ref 3.5–5.1)
POTASSIUM - POC: 4.8 MMOL/L (ref 3.5–5.1)
POTASSIUM - POC: 4.8 MMOL/L (ref 3.5–5.1)
POTASSIUM - POC: 5.1 MMOL/L (ref 3.5–5.1)
POTASSIUM BLDA-SCNC: 4.1 MMOL/L (ref 3.5–5.1)
POTASSIUM BLDA-SCNC: 4.8 MMOL/L (ref 3.5–5.1)
POTASSIUM SERPL-SCNC: 3.9 MMOL/L (ref 3.5–5.1)
PROTHROMBIN TIME: 18.3 SEC (ref 11.4–14.6)
SAO2 % BLDA: 96 % (ref 94–98)
SAO2 % BLDA: 96.7 % (ref 94–98)
SAO2 % BLDA: 98 % (ref 94–98)
SODIUM - POC: 134 MMOL/L (ref 136–145)
SODIUM - POC: 139 MMOL/L (ref 136–145)
SODIUM - POC: 141 MMOL/L (ref 136–145)
SODIUM - POC: 142 MMOL/L (ref 136–145)
SODIUM - POC: 143 MMOL/L (ref 136–145)
SODIUM - POC: 146 MMOL/L (ref 136–145)
SODIUM - POC: 147 MMOL/L (ref 136–145)
SODIUM BLDA-SCNC: 139 MMOL/L (ref 136–145)
SODIUM BLDA-SCNC: 141 MMOL/L (ref 136–145)
SODIUM SERPL-SCNC: 142 MMOL/L (ref 135–145)
SPECIMEN PROCESSING COMMENT: (no result)
SQUAMOUS URNS QL MICRO: (no result) /LPF
TRANS CELLS UR QL COMP ASSIST: (no result) /LPF
URINE RED BLOOD CELL: (no result) /HPF (ref 0–2)
URINE WHITE CELL: (no result) /HPF (ref 0–5)

## 2025-01-31 PROCEDURE — 93922 UPR/L XTREMITY ART 2 LEVELS: CPT | Performed by: SURGERY

## 2025-01-31 PROCEDURE — 30233N1 TRANSFUSION OF NONAUTOLOGOUS RED BLOOD CELLS INTO PERIPHERAL VEIN, PERCUTANEOUS APPROACH: ICD-10-PCS | Performed by: THORACIC SURGERY (CARDIOTHORACIC VASCULAR SURGERY)

## 2025-01-31 PROCEDURE — 02100AW BYPASS CORONARY ARTERY, ONE ARTERY FROM AORTA WITH AUTOLOGOUS ARTERIAL TISSUE, OPEN APPROACH: ICD-10-PCS | Performed by: THORACIC SURGERY (CARDIOTHORACIC VASCULAR SURGERY)

## 2025-01-31 PROCEDURE — 021109W BYPASS CORONARY ARTERY, TWO ARTERIES FROM AORTA WITH AUTOLOGOUS VENOUS TISSUE, OPEN APPROACH: ICD-10-PCS | Performed by: THORACIC SURGERY (CARDIOTHORACIC VASCULAR SURGERY)

## 2025-01-31 PROCEDURE — P9045 ALBUMIN (HUMAN), 5%, 250 ML: HCPCS

## 2025-01-31 PROCEDURE — 06BP0ZZ EXCISION OF RIGHT SAPHENOUS VEIN, OPEN APPROACH: ICD-10-PCS | Performed by: THORACIC SURGERY (CARDIOTHORACIC VASCULAR SURGERY)

## 2025-01-31 PROCEDURE — B24BZZ4 ULTRASONOGRAPHY OF HEART WITH AORTA, TRANSESOPHAGEAL: ICD-10-PCS | Performed by: ANESTHESIOLOGY

## 2025-01-31 PROCEDURE — 02100ZC BYPASS CORONARY ARTERY, ONE ARTERY FROM THORACIC ARTERY, OPEN APPROACH: ICD-10-PCS | Performed by: THORACIC SURGERY (CARDIOTHORACIC VASCULAR SURGERY)

## 2025-01-31 PROCEDURE — 5A1221Z PERFORMANCE OF CARDIAC OUTPUT, CONTINUOUS: ICD-10-PCS | Performed by: THORACIC SURGERY (CARDIOTHORACIC VASCULAR SURGERY)

## 2025-01-31 PROCEDURE — 93978 VASCULAR STUDY: CPT | Performed by: SURGERY

## 2025-01-31 PROCEDURE — P9016 RBC LEUKOCYTES REDUCED: HCPCS

## 2025-01-31 PROCEDURE — 03BC4ZZ EXCISION OF LEFT RADIAL ARTERY, PERCUTANEOUS ENDOSCOPIC APPROACH: ICD-10-PCS | Performed by: THORACIC SURGERY (CARDIOTHORACIC VASCULAR SURGERY)

## 2025-01-31 RX ADMIN — INSULIN ASPART: 100 INJECTION, SOLUTION INTRAVENOUS; SUBCUTANEOUS at 15:37

## 2025-01-31 RX ADMIN — ALBUMIN (HUMAN) 250: 12.5 INJECTION, SOLUTION INTRAVENOUS at 16:47

## 2025-01-31 RX ADMIN — INSULIN ASPART: 100 INJECTION, SOLUTION INTRAVENOUS; SUBCUTANEOUS at 17:01

## 2025-01-31 RX ADMIN — ACETAMINOPHEN 100: 10 INJECTION INTRAVENOUS at 17:49

## 2025-01-31 RX ADMIN — OXYCODONE HYDROCHLORIDE 5 MG: 5 TABLET ORAL at 18:22

## 2025-01-31 RX ADMIN — SODIUM CHLORIDE 500: 900 INJECTION, SOLUTION INTRAVENOUS at 15:37

## 2025-01-31 RX ADMIN — Medication 500 MG: at 06:04

## 2025-01-31 RX ADMIN — CALCIUM GLUCONATE 100: 20 INJECTION, SOLUTION INTRAVENOUS at 21:25

## 2025-01-31 RX ADMIN — ACETAMINOPHEN 1000 MG: 500 TABLET ORAL at 21:51

## 2025-01-31 RX ADMIN — SODIUM CHLORIDE, SODIUM LACTATE, POTASSIUM CHLORIDE, AND CALCIUM CHLORIDE 250 ML: 600; 310; 30; 20 INJECTION, SOLUTION INTRAVENOUS at 16:48

## 2025-01-31 RX ADMIN — OXYBUTYNIN CHLORIDE 5 MG: 5 TABLET ORAL at 19:55

## 2025-01-31 RX ADMIN — MUPIROCIN 1 APPLIC: 20 OINTMENT TOPICAL at 06:03

## 2025-01-31 RX ADMIN — GABAPENTIN: 100 CAPSULE ORAL at 15:37

## 2025-01-31 RX ADMIN — CEFAZOLIN 5: 10 INJECTION, POWDER, FOR SOLUTION INTRAVENOUS at 19:53

## 2025-01-31 RX ADMIN — CEFAZOLIN 10: 10 INJECTION, POWDER, FOR SOLUTION INTRAVENOUS at 15:37

## 2025-01-31 RX ADMIN — SODIUM BICARBONATE 50 MEQ: 84 INJECTION INTRAVENOUS at 19:53

## 2025-01-31 RX ADMIN — SENNOSIDES AND DOCUSATE SODIUM 1 TABLET: 8.6; 5 TABLET ORAL at 19:53

## 2025-01-31 RX ADMIN — AMIODARONE HYDROCHLORIDE: 200 TABLET ORAL at 15:37

## 2025-01-31 RX ADMIN — HYDROMORPHONE HYDROCHLORIDE 0.25 MG: 0.25 INJECTION, SOLUTION INTRAMUSCULAR; INTRAVENOUS; SUBCUTANEOUS at 19:42

## 2025-01-31 RX ADMIN — PANTOPRAZOLE SODIUM 40 MG: 40 TABLET, DELAYED RELEASE ORAL at 06:04

## 2025-01-31 RX ADMIN — ACETAMINOPHEN: 500 TABLET ORAL at 15:36

## 2025-01-31 RX ADMIN — MUPIROCIN 1 APPLIC: 20 OINTMENT TOPICAL at 19:54

## 2025-01-31 RX ADMIN — MIDAZOLAM HYDROCHLORIDE 0.5 MG: 1 INJECTION, SOLUTION INTRAMUSCULAR; INTRAVENOUS at 15:20

## 2025-01-31 RX ADMIN — ATORVASTATIN CALCIUM: 40 TABLET, FILM COATED ORAL at 15:37

## 2025-01-31 RX ADMIN — GABAPENTIN: 100 CAPSULE ORAL at 17:01

## 2025-01-31 RX ADMIN — ASPIRIN 81 MG: 81 TABLET, CHEWABLE ORAL at 18:22

## 2025-01-31 RX ADMIN — TAMSULOSIN HYDROCHLORIDE: 0.4 CAPSULE ORAL at 15:37

## 2025-01-31 RX ADMIN — HYDROMORPHONE HYDROCHLORIDE 0.5 MG: 1 INJECTION, SOLUTION INTRAMUSCULAR; INTRAVENOUS; SUBCUTANEOUS at 18:22

## 2025-01-31 RX ADMIN — GABAPENTIN 100 MG: 100 CAPSULE ORAL at 21:51

## 2025-01-31 NOTE — PTCARENOTE
VSS. NSR on monitor HR 78, B/P 120/71. POX 96% 6L NC. Levo and VASO gtts on standby. PM care provided. Pt resting in bed. Will continue to monitor pt needs.

## 2025-01-31 NOTE — PTCARENOTE
"received pt from the CVOR into 2267, sinus rhythm on tele w epicardial V wire in place, + peripheral pulses, trace edema noted. Right radial melissa leveled and zeroed, /75, CVP via right IJ cordis 13. Lungs diminished, #8 ETT/24 right lip, pox "~"99% on SIMV 60%/ 500/16/+5 peep. CT x4 w red drainage, surgical sites a dressings intact, left arm w ACE wrap, + ulnar pulse. Routine post op EKG and labs obtained. Family at bedside, updated. "~"DRIPS: Levophed 2 mcg/min"~"	Precedex 0.5 mcg/kg/hr"~"	Insulin titrated per glycemic protocol  "

## 2025-01-31 NOTE — PTCARENOTE
[facility]    Verified 5 rights of medication     Depotestosterone 0.75 mls (150mg)  administered Into R glute      Future Appointments 4/21/2022 - 10/18/2022              Date Visit Type Length Department Provider     5/5/2022  8:15 AM NURSE ONLY 30 min RV NURSE RV RN VISITS                 Milla SEN, RN   [facility]          pt w soft BP w SBP in the 80's, MAP 60. Levophed titrated as ordered. LR bolus and albumin administered as ordered, vasopressin restarted at 0.02units/min per CT SAMANTHA. Precedex weaned off. Pt placed on CPAP wean by RT.

## 2025-01-31 NOTE — W.IMMPOSTOP
"Addendum entered and electronically signed by Sahil Jain MD  02/07/25 15:41: "~"[id number]"~"Original Note:"~"Surgical Immed Post Op Note"~"-"~"-: "~"CARDIAC SURGERY OPERATIVE NOTE:"~"Preoperative Dx:"~"	MVCAD"~"Postoperative Dx:"~"	Same"~"Procedures:"~"	1) Median sternotomy"~"	2) Endoscopic/open harvest/prep of RLE GSV"~"	3) Endoscopic harvest/prep of L RA"~"	4) CABG x 4 (CARMINE to LAD, GSV to D1, RA to OM1, GSV to OM2)"~"Surgeon:"~"	Sahil Jain M.D."~"Assistants:"~"	SELIN FisherASridevi-CSridevi; endoscopic/open harvest/prep of RLE GSV"~"	ALESSANDRA QuintanillaCSridevi; endoscopic harvest/prep of L RA; first assistant throughout"~"Anesthesia:"~"	Pasha Saez M.D."~"Perfusion:"~"	Sun Ryan, C.C.P.; XC: 102min; CPB: 162min"~"Findings:"~"	CARMINE was a sizable vessel w/ extremely brisk blood flow; ELD 3.00mm"~"	GSV was a healthy appearing conduit w/ ELD 3.50mm"~"	L RA was a healhty appearing conduit w/ ELD 3.00mm"~"	LAD was visible on the epicardial surface, dense scattered calcifications, ELD 2.50mm at midpoint anastomosis"~"	D was visible on the epicardial surface, healthy walls at midpoint anastomosis, ELD 2.50mm"~"	OM1 was intramyocardial under approximately 3mm of myocardium; scattered calcificaitons; ELD 2.25mm"~"	OM2 was visible on the epicardial surface, scattered calcificaitons, ELD 2.50mm"~"	RPDA/PLBs were all &lt; 1mm and not amendable to bypass"~"	MO: Postop LVEF 60%, normal valvular function; severe mobile atheroma noted in distal arch and descending thoracic aorta"~"	Excellent flow in all grafts on transit-time U/S flow probe assessment"~"Implants:"~"	CT x 4 (B/L pleural, inferior mediastinal, superior mediastinal)"~"	Sternal wires x 10"~"Complications:"~"	None"~"Transfusions:"~"	1U PRBC"~"Condition:"~"	Stable/guarded to CTICU"~"	75 isoelectric sinus (0.0/-0.3); 112/62; CVP 12"~"	GTTS: levophed 2, insulin 0.5, precedex 0.4, cardizem OFF (difficult to manage vasoplegia when ON)"

## 2025-01-31 NOTE — CM
Chart reviewed.  Patient is in the OR today.  Patient is independent of ADLS, lives with his mother and adult son in a 2 STH, 1 AVIVA, 0 DME.  Plan is for the patient to return home with CT Transitional RN.  CM to follow

## 2025-01-31 NOTE — W.PN.CD
"Today's Communication / Plan"~"-"~"-: "~"Routine post operative care."~"Wean vent/sedation to extubate."~"Wean pressors/inotropes to MAP &gt; 65 mmHg, CI &gt; 1.8 L/min/m2."~"Impression / Plan"~"-"~"-: "~"Impression/Plan:  58 y/o male with HTN, HLD, AAA s/p repair (April 2024), renal artery aneurysm, a history of pyelonephritis and multivessel CAD admitted for elective CABG."~"#Multivessel CAD"~"	-Chronic, progressive."~"	-S/P CABG x4 (LIMA to LAD, SVG to D1, LRA to OM1, SVG to OM2) with Dr. Jain, 1/31/2025 (POD #0)."~"	-Anticipate routine post operative management."~"	-Wean vent/sedation to extubate.  ABG = 7.30/47/82/23."~"	-Current gtts:"~"		"~"	-Wean pressors/inotropes for MAP &gt; 65 mmHg, CI &gt; 1.8 L/min/m2."~"	-Continue aspirin, atorvastatin (when taking PO)."~"	-We will restart OMT/GDMT when hemodynamics permit."~"#HTN"~"	-Chronic, currently on BP support post op."~"	-Resume antihypertensives when appropriate."~"#HLD"~"	-Chronic, stable."~"	-Continue atorvastatin 40 mg daily. "~"	-Goal LDL &lt; 55."~"Critical Care Time = 32 minutes."~"Subjective/Interval History:"~"CABG today."~"Received one unit of PRBCs in OR."~"DATA:"~"Cardiac Catheterization, 1/6/2025:"~"CONCLUSIONS"~"1.  Right dominant circulation with a chronic total occlusion of the proximal RCA, a 70% lesion in the proximal margin of OM1, an 80% lesion in the ostial circumflex, chronic total occlusion of OM1, and 80%, shelflike plaque in the proximal LAD and "~"a tapering, 90% lesion in the mid LAD."~"2.  Top normal filling pressures (LVEDP = 15 mmHg at 98.0 kg)."~"3.  Chronic kidney disease (baseline creatinine = 1.96).  Contrast volume = 25 mL."~"PFTs, 1/16/2025:"~"Impression:"~"No evidence of obstruction.  Mild restrictive lung defect.  Mild gas exchange capacity which is normal when accounting for alveolar volume involved in gas exchange.   "~"Intraprocedural MO, 1/31/2025:"~"CONCLUSIONS"~" Overall LVEF is approximately 60% with no RWMA."~" Moderate concentric left ventricular hypertrophy. "~" All valves appear to be functioning normally."~" Severe mobile atheroma seen in the descending aorta and distal aortic arch."~" The proximal ascending aorta shows calcification from the ST junction to 1-2 cm "~" distal anteriorly."~"POST OPERATIVE FINDINGS"~" The patient underwent a CABG on bypass.  Postop rhythm remains sinus.  RV and "~" LV function are normal.  Overall LVEF is approximately 60% with no new RWMA.  "~" Mild TR.  AV and PV function appear normal.  Trace MR.  Aortic scan is "~" unchanged.  Mobile atheroma are still seen in the descending aorta and distal "~" arch."~"Physical Exam"~"Vital Signs/Labs"~"-: "~"Vital Signs"~"Temp	Pulse	Resp	BP	Pulse Ox"~" 36.6 C 	 79 	 16 	 135/91 	 99 "~" 01/31/25 15:00	 01/31/25 15:20	 01/31/25 15:20	 01/31/25 15:05	 01/31/25 15:20"~"	01/30/25	01/31/25	02/01/25"~"	11:59	11:59	11:59"~"Actual Weight		95 kg	"~"PT	 13.3 Sec (11.4-14.6)  	01/16/25  12:30    "~"INR	 0.96  	01/16/25  12:30    "~"APTT	 31.3 Sec (23.4-35.0)  	01/16/25  12:30    "~"Physical Exam"~"Constitutional: No acute distress and Comfortable"~"EENT: Anicteric and Moist mucous membranes"~"Cardiovascular: Rhythm & rate is regular, Pedal edema is absent, JVD pressure is normal, S1S2 is normal and Rub present"~"Respiratory: Respiratory effort normal, Lungs clear to auscul., Wheeze Absent, Crackles Absent and Rhonchi Present"~"GI: Soft, Distention absent, Flat, Non tender and Normal bowel sounds"~"Neuro/Psych: Other (Intubated/sedated.)"~"Data Reviewed"~"-"~"-: "~"Date of Service:  January 31, 2025"~"Medical Decision Making: Reviewed Test Results, Test Interpretation and Review of Case with other Provider"~"EKG: Tracing Personally Visualized and interpreted and Report Reviewed by me"~"X-Ray/CT/US/MRI/NUC/PET: Image Personally Visualized and interpreted and Report Reviewed by me"~"Medical Tests (PFT, Pathology etc): Image Personally Visualized and interpreted and Report Reviewed by me"~"Labs: Labs Reviewed by me"~"Old Records: Reviewed"

## 2025-01-31 NOTE — W.CVOR.SURPR
"CVOR Surgeon Immed Pre Op"~"-"~"-: "~"I have examined this patient prior to performance of the scheduled procedure. "~"The patient's condition is unchanged from the time of the dictated/written History and "~"Physical and the patient is able to undergo the scheduled procedure. "~"  "

## 2025-01-31 NOTE — PTCARENOTE
"Pt admitted to CVICU at 0502. Pt weighed, VS done. Pt clipped/prepped per CVOR protocol. CHG bath given. Pre op meds given. Talked with Nithin VELASQUEZ regarding HR 53. Pt took carvedilol 25 mg last evening (scheduled med). Metoprolol held per PA. Chemistry "~"done stat on arrival to CVICU. Dr. Jain with results. Dr. Jain at bedside to talk with pt and mother this am. Pt sent to [facility]OR with 2 RNs this am at 0645."

## 2025-01-31 NOTE — PTCARENOTE
"Received pt from dayshift, walking rounds completed. Pt assessment completed in bed. Pt on Levo @4 and Insulin at 1.5. Pt has hx. of TBI. Oriented to name, , and location. Strength equal bilaterally throughout. Pupils equal and reactive to 2. NSR "~"on monitor. HR 70's, B/P 108/70. V-wires intact, not on. + sternal rub. R radial A-line zeroed and calibrated. R radial pulse palpable, L ulnar pulse heard with doppler, R DP/PT pulse weak, L DP/PT pulses palpable. bilateral lower extremities trace "~"edema. Lungs diminished, coarse throughout. 6L NC POX 96%. 2 Meds, R/L Pleural C/T intacts, -20 cm suction. No tidaling or crepitus. All draining red fluid WNL. I/S: 500, pt encouraged to use. Kirby intact, draining clear, yellow fluid. Hypoactive "~"BS. Abdomen round, Soft, slightly tender to touch. Discussed plan of care with pt. Discussed pain management with pt. Pt agrees to plan. Will continue to monitor pt needs."

## 2025-02-01 VITALS — DIASTOLIC BLOOD PRESSURE: 69 MMHG | RESPIRATION RATE: 18 BRPM | SYSTOLIC BLOOD PRESSURE: 104 MMHG

## 2025-02-01 VITALS — SYSTOLIC BLOOD PRESSURE: 120 MMHG | DIASTOLIC BLOOD PRESSURE: 76 MMHG | RESPIRATION RATE: 18 BRPM

## 2025-02-01 VITALS — RESPIRATION RATE: 17 BRPM

## 2025-02-01 VITALS — DIASTOLIC BLOOD PRESSURE: 83 MMHG | SYSTOLIC BLOOD PRESSURE: 140 MMHG | RESPIRATION RATE: 20 BRPM

## 2025-02-01 VITALS — SYSTOLIC BLOOD PRESSURE: 118 MMHG | DIASTOLIC BLOOD PRESSURE: 66 MMHG | RESPIRATION RATE: 22 BRPM

## 2025-02-01 VITALS — RESPIRATION RATE: 13 BRPM

## 2025-02-01 VITALS — DIASTOLIC BLOOD PRESSURE: 80 MMHG | RESPIRATION RATE: 22 BRPM | SYSTOLIC BLOOD PRESSURE: 109 MMHG

## 2025-02-01 VITALS — RESPIRATION RATE: 16 BRPM

## 2025-02-01 VITALS — SYSTOLIC BLOOD PRESSURE: 132 MMHG | DIASTOLIC BLOOD PRESSURE: 75 MMHG

## 2025-02-01 VITALS — RESPIRATION RATE: 14 BRPM

## 2025-02-01 VITALS — RESPIRATION RATE: 21 BRPM | DIASTOLIC BLOOD PRESSURE: 77 MMHG | SYSTOLIC BLOOD PRESSURE: 109 MMHG

## 2025-02-01 VITALS — DIASTOLIC BLOOD PRESSURE: 78 MMHG | SYSTOLIC BLOOD PRESSURE: 112 MMHG | RESPIRATION RATE: 16 BRPM

## 2025-02-01 VITALS — SYSTOLIC BLOOD PRESSURE: 78 MMHG | DIASTOLIC BLOOD PRESSURE: 68 MMHG

## 2025-02-01 VITALS — RESPIRATION RATE: 18 BRPM | SYSTOLIC BLOOD PRESSURE: 117 MMHG | DIASTOLIC BLOOD PRESSURE: 81 MMHG

## 2025-02-01 VITALS — RESPIRATION RATE: 15 BRPM

## 2025-02-01 VITALS — RESPIRATION RATE: 16 BRPM | DIASTOLIC BLOOD PRESSURE: 79 MMHG | SYSTOLIC BLOOD PRESSURE: 101 MMHG

## 2025-02-01 VITALS — SYSTOLIC BLOOD PRESSURE: 114 MMHG | OXYGEN SATURATION: 96 % | DIASTOLIC BLOOD PRESSURE: 89 MMHG | HEART RATE: 79 BPM

## 2025-02-01 VITALS — SYSTOLIC BLOOD PRESSURE: 102 MMHG | DIASTOLIC BLOOD PRESSURE: 67 MMHG

## 2025-02-01 VITALS — DIASTOLIC BLOOD PRESSURE: 86 MMHG | SYSTOLIC BLOOD PRESSURE: 150 MMHG

## 2025-02-01 VITALS — DIASTOLIC BLOOD PRESSURE: 64 MMHG | SYSTOLIC BLOOD PRESSURE: 97 MMHG

## 2025-02-01 VITALS — SYSTOLIC BLOOD PRESSURE: 154 MMHG | DIASTOLIC BLOOD PRESSURE: 92 MMHG

## 2025-02-01 VITALS — SYSTOLIC BLOOD PRESSURE: 114 MMHG | DIASTOLIC BLOOD PRESSURE: 89 MMHG

## 2025-02-01 VITALS — SYSTOLIC BLOOD PRESSURE: 110 MMHG | DIASTOLIC BLOOD PRESSURE: 78 MMHG

## 2025-02-01 VITALS — RESPIRATION RATE: 16 BRPM | SYSTOLIC BLOOD PRESSURE: 97 MMHG | DIASTOLIC BLOOD PRESSURE: 87 MMHG

## 2025-02-01 VITALS — RESPIRATION RATE: 23 BRPM

## 2025-02-01 VITALS — SYSTOLIC BLOOD PRESSURE: 106 MMHG | DIASTOLIC BLOOD PRESSURE: 77 MMHG

## 2025-02-01 VITALS — RESPIRATION RATE: 19 BRPM

## 2025-02-01 VITALS — RESPIRATION RATE: 22 BRPM | DIASTOLIC BLOOD PRESSURE: 78 MMHG | SYSTOLIC BLOOD PRESSURE: 115 MMHG

## 2025-02-01 VITALS — RESPIRATION RATE: 22 BRPM

## 2025-02-01 VITALS — RESPIRATION RATE: 25 BRPM

## 2025-02-01 VITALS — DIASTOLIC BLOOD PRESSURE: 77 MMHG | SYSTOLIC BLOOD PRESSURE: 112 MMHG

## 2025-02-01 VITALS — DIASTOLIC BLOOD PRESSURE: 71 MMHG | SYSTOLIC BLOOD PRESSURE: 92 MMHG

## 2025-02-01 VITALS — DIASTOLIC BLOOD PRESSURE: 76 MMHG | SYSTOLIC BLOOD PRESSURE: 102 MMHG

## 2025-02-01 VITALS — RESPIRATION RATE: 20 BRPM

## 2025-02-01 VITALS — SYSTOLIC BLOOD PRESSURE: 153 MMHG | DIASTOLIC BLOOD PRESSURE: 91 MMHG

## 2025-02-01 LAB
BUN SERPL-MCNC: 28 MG/DL (ref 9–20)
CALCIUM SERPL-MCNC: 8.5 MG/DL (ref 8.4–10.2)
CHLORIDE SERPL-SCNC: 108 MMOL/L (ref 98–107)
CO2 SERPL-SCNC: 23 MMOL/L (ref 22–30)
EGFR: 30.32
ERYTHROCYTE [DISTWIDTH] IN BLOOD BY AUTOMATED COUNT: 13.9 % (ref 11.5–14.5)
ESTIMATED CREATININE CLEARANCE: 37 ML/MIN
GLUCOSE - POINT OF CARE: 102 MG/DL (ref 70–99)
GLUCOSE - POINT OF CARE: 107 MG/DL (ref 70–99)
GLUCOSE - POINT OF CARE: 112 MG/DL (ref 70–99)
GLUCOSE - POINT OF CARE: 119 MG/DL (ref 70–99)
GLUCOSE - POINT OF CARE: 120 MG/DL (ref 70–99)
GLUCOSE - POINT OF CARE: 127 MG/DL (ref 70–99)
GLUCOSE - POINT OF CARE: 98 MG/DL (ref 70–99)
GLUCOSE SERPL-MCNC: 108 MG/DL (ref 70–99)
HCT VFR BLD AUTO: 27.2 % (ref 39–52)
HGB BLD-MCNC: 9.7 G/DL (ref 13–18)
MAGNESIUM SERPL-MCNC: 2.9 MG/DL (ref 1.6–2.3)
MCHC RBC AUTO-ENTMCNC: 35.7 G/DL (ref 33–37)
MCV RBC AUTO: 82.2 FL (ref 80–94)
PLATELET # BLD AUTO: 152 10^3/UL (ref 130–400)
POTASSIUM SERPL-SCNC: 4.1 MMOL/L (ref 3.5–5.1)
SODIUM SERPL-SCNC: 143 MMOL/L (ref 135–145)
SQUAMOUS URNS QL MICRO: (no result) /LPF
URINE RED BLOOD CELL: (no result) /HPF (ref 0–2)
URINE WHITE CELL: (no result) /HPF (ref 0–5)

## 2025-02-01 RX ADMIN — INSULIN ASPART 4 UNITS: 100 INJECTION, SOLUTION INTRAVENOUS; SUBCUTANEOUS at 12:00

## 2025-02-01 RX ADMIN — MULTIVITAMIN TABLET 1 TABLET: TABLET at 08:38

## 2025-02-01 RX ADMIN — HYDROMORPHONE HYDROCHLORIDE 0.5 MG: 1 INJECTION, SOLUTION INTRAMUSCULAR; INTRAVENOUS; SUBCUTANEOUS at 12:23

## 2025-02-01 RX ADMIN — AMIODARONE HYDROCHLORIDE 200 MG: 200 TABLET ORAL at 16:39

## 2025-02-01 RX ADMIN — GABAPENTIN 100 MG: 100 CAPSULE ORAL at 22:14

## 2025-02-01 RX ADMIN — TAMSULOSIN HYDROCHLORIDE 0.8 MG: 0.4 CAPSULE ORAL at 08:38

## 2025-02-01 RX ADMIN — LIDOCAINE 1 PATCH: 4 PATCH TOPICAL at 08:39

## 2025-02-01 RX ADMIN — SENNOSIDES AND DOCUSATE SODIUM 1 TABLET: 8.6; 5 TABLET ORAL at 20:46

## 2025-02-01 RX ADMIN — CYCLOBENZAPRINE HYDROCHLORIDE 5 MG: 10 TABLET, FILM COATED ORAL at 14:03

## 2025-02-01 RX ADMIN — OXYBUTYNIN CHLORIDE 5 MG: 5 TABLET ORAL at 08:38

## 2025-02-01 RX ADMIN — CALCIUM GLUCONATE 100: 20 INJECTION, SOLUTION INTRAVENOUS at 06:12

## 2025-02-01 RX ADMIN — OXYCODONE HYDROCHLORIDE 5 MG: 5 TABLET ORAL at 12:00

## 2025-02-01 RX ADMIN — PANTOPRAZOLE SODIUM 40 MG: 40 TABLET, DELAYED RELEASE ORAL at 08:38

## 2025-02-01 RX ADMIN — HYDROMORPHONE HYDROCHLORIDE 0.5 MG: 1 INJECTION, SOLUTION INTRAMUSCULAR; INTRAVENOUS; SUBCUTANEOUS at 16:40

## 2025-02-01 RX ADMIN — METOPROLOL TARTRATE 12.5 MG: 25 TABLET, FILM COATED ORAL at 20:47

## 2025-02-01 RX ADMIN — ATORVASTATIN CALCIUM 40 MG: 40 TABLET, FILM COATED ORAL at 08:38

## 2025-02-01 RX ADMIN — MUPIROCIN 1 APPLIC: 20 OINTMENT TOPICAL at 21:06

## 2025-02-01 RX ADMIN — ASPIRIN 81 MG: 81 TABLET, CHEWABLE ORAL at 08:38

## 2025-02-01 RX ADMIN — AMIODARONE HYDROCHLORIDE 200 MG: 200 TABLET ORAL at 08:38

## 2025-02-01 RX ADMIN — GABAPENTIN 100 MG: 100 CAPSULE ORAL at 16:39

## 2025-02-01 RX ADMIN — OXYCODONE HYDROCHLORIDE 5 MG: 5 TABLET ORAL at 20:46

## 2025-02-01 RX ADMIN — OXYBUTYNIN CHLORIDE: 5 TABLET ORAL at 21:07

## 2025-02-01 RX ADMIN — ACETAMINOPHEN 1000 MG: 500 TABLET ORAL at 22:14

## 2025-02-01 RX ADMIN — ACETAMINOPHEN 1000 MG: 500 TABLET ORAL at 14:03

## 2025-02-01 RX ADMIN — AMLODIPINE BESYLATE 2.5 MG: 2.5 TABLET ORAL at 08:38

## 2025-02-01 RX ADMIN — AMIODARONE HYDROCHLORIDE 200 MG: 200 TABLET ORAL at 22:15

## 2025-02-01 RX ADMIN — INSULIN ASPART 4 UNITS: 100 INJECTION, SOLUTION INTRAVENOUS; SUBCUTANEOUS at 08:40

## 2025-02-01 RX ADMIN — HYDROMORPHONE HYDROCHLORIDE 0.25 MG: 0.25 INJECTION, SOLUTION INTRAMUSCULAR; INTRAVENOUS; SUBCUTANEOUS at 05:29

## 2025-02-01 RX ADMIN — OXYCODONE HYDROCHLORIDE 5 MG: 5 TABLET ORAL at 16:39

## 2025-02-01 RX ADMIN — GABAPENTIN 100 MG: 100 CAPSULE ORAL at 08:38

## 2025-02-01 RX ADMIN — SODIUM CHLORIDE: 900 INJECTION, SOLUTION INTRAVENOUS at 14:03

## 2025-02-01 RX ADMIN — CEFAZOLIN 5: 10 INJECTION, POWDER, FOR SOLUTION INTRAVENOUS at 12:00

## 2025-02-01 RX ADMIN — CLOPIDOGREL BISULFATE 75 MG: 75 TABLET ORAL at 08:38

## 2025-02-01 RX ADMIN — ACETAMINOPHEN 1000 MG: 500 TABLET ORAL at 05:15

## 2025-02-01 RX ADMIN — CEFAZOLIN 5: 10 INJECTION, POWDER, FOR SOLUTION INTRAVENOUS at 04:21

## 2025-02-01 RX ADMIN — MUPIROCIN 1 APPLIC: 20 OINTMENT TOPICAL at 08:38

## 2025-02-01 RX ADMIN — METOPROLOL TARTRATE 12.5 MG: 25 TABLET, FILM COATED ORAL at 08:38

## 2025-02-01 RX ADMIN — OXYBUTYNIN CHLORIDE 5 MG: 5 TABLET ORAL at 22:14

## 2025-02-01 RX ADMIN — SENNOSIDES AND DOCUSATE SODIUM 1 TABLET: 8.6; 5 TABLET ORAL at 08:38

## 2025-02-01 NOTE — W.PN.UPDATE
"Update Note"~"Progress Note Update"~"-: "~"Patient noted to have right upper extremity edema with Tmax of one 1.5 Fahrenheit overnight.  Surveillance ultrasound of right upper extremity and bilateral lower extremities reported no DVT.  Creatinine 2.4 with base line of 2.1 and noted clear "~"yellow urine.  Insulin infusion was discontinued per protocol and patient transferred to CVICU telemetry status.  Norvasc was initiated for radial patency will continue for 3 months postoperatively."

## 2025-02-01 NOTE — PTCARENOTE
"Received pt from dayshift, walking rounds completed. Pt assessment completed in bed. Oriented to name, , and location. Strength equal bilaterally throughout. NSR on monitor. HR 70's, B/P 150/86. V-wires insulated,  + sternal rub. R radial pulse "~"palpable, L ulnar pulse palpable, bilateral lower extremities pulse palpable. generalized edema throughout. Lungs diminished, coarse throughout. 6L NC POX 91%. 2 Meds, R/L Pleural C/T intacts, -20 cm suction. No tidaling or crepitus. All draining "~"red fluid WNL. I/S: 500, pt encouraged to use. Kirby intact, draining clear, yellow fluid. NBS. Abdomen round, Soft, slightly tender to touch. Discussed plan of care with pt. Discussed pain management with pt. Pt agrees to plan. Will continue to "~"monitor pt needs."
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
## 2025-02-01 NOTE — PTCARENOTE
VSS, sinus rhythm maintained on tele. Assisted from chair to bed, medicated for pain. Minimal output from CTs.

## 2025-02-01 NOTE — PTCARENOTE
"assumed care of pt from previous shift RN, pt w history of TBI, strange affect at baseline. sinus rhythm on tele w HR 74, /67, + peripheral pulses, +1 edema to bilateral upper and lower extremities. Lungs diminished d/t poor effort, coughing "~"and deep breathing encouraged. pox 89% on 4L NC. +bs, tolerating PO intake. Kirby catheter draining oly. CT x4 w minimal amount of drainage. Sternal aquacell dressing intact. Left arm w ace wrap, + left ulnar pulse, ACE wrap to right leg "~"maintained. Insulin gtt titrated per glycemic protocol. "

## 2025-02-01 NOTE — PTCARENOTE
VSS. NSR on monitor. HR 75, B/P 120/65. POX: 94% on 4LNC. Morning labs obtained and sent. ECG performed. Am care provided. Pt resting in bed. Will continue to monitor pt needs.

## 2025-02-01 NOTE — PTCARENOTE
VSS. NSR on monitor. HR 70's, B/P 154/92. POX 92% 6L NC. PM care provided. Pt resting in bed. Will continue to monitor pt needs.

## 2025-02-01 NOTE — W.PN.ANS.POP
"Anesthesia Post Operative"~"- Anesthesia Post Op Note"~"Vital Signs Stable-See Nursing Note: Yes"~"Airway Patent: Yes"~"Adequate Pain Control: Yes"~"Change in Mental Status: No"~"Current Postoperative Nausea & Vomiting: No"~"Anesthesia Complications: No"~"General Anesthetic Recall: No"~"Unplanned Admission: No"~"Post Op Hydration Adequate: Yes"
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
## 2025-02-01 NOTE — W.PN.CD
"Today's Communication / Plan"~"-"~"-: "~"Hold ambulation."~"Chest tube/pain management per CT surgery."~"Check UA."~"Check LE/UE duplex."~"Continue cefazolin."~"Impression / Plan"~"-"~"-: "~"Impression/Plan:  58 y/o male with HTN, HLD, AAA s/p repair (April 2024), renal artery aneurysm, CKD3 (baseline Cr 2.0), a history of pyelonephritis and multivessel CAD admitted for elective CABG."~"#Multivessel CAD"~"	-Chronic, progressive."~"	-S/P CABG x4 (LIMA to LAD, SVG to D1, LRA to OM1, SVG to OM2) with Dr. Jain, 1/31/2025 (POD #0)."~"	-Incentive spirometry."~"	-Hold ambulation given hypotension this morning - allow vasoplegia to resolve."~"	-Pain/chest tube management per CT surgery."~"#Fever"~"	-Acute."~"	-CXR does not show any evidence of PNA."~"	-Check UA (UTI), LE/UE duplex given edema of RUE (DVT/VTE)."~"	-Patient has been given cefazolin IV."~"	-Encourage incentive spirometry (atelectasis)"~"#HTN"~"	-Chronic, currently on BP support post op."~"	-Resume antihypertensives when appropriate."~"#HLD"~"	-Chronic, stable."~"	-Continue atorvastatin 40 mg daily. "~"	-Goal LDL &lt; 55."~"Critical Care Time = 35 minutes."~"Subjective/Interval History:"~"Extubated to 6LNC on 1/31/2025 @ 17:50."~"BP stable, but hypotensive when getting out of bed this morning (78/68).  Placed back in bed."~"Weight up 6.2 kg (95 kg --&gt; 101.2 kg)."~"Febrile overnight (Tmax = 38.6, Tcurrent = 37.9)."~"He reports some RUE edema/discomfort."~"DATA:"~"Cardiac Catheterization, 1/6/2025:"~"CONCLUSIONS"~"1.  Right dominant circulation with a chronic total occlusion of the proximal RCA, a 70% lesion in the proximal margin of OM1, an 80% lesion in the ostial circumflex, chronic total occlusion of OM1, and 80%, shelflike plaque in the proximal LAD and "~"a tapering, 90% lesion in the mid LAD."~"2.  Top normal filling pressures (LVEDP = 15 mmHg at 98.0 kg)."~"3.  Chronic kidney disease (baseline creatinine = 1.96).  Contrast volume = 25 mL."~"PFTs, 1/16/2025:"~"Impression:"~"No evidence of obstruction.  Mild restrictive lung defect.  Mild gas exchange capacity which is normal when accounting for alveolar volume involved in gas exchange.   "~"Intraprocedural MO, 1/31/2025:"~"CONCLUSIONS"~" Overall LVEF is approximately 60% with no RWMA."~" Moderate concentric left ventricular hypertrophy. "~" All valves appear to be functioning normally."~" Severe mobile atheroma seen in the descending aorta and distal aortic arch."~" The proximal ascending aorta shows calcification from the ST junction to 1-2 cm "~" distal anteriorly."~"POST OPERATIVE FINDINGS"~" The patient underwent a CABG on bypass.  Postop rhythm remains sinus.  RV and "~" LV function are normal.  Overall LVEF is approximately 60% with no new RWMA.  "~" Mild TR.  AV and PV function appear normal.  Trace MR.  Aortic scan is "~" unchanged.  Mobile atheroma are still seen in the descending aorta and distal "~" arch."~"Physical Exam"~"Vital Signs/Labs"~"-: "~"Vital Signs"~"Temp	Pulse	Resp	BP	Pulse Ox"~" 37.9 C 	 77 	 16 	 104/69 	 91 "~" 02/01/25 06:56	 02/01/25 07:00	 02/01/25 06:56	 02/01/25 06:00	 02/01/25 07:00"~"	01/30/25	01/31/25	02/01/25"~"	11:59	11:59	11:59"~"Actual Weight		95 kg	101.2 kg"~"02/01/25 03:11 "~"02/01/25 03:11 "~"PT	 18.3 Sec (11.4-14.6)  H 	01/31/25  15:05    "~"INR	 1.49  	01/31/25  15:05    "~"APTT	 31.7 Sec (23.4-35.0)  	01/31/25  15:05    "~"Magnesium	 2.9 mg/dl (1.6-2.3)  H 	02/01/25  03:11    "~"Physical Exam"~"Constitutional: No acute distress and Comfortable"~"EENT: Anicteric and Moist mucous membranes"~"Cardiovascular: Rhythm & rate is regular, JVD pressure is normal, Pedal edema present, S1S2 is normal, Rub present and Other (There is edema of the RUE.)"~"Respiratory: Respiratory effort normal, Wheeze Absent, Rhonchi Absent and Crackles Present (Bilateral bases.)"~"GI: Soft, Distention absent, Flat, Non tender and Normal bowel sounds"~"Neuro/Psych: AO x 3"~"Data Reviewed"~"-"~"-: "~"Date of Service:  February 1, 2025"~"Medical Decision Making: Reviewed Test Results, Tests Ordered, Independent Historian Assessment, Test Interpretation and Review of Case with other Provider"~"EKG: Tracing Personally Visualized and interpreted and Report Reviewed by me"~"Echo: Tracing Personally Visualized and interpreted and Report Reviewed by me"~"X-Ray/CT/US/MRI/NUC/PET: Image Personally Visualized and interpreted and Report Reviewed by me"~"Medical Tests (PFT, Pathology etc): Image Personally Visualized and interpreted and Report Reviewed by me"~"Labs: Labs Reviewed by me"~"Old Records: Reviewed"

## 2025-02-02 VITALS — SYSTOLIC BLOOD PRESSURE: 145 MMHG | DIASTOLIC BLOOD PRESSURE: 86 MMHG | RESPIRATION RATE: 22 BRPM

## 2025-02-02 VITALS — DIASTOLIC BLOOD PRESSURE: 83 MMHG | SYSTOLIC BLOOD PRESSURE: 152 MMHG

## 2025-02-02 VITALS — DIASTOLIC BLOOD PRESSURE: 87 MMHG | SYSTOLIC BLOOD PRESSURE: 141 MMHG

## 2025-02-02 VITALS — SYSTOLIC BLOOD PRESSURE: 140 MMHG | DIASTOLIC BLOOD PRESSURE: 112 MMHG

## 2025-02-02 VITALS — RESPIRATION RATE: 18 BRPM

## 2025-02-02 VITALS — DIASTOLIC BLOOD PRESSURE: 85 MMHG | SYSTOLIC BLOOD PRESSURE: 135 MMHG

## 2025-02-02 VITALS — DIASTOLIC BLOOD PRESSURE: 80 MMHG | SYSTOLIC BLOOD PRESSURE: 123 MMHG | RESPIRATION RATE: 22 BRPM

## 2025-02-02 VITALS — DIASTOLIC BLOOD PRESSURE: 72 MMHG | SYSTOLIC BLOOD PRESSURE: 152 MMHG

## 2025-02-02 VITALS — DIASTOLIC BLOOD PRESSURE: 76 MMHG | RESPIRATION RATE: 22 BRPM | SYSTOLIC BLOOD PRESSURE: 136 MMHG

## 2025-02-02 VITALS — RESPIRATION RATE: 20 BRPM | SYSTOLIC BLOOD PRESSURE: 151 MMHG | DIASTOLIC BLOOD PRESSURE: 88 MMHG

## 2025-02-02 VITALS — RESPIRATION RATE: 22 BRPM

## 2025-02-02 VITALS — SYSTOLIC BLOOD PRESSURE: 134 MMHG | DIASTOLIC BLOOD PRESSURE: 82 MMHG

## 2025-02-02 VITALS — SYSTOLIC BLOOD PRESSURE: 160 MMHG | DIASTOLIC BLOOD PRESSURE: 90 MMHG

## 2025-02-02 VITALS — DIASTOLIC BLOOD PRESSURE: 84 MMHG | SYSTOLIC BLOOD PRESSURE: 139 MMHG

## 2025-02-02 VITALS — DIASTOLIC BLOOD PRESSURE: 74 MMHG | SYSTOLIC BLOOD PRESSURE: 119 MMHG

## 2025-02-02 VITALS — DIASTOLIC BLOOD PRESSURE: 86 MMHG | SYSTOLIC BLOOD PRESSURE: 146 MMHG

## 2025-02-02 VITALS — DIASTOLIC BLOOD PRESSURE: 76 MMHG | SYSTOLIC BLOOD PRESSURE: 154 MMHG

## 2025-02-02 VITALS — SYSTOLIC BLOOD PRESSURE: 146 MMHG | DIASTOLIC BLOOD PRESSURE: 90 MMHG

## 2025-02-02 VITALS — DIASTOLIC BLOOD PRESSURE: 86 MMHG | SYSTOLIC BLOOD PRESSURE: 139 MMHG

## 2025-02-02 LAB
BUN SERPL-MCNC: 38 MG/DL (ref 9–20)
CALCIUM SERPL-MCNC: 8.1 MG/DL (ref 8.4–10.2)
CHLORIDE SERPL-SCNC: 103 MMOL/L (ref 98–107)
CO2 SERPL-SCNC: 26 MMOL/L (ref 22–30)
EGFR: 27.55
ERYTHROCYTE [DISTWIDTH] IN BLOOD BY AUTOMATED COUNT: 14.1 % (ref 11.5–14.5)
ESTIMATED CREATININE CLEARANCE: 35 ML/MIN
GLUCOSE SERPL-MCNC: 128 MG/DL (ref 70–99)
HCT VFR BLD AUTO: 24.9 % (ref 39–52)
HGB BLD-MCNC: 8.7 G/DL (ref 13–18)
MAGNESIUM SERPL-MCNC: 2.7 MG/DL (ref 1.6–2.3)
MCHC RBC AUTO-ENTMCNC: 34.9 G/DL (ref 33–37)
MCV RBC AUTO: 83.6 FL (ref 80–94)
PLATELET # BLD AUTO: 141 10^3/UL (ref 130–400)
POTASSIUM SERPL-SCNC: 3.4 MMOL/L (ref 3.5–5.1)
SODIUM SERPL-SCNC: 138 MMOL/L (ref 135–145)

## 2025-02-02 RX ADMIN — PANTOPRAZOLE SODIUM 40 MG: 40 TABLET, DELAYED RELEASE ORAL at 10:06

## 2025-02-02 RX ADMIN — GABAPENTIN 100 MG: 100 CAPSULE ORAL at 16:58

## 2025-02-02 RX ADMIN — CLOPIDOGREL BISULFATE 75 MG: 75 TABLET ORAL at 10:06

## 2025-02-02 RX ADMIN — FUROSEMIDE 40 MG: 10 INJECTION, SOLUTION INTRAMUSCULAR; INTRAVENOUS at 13:06

## 2025-02-02 RX ADMIN — POTASSIUM CHLORIDE 40 MEQ: 1500 TABLET, EXTENDED RELEASE ORAL at 06:13

## 2025-02-02 RX ADMIN — POTASSIUM CHLORIDE 40 MEQ: 1500 TABLET, EXTENDED RELEASE ORAL at 06:17

## 2025-02-02 RX ADMIN — CARVEDILOL 12.5 MG: 12.5 TABLET, FILM COATED ORAL at 20:10

## 2025-02-02 RX ADMIN — ACETAMINOPHEN 1000 MG: 500 TABLET ORAL at 21:30

## 2025-02-02 RX ADMIN — AMLODIPINE BESYLATE 2.5 MG: 2.5 TABLET ORAL at 10:06

## 2025-02-02 RX ADMIN — ASPIRIN 81 MG: 81 TABLET, CHEWABLE ORAL at 10:06

## 2025-02-02 RX ADMIN — OXYBUTYNIN CHLORIDE 5 MG: 5 TABLET ORAL at 20:14

## 2025-02-02 RX ADMIN — CARVEDILOL 12.5 MG: 12.5 TABLET, FILM COATED ORAL at 23:13

## 2025-02-02 RX ADMIN — OXYBUTYNIN CHLORIDE 5 MG: 5 TABLET ORAL at 10:11

## 2025-02-02 RX ADMIN — OXYCODONE HYDROCHLORIDE 5 MG: 5 TABLET ORAL at 16:58

## 2025-02-02 RX ADMIN — ATORVASTATIN CALCIUM 40 MG: 40 TABLET, FILM COATED ORAL at 10:06

## 2025-02-02 RX ADMIN — SODIUM CHLORIDE 500: 900 INJECTION, SOLUTION INTRAVENOUS at 13:06

## 2025-02-02 RX ADMIN — FUROSEMIDE 40 MG: 10 INJECTION, SOLUTION INTRAMUSCULAR; INTRAVENOUS at 06:13

## 2025-02-02 RX ADMIN — GABAPENTIN 100 MG: 100 CAPSULE ORAL at 21:30

## 2025-02-02 RX ADMIN — AMIODARONE HYDROCHLORIDE 200 MG: 200 TABLET ORAL at 10:06

## 2025-02-02 RX ADMIN — ACETAMINOPHEN 1000 MG: 500 TABLET ORAL at 05:04

## 2025-02-02 RX ADMIN — MUPIROCIN 1 APPLIC: 20 OINTMENT TOPICAL at 20:10

## 2025-02-02 RX ADMIN — POTASSIUM CHLORIDE 20 MEQ: 1500 TABLET, EXTENDED RELEASE ORAL at 21:30

## 2025-02-02 RX ADMIN — OXYCODONE HYDROCHLORIDE 5 MG: 5 TABLET ORAL at 10:17

## 2025-02-02 RX ADMIN — TAMSULOSIN HYDROCHLORIDE 0.8 MG: 0.4 CAPSULE ORAL at 10:06

## 2025-02-02 RX ADMIN — METOPROLOL TARTRATE 12.5 MG: 25 TABLET, FILM COATED ORAL at 10:06

## 2025-02-02 RX ADMIN — AMIODARONE HYDROCHLORIDE 200 MG: 200 TABLET ORAL at 21:30

## 2025-02-02 RX ADMIN — ACETAMINOPHEN 1000 MG: 500 TABLET ORAL at 13:06

## 2025-02-02 RX ADMIN — LIDOCAINE 1 PATCH: 4 PATCH TOPICAL at 10:06

## 2025-02-02 RX ADMIN — AMIODARONE HYDROCHLORIDE 200 MG: 200 TABLET ORAL at 16:58

## 2025-02-02 RX ADMIN — GABAPENTIN 100 MG: 100 CAPSULE ORAL at 10:06

## 2025-02-02 RX ADMIN — SENNOSIDES AND DOCUSATE SODIUM 1 TABLET: 8.6; 5 TABLET ORAL at 20:10

## 2025-02-02 RX ADMIN — OXYCODONE HYDROCHLORIDE 5 MG: 5 TABLET ORAL at 05:04

## 2025-02-02 RX ADMIN — MULTIVITAMIN TABLET 1 TABLET: TABLET at 10:05

## 2025-02-02 RX ADMIN — MUPIROCIN 1 APPLIC: 20 OINTMENT TOPICAL at 10:07

## 2025-02-02 RX ADMIN — SENNOSIDES AND DOCUSATE SODIUM 1 TABLET: 8.6; 5 TABLET ORAL at 10:06

## 2025-02-02 NOTE — PTCARENOTE
VSS. Pt in NSR on monitor HR 74. B/P 134/82. POX 92% 6LNC. Morning labs obtained and sent. Pt resting in bed. Will continue to monitor pt needs.

## 2025-02-02 NOTE — W.PN.CD
"Today's Communication / Plan"~"-"~"-: "~"	-Remains in sinus rhythm on telemetry."~"	-Continue routine post-surgical care as directed by CT Surgery."~"	-Blood pressure is mildly elevated; consider increasing amlodipine to 5 mg daily."~"Impression / Plan"~"-"~"-: "~"Impression/Plan:  60 y/o male with HTN, HLD, AAA s/p repair (April 2024), renal artery aneurysm, CKD3 (baseline Cr 2.0), a history of pyelonephritis and multivessel CAD admitted for elective CABG."~"#Multivessel CAD"~"	-Remained stable S/P CABG x4 (LIMA to LAD, SVG to D1, LRA to OM1, SVG to OM2) with Dr. Jain, 1/31/2025 (POD #0)."~"	-Continue incentive spirometry."~"	-Remains in sinus rhythm on telemetry."~"	-Continue routine post-surgical care as directed by CT Surgery."~"#HTN"~"	-Blood pressure is mildly elevated; consider increasing amlodipine to 5 mg daily."~"	-No ACEi secondary to CKD."~"CKD:"~"	-Creatinine relatively stable."~"#HLD"~"	-Chronic, stable."~"	-Continue atorvastatin 40 mg daily. "~"	-Goal LDL &lt; 55."~"Critical Care Time = 37 minutes."~"DATA:"~"Cardiac Catheterization, 1/6/2025:"~"CONCLUSIONS"~"1.  Right dominant circulation with a chronic total occlusion of the proximal RCA, a 70% lesion in the proximal margin of OM1, an 80% lesion in the ostial circumflex, chronic total occlusion of OM1, and 80%, shelflike plaque in the proximal LAD and "~"a tapering, 90% lesion in the mid LAD."~"2.  Top normal filling pressures (LVEDP = 15 mmHg at 98.0 kg)."~"3.  Chronic kidney disease (baseline creatinine = 1.96).  Contrast volume = 25 mL."~"PFTs, 1/16/2025:"~"Impression:"~"No evidence of obstruction.  Mild restrictive lung defect.  Mild gas exchange capacity which is normal when accounting for alveolar volume involved in gas exchange.   "~"Intraprocedural MO, 1/31/2025:"~"CONCLUSIONS"~" Overall LVEF is approximately 60% with no RWMA."~" Moderate concentric left ventricular hypertrophy. "~" All valves appear to be functioning normally."~" Severe mobile atheroma seen in the descending aorta and distal aortic arch."~" The proximal ascending aorta shows calcification from the ST junction to 1-2 cm "~" distal anteriorly."~"POST OPERATIVE FINDINGS"~" The patient underwent a CABG on bypass.  Postop rhythm remains sinus.  RV and "~" LV function are normal.  Overall LVEF is approximately 60% with no new RWMA.  "~" Mild TR.  AV and PV function appear normal.  Trace MR.  Aortic scan is "~" unchanged.  Mobile atheroma are still seen in the descending aorta and distal "~" arch."~"Physical Exam"~"Vital Signs/Labs"~"-: "~"Vital Signs"~"Temp	Pulse	Resp	BP	Pulse Ox"~" 100.3 F 	 71 	 22 	 146/90 	 99 "~" 02/02/25 12:00	 02/02/25 13:00	 02/02/25 12:00	 02/02/25 11:09	 02/02/25 12:00"~"	02/01/25	02/02/25	02/03/25"~"	06:59	06:59	06:59"~"Actual Weight	101.2 kg	100.5 kg	"~"02/02/25 03:46 "~"02/02/25 03:46 "~"PT	 18.3 Sec (11.4-14.6)  H 	01/31/25  15:05    "~"INR	 1.49  	01/31/25  15:05    "~"APTT	 31.7 Sec (23.4-35.0)  	01/31/25  15:05    "~"Magnesium	 2.7 mg/dl (1.6-2.3)  H 	02/02/25  03:46    "~"Physical Exam"~"Constitutional: No acute distress and Comfortable"~"EENT: Anicteric and Moist mucous membranes"~"Cardiovascular: Rhythm & rate is regular, Systolic murmur absent, Pedal edema present (Trace) and S1S2 is normal"~"Respiratory: Respiratory effort normal, Wheeze Absent and Rhonchi Present"~"GI: Soft"~"Neuro/Psych: AO x 3"~"Other: Skin (Warm, dry)"~"Data Reviewed"~"-"~"-: "~"Date of Service:  February 2, 2025"~"EKG: Tracing Personally Visualized and interpreted (Telemetry: Sinus rhythm)"~"Critical Care Time (in minutes): 37"

## 2025-02-02 NOTE — PTCARENOTE
pt sat in the chair most of the day, walked x4 in the hallway with assistance, appetite improved. VSS, sinus rhythm maintained on tele. Assisted pt w PM care and placed in bed, medicated for pain, emotional support provided.

## 2025-02-02 NOTE — W.PN.UPDATE
"Update Note"~"Progress Note Update"~"-: "~"No pacing required since surgery.  1 ground wire and 1 epicardial ventricular wire removed without difficulty.  Patient to maintain bedrest x 1 hour with vital signs every 15 minutes x 1 hour."

## 2025-02-02 NOTE — PTCARENOTE
"assumed care of pt from previous shift RN, sinus rhythm on tele w HR 70's, /76, + peripheral pulses, +1 edema to bilateral upper and lower extremities. Lungs diminished, pox 93% on 4L NC. Coughing and deep breathing encouraged. Achieved 500 on "~"IS. +BS, tolerating PO intake. Kirby catheter draining yellow. CTx4 w minimal amount of red drainage. Epicardial V wire removed by CT SAMANTHA. Plan of care reviewed w the pt, questions encouraged. "

## 2025-02-02 NOTE — PTCARENOTE
"Assumed care of patient from dayshift RN. Pt oriented to person, place, and time. Occasionally forgetful but easily redirected. Pt SR on the tele monitor. HR 70s. BP slightly elevated. 151/88. . Left ulnar pulse present via doppler. +1 "~"upper/lower extremity edema present. Pt on 2 L NC. POX 92%. Lung sounds diminished B/L. IS and deep breathing encouraged. CT site C/D/I. Abdomen round. +BS x4. Kirby catheter C/D/I and draining yellow urine. Right IJ cordis and PIV x1 intact. All "~"surgical sites stable. See worklist for full nursing assessment nd interventions. Call bell within reach.  "
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
## 2025-02-03 VITALS — SYSTOLIC BLOOD PRESSURE: 91 MMHG | DIASTOLIC BLOOD PRESSURE: 63 MMHG

## 2025-02-03 VITALS — SYSTOLIC BLOOD PRESSURE: 154 MMHG | DIASTOLIC BLOOD PRESSURE: 75 MMHG

## 2025-02-03 VITALS — SYSTOLIC BLOOD PRESSURE: 111 MMHG | DIASTOLIC BLOOD PRESSURE: 75 MMHG

## 2025-02-03 VITALS — SYSTOLIC BLOOD PRESSURE: 108 MMHG | DIASTOLIC BLOOD PRESSURE: 67 MMHG

## 2025-02-03 VITALS — SYSTOLIC BLOOD PRESSURE: 153 MMHG | DIASTOLIC BLOOD PRESSURE: 91 MMHG

## 2025-02-03 VITALS — SYSTOLIC BLOOD PRESSURE: 157 MMHG | DIASTOLIC BLOOD PRESSURE: 78 MMHG

## 2025-02-03 VITALS — OXYGEN SATURATION: 94 % | HEART RATE: 59 BPM | SYSTOLIC BLOOD PRESSURE: 108 MMHG | DIASTOLIC BLOOD PRESSURE: 63 MMHG

## 2025-02-03 VITALS — SYSTOLIC BLOOD PRESSURE: 110 MMHG | DIASTOLIC BLOOD PRESSURE: 69 MMHG

## 2025-02-03 VITALS — DIASTOLIC BLOOD PRESSURE: 78 MMHG | SYSTOLIC BLOOD PRESSURE: 143 MMHG

## 2025-02-03 VITALS — DIASTOLIC BLOOD PRESSURE: 70 MMHG | SYSTOLIC BLOOD PRESSURE: 116 MMHG | RESPIRATION RATE: 18 BRPM

## 2025-02-03 VITALS — RESPIRATION RATE: 20 BRPM

## 2025-02-03 VITALS — SYSTOLIC BLOOD PRESSURE: 157 MMHG | DIASTOLIC BLOOD PRESSURE: 78 MMHG | RESPIRATION RATE: 18 BRPM

## 2025-02-03 VITALS — SYSTOLIC BLOOD PRESSURE: 162 MMHG | DIASTOLIC BLOOD PRESSURE: 85 MMHG

## 2025-02-03 VITALS — DIASTOLIC BLOOD PRESSURE: 62 MMHG | SYSTOLIC BLOOD PRESSURE: 101 MMHG

## 2025-02-03 VITALS — RESPIRATION RATE: 18 BRPM

## 2025-02-03 VITALS — SYSTOLIC BLOOD PRESSURE: 137 MMHG | RESPIRATION RATE: 20 BRPM | DIASTOLIC BLOOD PRESSURE: 79 MMHG

## 2025-02-03 LAB
BUN SERPL-MCNC: 37 MG/DL (ref 9–20)
CALCIUM SERPL-MCNC: 7.8 MG/DL (ref 8.4–10.2)
CHLORIDE SERPL-SCNC: 102 MMOL/L (ref 98–107)
CO2 SERPL-SCNC: 29 MMOL/L (ref 22–30)
EGFR: 28.87
ERYTHROCYTE [DISTWIDTH] IN BLOOD BY AUTOMATED COUNT: 13.7 % (ref 11.5–14.5)
ESTIMATED CREATININE CLEARANCE: 37 ML/MIN
GLUCOSE SERPL-MCNC: 119 MG/DL (ref 70–99)
HCT VFR BLD AUTO: 24.8 % (ref 39–52)
HGB BLD-MCNC: 8.7 G/DL (ref 13–18)
MAGNESIUM SERPL-MCNC: 2.3 MG/DL (ref 1.6–2.3)
MCHC RBC AUTO-ENTMCNC: 35.1 G/DL (ref 33–37)
MCV RBC AUTO: 83.8 FL (ref 80–94)
PLATELET # BLD AUTO: 145 10^3/UL (ref 130–400)
POTASSIUM SERPL-SCNC: 3.3 MMOL/L (ref 3.5–5.1)
SODIUM SERPL-SCNC: 138 MMOL/L (ref 135–145)

## 2025-02-03 RX ADMIN — POTASSIUM CHLORIDE 40 MEQ: 1500 TABLET, EXTENDED RELEASE ORAL at 20:09

## 2025-02-03 RX ADMIN — ACETAMINOPHEN 1000 MG: 500 TABLET ORAL at 21:38

## 2025-02-03 RX ADMIN — LIDOCAINE: 4 PATCH TOPICAL at 08:16

## 2025-02-03 RX ADMIN — POTASSIUM CHLORIDE 40 MEQ: 1500 TABLET, EXTENDED RELEASE ORAL at 05:56

## 2025-02-03 RX ADMIN — MUPIROCIN 1 APPLIC: 20 OINTMENT TOPICAL at 08:16

## 2025-02-03 RX ADMIN — POTASSIUM CHLORIDE 40 MEQ: 1500 TABLET, EXTENDED RELEASE ORAL at 08:14

## 2025-02-03 RX ADMIN — AMLODIPINE BESYLATE 2.5 MG: 2.5 TABLET ORAL at 08:15

## 2025-02-03 RX ADMIN — ASPIRIN 81 MG: 81 TABLET, CHEWABLE ORAL at 08:15

## 2025-02-03 RX ADMIN — MUPIROCIN 1 APPLIC: 20 OINTMENT TOPICAL at 20:10

## 2025-02-03 RX ADMIN — ATORVASTATIN CALCIUM 40 MG: 40 TABLET, FILM COATED ORAL at 08:14

## 2025-02-03 RX ADMIN — OXYBUTYNIN CHLORIDE 5 MG: 5 TABLET ORAL at 09:12

## 2025-02-03 RX ADMIN — PANTOPRAZOLE SODIUM 40 MG: 40 TABLET, DELAYED RELEASE ORAL at 08:14

## 2025-02-03 RX ADMIN — ACETAMINOPHEN 1000 MG: 500 TABLET ORAL at 05:56

## 2025-02-03 RX ADMIN — TAMSULOSIN HYDROCHLORIDE 0.8 MG: 0.4 CAPSULE ORAL at 08:15

## 2025-02-03 RX ADMIN — ACETAMINOPHEN 1000 MG: 500 TABLET ORAL at 15:08

## 2025-02-03 RX ADMIN — FUROSEMIDE 40 MG: 10 INJECTION, SOLUTION INTRAMUSCULAR; INTRAVENOUS at 08:15

## 2025-02-03 RX ADMIN — MULTIVITAMIN TABLET 1 TABLET: TABLET at 08:15

## 2025-02-03 RX ADMIN — SODIUM CHLORIDE: 900 INJECTION, SOLUTION INTRAVENOUS at 12:55

## 2025-02-03 RX ADMIN — CARVEDILOL 25 MG: 12.5 TABLET, FILM COATED ORAL at 08:15

## 2025-02-03 RX ADMIN — SENNOSIDES AND DOCUSATE SODIUM 1 TABLET: 8.6; 5 TABLET ORAL at 08:14

## 2025-02-03 RX ADMIN — AMIODARONE HYDROCHLORIDE 200 MG: 200 TABLET ORAL at 08:15

## 2025-02-03 RX ADMIN — SENNOSIDES AND DOCUSATE SODIUM 1 TABLET: 8.6; 5 TABLET ORAL at 20:09

## 2025-02-03 RX ADMIN — GABAPENTIN 100 MG: 100 CAPSULE ORAL at 08:14

## 2025-02-03 RX ADMIN — OXYBUTYNIN CHLORIDE 5 MG: 5 TABLET ORAL at 20:09

## 2025-02-03 RX ADMIN — CALCIUM GLUCONATE 100: 20 INJECTION, SOLUTION INTRAVENOUS at 05:56

## 2025-02-03 RX ADMIN — CLOPIDOGREL BISULFATE 75 MG: 75 TABLET ORAL at 08:14

## 2025-02-03 NOTE — PTCARENOTE
Pt ambulated with RN, became dizzy once in hallway, seated pt and brought back to room. BP once in bed 110/69 MAP 81. CT NP, Dimple, made aware. Medication holds acknowledged.

## 2025-02-03 NOTE — PTCARENOTE
Pt dizzy while walking with cardiac rehab. BP 91/63 MAP 72 once seated in chair. CT NP, Dimple made aware.
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
## 2025-02-03 NOTE — PTCARENOTE
Pt voided in toilet, despite RN direction to void in urinal s/p Kirby catheter removal. Post void bladder scan 44mL.

## 2025-02-03 NOTE — PTCARENOTE
Assessment unchanged. Pt SR on the tele monitor. HR 70s. BP stable. Extra 12.5mg coreg added - see MAR. Pt 93% on 2 L NC. All surgical sites stable. Kirby catheter C/D/I and draining yellow urine. Call bell within reach.

## 2025-02-03 NOTE — PTCARENOTE
"Assumed care of patient from dayshift RN. Pt oriented to person, place, and time. Occasionally forgetful. SR on the tele monitor. HR 60-70s. BP slightly elevated. 157/78. . Left ulnar pulse present via doppler. B/L trace upper/lower extremity "~"edema present. Pt 95% on RA. Lung sounds diminished throughout. Deep breathing and IS encouraged. CT site C/D/I. Abdomen round. +BSx4. Pt instructed to ring to go to the bathroom. All surgical sites stable. No c/o pain at this time. PIV x1 intact. "~"Pt repositioned in bed. See worklist for full nursing assessment and interventions. Call bell within reach.  "

## 2025-02-03 NOTE — PTCARENOTE
"Assumed care of patient at 0700. Pt is awake, alert, and oriented. Pt with known hx of TBI and left esotropia. No complaints of pain at this time. Pt remains SR with HR 60's. /70 MAP 84. Pulses palpable. Trace edema in lower extremities. Pulse "~"oximetry 96% on room air. Pt achieving 1000 with IS, continued use encouraged. Pt tolerating low cholesterol diet. Kirby catheter in place for critical I&O, Kirby care completed. Midsternal incision with post-op dressing CDI. Left arm incision and "~"right leg incisions approximated and JOSE ALBERTO. Right groin puncture intact. Right IJ cordis in place with KVO. Pt currently OOB eating breakfast with call bell within reach. "

## 2025-02-03 NOTE — PTCARENOTE
Pt remains SR with HR 60's. /75 MAP 83. Pulse oximetry 97% on room air. Cordis and Kirby catheter d/c'd at 1330, pt remains DTV.

## 2025-02-03 NOTE — PTCARENOTE
Assessment unchanged. Pt SR on the tele monitor. HR 70s. BP stable. Pt 93% on 2 L NC. Kirby catheter intact and draining yellow urine. All surgical sites stable. No c/o pain at this time. Labs sent. Call bell within reach.

## 2025-02-03 NOTE — W.PN.CD
"Today's Communication / Plan"~"-"~"-: "~"Consider eventually changing amlodipine to nifedipine (home medication)."~"Continue diuresis."~"KCl 40 mEq this morning."~"Monitor temp.  Encourage incentive spirometry to reduce atelectasis."~"Impression / Plan"~"-"~"-: "~"Impression/Plan:  58 y/o male with HTN, HLD, AAA s/p repair (April 2024), renal artery aneurysm, CKD3 (baseline Cr 2.0), a history of pyelonephritis and multivessel CAD admitted for elective CABG."~"#Multivessel CAD"~"	-Chronic, progressive."~"	-Remained stable S/P CABG x4 (LIMA to LAD, SVG to D1, LRA to OM1, SVG to OM2) with Dr. Jain, 1/31/2025."~"	-Continue incentive spirometry."~"	-Continue amiodarone, amlodipine (LRA patency), aspirin, atorvastatin, carvedilol 12.5 mg BID.  Agree with reduction in carvedilol."~"	-Consider converting amlodipine to nifedipine (home medication)."~"	-Continue diuresis with furosemide 40 mg IV.  Change to PO diuretics as we approach dry weight."~"	-Chest tube/pain management per CT surgery."~"	-Ambulate."~"#Fever"~"	-Acute, improving."~"	-US r/o DVT/VTE."~"	-UA negative for infection."~"	-No evidence of wound infection."~"	-No cefazolin since 2/1/2025 (drug fever)."~"	-Most likely atelectasis, will continue to resolve with incentive spirometry."~"#HTN"~"	-Chronic, elevated yesterday, improved with carvedilol."~"	-No ACEi/ARB secondary to CKD."~"	-Consider changing amlodipine to nifedipine (home medication)."~"#CKD"~"	-Stable."~"	-KCl 40 mEq this morning."~"	-Avoid nephrotoxic medications."~"#HLD"~"	-Chronic, stable."~"	-Continue atorvastatin 40 mg daily. "~"	-Goal LDL &lt; 55."~"Subjective/Interval History:"~"Tmax = 38.4 (2/2/2025 @ 04:00).  Tcur = 36.8."~"Duplex US negative for UE/LE DVT."~"Weight is down 2.1 kg, still 3.4 kg up from baseline (95 --&gt; 101.2 --&gt; 100.5 --&gt; 98.4)."~"Hypertensive on 2/2/2025.  Carvedilol increased to 25 mg BID but patient felt dizzy with lower BP.  Carvedilol decreased to 12.5 mg BID."~"SaO2 = 96% on RA."~"K+ 3.3 this morning."~"DATA:"~"Cardiac Catheterization, 1/6/2025:"~"CONCLUSIONS"~"1.  Right dominant circulation with a chronic total occlusion of the proximal RCA, a 70% lesion in the proximal margin of OM1, an 80% lesion in the ostial circumflex, chronic total occlusion of OM1, and 80%, shelflike plaque in the proximal LAD and "~"a tapering, 90% lesion in the mid LAD."~"2.  Top normal filling pressures (LVEDP = 15 mmHg at 98.0 kg)."~"3.  Chronic kidney disease (baseline creatinine = 1.96).  Contrast volume = 25 mL."~"PFTs, 1/16/2025:"~"Impression:"~"No evidence of obstruction.  Mild restrictive lung defect.  Mild gas exchange capacity which is normal when accounting for alveolar volume involved in gas exchange.   "~"Intraprocedural MO, 1/31/2025:"~"CONCLUSIONS"~" Overall LVEF is approximately 60% with no RWMA."~" Moderate concentric left ventricular hypertrophy. "~" All valves appear to be functioning normally."~" Severe mobile atheroma seen in the descending aorta and distal aortic arch."~" The proximal ascending aorta shows calcification from the ST junction to 1-2 cm "~" distal anteriorly."~"POST OPERATIVE FINDINGS"~" The patient underwent a CABG on bypass.  Postop rhythm remains sinus.  RV and "~" LV function are normal.  Overall LVEF is approximately 60% with no new RWMA.  "~" Mild TR.  AV and PV function appear normal.  Trace MR.  Aortic scan is "~" unchanged.  Mobile atheroma are still seen in the descending aorta and distal "~" arch."~"Physical Exam"~"Vital Signs/Labs"~"-: "~"Vital Signs"~"Temp	Pulse	Resp	BP	Pulse Ox"~" 36.8 C 	 63 	 18 	 116/70 	 96 "~" 02/03/25 08:13	 02/03/25 08:13	 02/03/25 08:13	 02/03/25 08:13	 02/03/25 09:46"~"	02/01/25	02/02/25	02/03/25"~"	11:59	11:59	11:59"~"Actual Weight	101.2 kg	100.5 kg	98.4 kg"~"02/03/25 03:54 "~"02/03/25 03:54 "~"PT	 18.3 Sec (11.4-14.6)  H 	01/31/25  15:05    "~"INR	 1.49  	01/31/25  15:05    "~"APTT	 31.7 Sec (23.4-35.0)  	01/31/25  15:05    "~"Magnesium	 2.3 mg/dl (1.6-2.3)  	02/03/25  03:54    "~"Physical Exam"~"Constitutional: No acute distress and Comfortable"~"EENT: Anicteric and Moist mucous membranes"~"Cardiovascular: Rhythm & rate is regular, Pedal edema is absent, JVD pressure is normal, S1S2 is normal and Murmur/rub/gallop absent"~"Respiratory: Respiratory effort normal, Lungs clear to auscul., Wheeze Absent, Crackles Absent and Rhonchi Absent"~"GI: Soft, Distention absent, Flat, Non tender and Normal bowel sounds"~"Neuro/Psych: AO x 3"~"Data Reviewed"~"-"~"-: "~"Date of Service:  February 3, 2025"~"Medical Decision Making: Reviewed Test Results, Independent Historian Assessment and Test Interpretation"~"EKG: Tracing Personally Visualized and interpreted and Report Reviewed by me"~"Echo: Report Reviewed by me"~"X-Ray/CT/US/MRI/NUC/PET: Image Personally Visualized and interpreted and Report Reviewed by me"~"Medical Tests (PFT, Pathology etc): Image Personally Visualized and interpreted and Report Reviewed by me"~"Labs: Labs Reviewed by me"~"Old Records: Reviewed"

## 2025-02-04 VITALS — DIASTOLIC BLOOD PRESSURE: 65 MMHG | SYSTOLIC BLOOD PRESSURE: 120 MMHG

## 2025-02-04 VITALS — DIASTOLIC BLOOD PRESSURE: 75 MMHG | SYSTOLIC BLOOD PRESSURE: 152 MMHG | RESPIRATION RATE: 18 BRPM

## 2025-02-04 VITALS — DIASTOLIC BLOOD PRESSURE: 60 MMHG | OXYGEN SATURATION: 96 % | HEART RATE: 65 BPM | SYSTOLIC BLOOD PRESSURE: 120 MMHG

## 2025-02-04 VITALS — DIASTOLIC BLOOD PRESSURE: 83 MMHG | SYSTOLIC BLOOD PRESSURE: 156 MMHG

## 2025-02-04 VITALS — DIASTOLIC BLOOD PRESSURE: 92 MMHG | RESPIRATION RATE: 20 BRPM | SYSTOLIC BLOOD PRESSURE: 148 MMHG

## 2025-02-04 VITALS — DIASTOLIC BLOOD PRESSURE: 83 MMHG | SYSTOLIC BLOOD PRESSURE: 148 MMHG | RESPIRATION RATE: 16 BRPM

## 2025-02-04 VITALS — DIASTOLIC BLOOD PRESSURE: 65 MMHG | SYSTOLIC BLOOD PRESSURE: 129 MMHG

## 2025-02-04 VITALS — SYSTOLIC BLOOD PRESSURE: 136 MMHG | DIASTOLIC BLOOD PRESSURE: 74 MMHG

## 2025-02-04 VITALS — RESPIRATION RATE: 20 BRPM

## 2025-02-04 VITALS — SYSTOLIC BLOOD PRESSURE: 111 MMHG | DIASTOLIC BLOOD PRESSURE: 71 MMHG

## 2025-02-04 VITALS — DIASTOLIC BLOOD PRESSURE: 92 MMHG | SYSTOLIC BLOOD PRESSURE: 148 MMHG

## 2025-02-04 VITALS — DIASTOLIC BLOOD PRESSURE: 71 MMHG | SYSTOLIC BLOOD PRESSURE: 140 MMHG

## 2025-02-04 VITALS — RESPIRATION RATE: 18 BRPM

## 2025-02-04 VITALS — DIASTOLIC BLOOD PRESSURE: 60 MMHG | SYSTOLIC BLOOD PRESSURE: 107 MMHG

## 2025-02-04 VITALS — RESPIRATION RATE: 16 BRPM

## 2025-02-04 LAB
BUN SERPL-MCNC: 38 MG/DL (ref 9–20)
CALCIUM SERPL-MCNC: 8.2 MG/DL (ref 8.4–10.2)
CHLORIDE SERPL-SCNC: 105 MMOL/L (ref 98–107)
CO2 SERPL-SCNC: 26 MMOL/L (ref 22–30)
EGFR: 30.32
ERYTHROCYTE [DISTWIDTH] IN BLOOD BY AUTOMATED COUNT: 14.2 % (ref 11.5–14.5)
ESTIMATED CREATININE CLEARANCE: 37 ML/MIN
GLUCOSE SERPL-MCNC: 101 MG/DL (ref 70–99)
HCT VFR BLD AUTO: 26.2 % (ref 39–52)
HGB BLD-MCNC: 9 G/DL (ref 13–18)
MAGNESIUM SERPL-MCNC: 2.3 MG/DL (ref 1.6–2.3)
MCHC RBC AUTO-ENTMCNC: 34.4 G/DL (ref 33–37)
MCV RBC AUTO: 84.5 FL (ref 80–94)
PLATELET # BLD AUTO: 201 10^3/UL (ref 130–400)
POTASSIUM SERPL-SCNC: 3.9 MMOL/L (ref 3.5–5.1)
SODIUM SERPL-SCNC: 141 MMOL/L (ref 135–145)

## 2025-02-04 RX ADMIN — CLOPIDOGREL BISULFATE 75 MG: 75 TABLET ORAL at 08:57

## 2025-02-04 RX ADMIN — SODIUM CHLORIDE: 900 INJECTION, SOLUTION INTRAVENOUS at 08:58

## 2025-02-04 RX ADMIN — ASPIRIN 81 MG: 81 TABLET, CHEWABLE ORAL at 08:57

## 2025-02-04 RX ADMIN — OXYBUTYNIN CHLORIDE 5 MG: 5 TABLET ORAL at 09:04

## 2025-02-04 RX ADMIN — CARVEDILOL 12.5 MG: 12.5 TABLET, FILM COATED ORAL at 08:57

## 2025-02-04 RX ADMIN — PANTOPRAZOLE SODIUM 40 MG: 40 TABLET, DELAYED RELEASE ORAL at 08:57

## 2025-02-04 RX ADMIN — TAMSULOSIN HYDROCHLORIDE 0.8 MG: 0.4 CAPSULE ORAL at 08:57

## 2025-02-04 RX ADMIN — LIDOCAINE: 4 PATCH TOPICAL at 08:58

## 2025-02-04 RX ADMIN — CALCIUM GLUCONATE 100: 20 INJECTION, SOLUTION INTRAVENOUS at 03:39

## 2025-02-04 RX ADMIN — SENNOSIDES AND DOCUSATE SODIUM 1 TABLET: 8.6; 5 TABLET ORAL at 21:16

## 2025-02-04 RX ADMIN — POTASSIUM CHLORIDE 40 MEQ: 1500 TABLET, EXTENDED RELEASE ORAL at 05:23

## 2025-02-04 RX ADMIN — OXYBUTYNIN CHLORIDE 5 MG: 5 TABLET ORAL at 21:15

## 2025-02-04 RX ADMIN — POTASSIUM CHLORIDE 40 MEQ: 1500 TABLET, EXTENDED RELEASE ORAL at 09:04

## 2025-02-04 RX ADMIN — ACETAMINOPHEN 1000 MG: 500 TABLET ORAL at 13:45

## 2025-02-04 RX ADMIN — ACETAMINOPHEN 1000 MG: 500 TABLET ORAL at 21:16

## 2025-02-04 RX ADMIN — ACETAMINOPHEN 1000 MG: 500 TABLET ORAL at 05:24

## 2025-02-04 RX ADMIN — AMLODIPINE BESYLATE 2.5 MG: 2.5 TABLET ORAL at 08:57

## 2025-02-04 RX ADMIN — CARVEDILOL 12.5 MG: 12.5 TABLET, FILM COATED ORAL at 21:15

## 2025-02-04 RX ADMIN — MULTIVITAMIN TABLET 1 TABLET: TABLET at 08:57

## 2025-02-04 RX ADMIN — ATORVASTATIN CALCIUM 40 MG: 40 TABLET, FILM COATED ORAL at 08:57

## 2025-02-04 RX ADMIN — MUPIROCIN 1 APPLIC: 20 OINTMENT TOPICAL at 08:58

## 2025-02-04 RX ADMIN — FUROSEMIDE 40 MG: 10 INJECTION, SOLUTION INTRAMUSCULAR; INTRAVENOUS at 08:57

## 2025-02-04 RX ADMIN — SENNOSIDES AND DOCUSATE SODIUM 1 TABLET: 8.6; 5 TABLET ORAL at 08:58

## 2025-02-04 NOTE — PN.CDI
"CDI"~"- -"~"CDI: "~"Physician Documentation Request"~"Admit Date: 01/31/25 04:40"~"Dear CT Surgery,"~"Patient admitted for CAD."~"2/2 Potassium level: 3.4"~"2/2 Potassium chloride 100 meq PO administered"~"2/3 Potassium level: 3.3"~"2/3 Potassium chloride 120 meq PO administered"~"Based on the above, could you clarify in the progress notes, the appropriate diagnosis, if significant, that supports the above abnormalities and additional evaluation, monitoring and/or treatment rendered:"~"	Hypokalemia"~"	Abnormal lab value insignificant"~"	Other"~"Use of terms such as suspected, likely, concern for, or probable (associated with a specific diagnosis that is being evaluated, monitored, or treated as if it exists) are acceptable and can be coded in the inpatient setting, when documented at the "~"time of discharge. "~"Thank you, "~"Rosie Coronado RN, BSN"~"CDI Specialist"~"Available via Tiger text"~"Please use your independent medical judgment in providing your response."
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
## 2025-02-04 NOTE — PTCARENOTE
Pt remains SR with HR 60's. /71 MAP 84. Pulse oximetry 98% on room air. Pt continues to require frequent reinforcement regarding plan of care and pot-op education.

## 2025-02-04 NOTE — W.PN.CD
"Today's Communication / Plan"~"-"~"-: "~"going well"~"convert to PO diuretics prior to discharge, likely tomorrow"~"Impression / Plan"~"-"~"-: "~"Impression/Plan:  60 y/o male with HTN, HLD, AAA s/p repair (April 2024), renal artery aneurysm, CKD3 (baseline Cr 2.0), a history of pyelonephritis and multivessel CAD admitted for elective CABG."~"#Multivessel CAD"~"	-Chronic, progressive."~"	-Remained stable S/P CABG x4 (LIMA to LAD, SVG to D1, LRA to OM1, SVG to OM2) with Dr. Jain, 1/31/2025."~"	-Continue incentive spirometry."~"	-Continue amlodipine (LRA patency), aspirin, atorvastatin, carvedilol 12.5 mg BID"~"	-agree with holding amio in setting of bradycardia, no issues with post-op Afib"~"	-Continue diuresis with furosemide 40 mg IV.  Change to PO diuretics as we approach dry weight, likely tomorrow"~"	-Chest tube/pain management per CT surgery."~"	-Ambulate."~"#Fever, resolved"~"	-Acute, improving."~"	-US negative DVT/VTE."~"	-UA negative for infection."~"	-No evidence of wound infection."~"	-No cefazolin since 2/1/2025 (drug fever)."~"	-Most likely atelectasis, will continue to resolve with incentive spirometry."~"#HTN, controlled"~"	-Chronic"~"	-cont. coreg, amlodipine"~"	-No ACEi/ARB secondary to CKD."~"#CKD"~"	-slightly worsened from baseline"~"	-Avoid nephrotoxic medications."~"	-trend with diuresis"~"#HLD"~"	-Chronic, stable."~"	-Continue atorvastatin 40 mg daily. "~"	-Goal LDL &lt; 55."~"Subjective/Interval History:"~"concerned about swelling in arm and legs at sites of graft harvest"~"felt well walking today"~"had one episode of lightheadedness which resolved"~"is concerned these symptoms resolving before he goes home"~"DATA:"~"Cardiac Catheterization, 1/6/2025:"~"CONCLUSIONS"~"1.  Right dominant circulation with a chronic total occlusion of the proximal RCA, a 70% lesion in the proximal margin of OM1, an 80% lesion in the ostial circumflex, chronic total occlusion of OM1, and 80%, shelflike plaque in the proximal LAD and "~"a tapering, 90% lesion in the mid LAD."~"2.  Top normal filling pressures (LVEDP = 15 mmHg at 98.0 kg)."~"3.  Chronic kidney disease (baseline creatinine = 1.96).  Contrast volume = 25 mL."~"PFTs, 1/16/2025:"~"Impression:"~"No evidence of obstruction.  Mild restrictive lung defect.  Mild gas exchange capacity which is normal when accounting for alveolar volume involved in gas exchange.   "~"Intraprocedural MO, 1/31/2025:"~"CONCLUSIONS"~" Overall LVEF is approximately 60% with no RWMA."~" Moderate concentric left ventricular hypertrophy. "~" All valves appear to be functioning normally."~" Severe mobile atheroma seen in the descending aorta and distal aortic arch."~" The proximal ascending aorta shows calcification from the ST junction to 1-2 cm "~" distal anteriorly."~"POST OPERATIVE FINDINGS"~" The patient underwent a CABG on bypass.  Postop rhythm remains sinus.  RV and "~" LV function are normal.  Overall LVEF is approximately 60% with no new RWMA.  "~" Mild TR.  AV and PV function appear normal.  Trace MR.  Aortic scan is "~" unchanged.  Mobile atheroma are still seen in the descending aorta and distal "~" arch."~"Physical Exam"~"Vital Signs/Labs"~"-: "~"Vital Signs"~"Temp	Pulse	Resp	BP	Pulse Ox"~" 36.7 C 	 62 	 16 	 111/71 	 98 "~" 02/04/25 12:38	 02/04/25 12:38	 02/04/25 12:38	 02/04/25 11:36	 02/04/25 12:38"~"	02/03/25	02/04/25	02/05/25"~"	06:59	06:59	06:59"~"Actual Weight	98.4 kg	97.3 kg	"~"02/04/25 03:37 "~"02/04/25 03:37 "~"PT	 18.3 Sec (11.4-14.6)  H 	01/31/25  15:05    "~"INR	 1.49  	01/31/25  15:05    "~"APTT	 31.7 Sec (23.4-35.0)  	01/31/25  15:05    "~"Magnesium	 2.3 mg/dl (1.6-2.3)  	02/04/25  03:37    "~"Physical Exam"~"Constitutional: No acute distress"~"Cardiovascular: Rhythm & rate is regular"~"Respiratory: Respiratory effort normal"~"Neuro/Psych: AO x 3"~"Data Reviewed"~"-"~"-: "~"Date of Service:  February 4, 2025"~"Medical Decision Making: Reviewed Test Results"~"EKG: Tracing Personally Visualized and interpreted"~"Labs: Labs Reviewed by me"

## 2025-02-04 NOTE — PTCARENOTE
"Assumed care of patient at 0700. Pt is awake, alert, and oriented. Pt with known hx of left esotropia. No complaints of pain. Pt with complaints of right leg swelling, CT NP aware and discussed use of Lasix with pt. Pt requires frequent "~"reinforcement of education and information regarding plan of care. Pt remains SR with HR 60's. /71 MAP 85. Pulse oximetry 97% on room air. Pt tolerating PO diet. Voiding without issue. Midsternal incision post-op dressing CDI. Right leg "~"incision and left arm incisions approximated and JOSE ALBERTO. Pt currently sitting OOB in chair with call bell within reach, chair alarm in place due to occasional impulsiveness.  "

## 2025-02-04 NOTE — PTCARENOTE
2 view x-ray done. Pt remains SR with HR 60's. /65 MAP 82. Pulse oximetry 93% on room air. Pt ambulated with [address] with RN assistance without issue.

## 2025-02-04 NOTE — PTCARENOTE
"Assessment unchanged. Pt Sinus on the tele monitor. HR 60s-70. BP stable. Pt is 94-97% on RA. All surgical sites stable. Pt assisted OOB to void in the bathroom and then repositioned back into bed. No c/o pain at this time. Labs drawn and sent. Call "~"bell within reach. "

## 2025-02-04 NOTE — PTCARENOTE
"Report received from DONNA Perrin. Pt helped to BR to void clear, yellow urine at 2000. Pt assessed at 2100. Pt with L eye esotropia. Hx TBI. Awake, alert, oriented x 4. Speech clear. Moves all extremities equally. Room air sat 94%. + MARSHALL after walk "~"from BR. BBS present. Decreased to B bases. CDB and IS encouraged.  Pt in SR. Rate 60-70's. . Scheduled meds given. Audible heart tones. For pulse and wound assessments, see flowsheets. Belly soft, nontender. Normoactive bs x 4. Pt had BM "~"2/3/25. No c/o pain. Ongoing plan of care. "

## 2025-02-04 NOTE — PTCARENOTE
Assessment unchanged. Pt SR on the tele monitor. HR 60s. /75. MAP 95. Pt 94% on RA. All surgical sites stable. Call bell within reach.

## 2025-02-04 NOTE — PTCARENOTE
Pt showered without issue. Steps completed with cardiac rehab. Pt ambulating with RN assistance without issue.

## 2025-02-05 ENCOUNTER — TELEPHONE (OUTPATIENT)
Dept: NEPHROLOGY | Facility: CLINIC | Age: 60
End: 2025-02-05

## 2025-02-05 VITALS — RESPIRATION RATE: 16 BRPM | DIASTOLIC BLOOD PRESSURE: 69 MMHG | SYSTOLIC BLOOD PRESSURE: 123 MMHG

## 2025-02-05 VITALS — SYSTOLIC BLOOD PRESSURE: 147 MMHG | RESPIRATION RATE: 16 BRPM | DIASTOLIC BLOOD PRESSURE: 86 MMHG

## 2025-02-05 VITALS — RESPIRATION RATE: 18 BRPM

## 2025-02-05 VITALS — HEART RATE: 65 BPM | SYSTOLIC BLOOD PRESSURE: 122 MMHG | OXYGEN SATURATION: 97 % | DIASTOLIC BLOOD PRESSURE: 60 MMHG

## 2025-02-05 VITALS — DIASTOLIC BLOOD PRESSURE: 68 MMHG | SYSTOLIC BLOOD PRESSURE: 110 MMHG

## 2025-02-05 VITALS — DIASTOLIC BLOOD PRESSURE: 60 MMHG | SYSTOLIC BLOOD PRESSURE: 122 MMHG

## 2025-02-05 VITALS — DIASTOLIC BLOOD PRESSURE: 87 MMHG | SYSTOLIC BLOOD PRESSURE: 160 MMHG

## 2025-02-05 VITALS — SYSTOLIC BLOOD PRESSURE: 112 MMHG | DIASTOLIC BLOOD PRESSURE: 86 MMHG

## 2025-02-05 LAB
BUN SERPL-MCNC: 40 MG/DL (ref 9–20)
CALCIUM SERPL-MCNC: 8.3 MG/DL (ref 8.4–10.2)
CHLORIDE SERPL-SCNC: 105 MMOL/L (ref 98–107)
CO2 SERPL-SCNC: 27 MMOL/L (ref 22–30)
EGFR: 33.66
ERYTHROCYTE [DISTWIDTH] IN BLOOD BY AUTOMATED COUNT: 14.1 % (ref 11.5–14.5)
ERYTHROCYTE [DISTWIDTH] IN BLOOD BY AUTOMATED COUNT: 14.3 % (ref 11.5–14.5)
ESTIMATED CREATININE CLEARANCE: 41 ML/MIN
GLUCOSE SERPL-MCNC: 104 MG/DL (ref 70–99)
HCT VFR BLD AUTO: 24.6 % (ref 39–52)
HCT VFR BLD AUTO: 27.5 % (ref 39–52)
HGB BLD-MCNC: 8.4 G/DL (ref 13–18)
HGB BLD-MCNC: 9.4 G/DL (ref 13–18)
MAGNESIUM SERPL-MCNC: 2.3 MG/DL (ref 1.6–2.3)
MCHC RBC AUTO-ENTMCNC: 34.1 G/DL (ref 33–37)
MCHC RBC AUTO-ENTMCNC: 34.2 G/DL (ref 33–37)
MCV RBC AUTO: 84.4 FL (ref 80–94)
MCV RBC AUTO: 86.3 FL (ref 80–94)
PLATELET # BLD AUTO: 231 10^3/UL (ref 130–400)
PLATELET # BLD AUTO: 249 10^3/UL (ref 130–400)
POTASSIUM SERPL-SCNC: 4.2 MMOL/L (ref 3.5–5.1)
SODIUM SERPL-SCNC: 139 MMOL/L (ref 135–145)

## 2025-02-05 RX ADMIN — MULTIVITAMIN TABLET 1 TABLET: TABLET at 08:16

## 2025-02-05 RX ADMIN — PANTOPRAZOLE SODIUM 40 MG: 40 TABLET, DELAYED RELEASE ORAL at 08:16

## 2025-02-05 RX ADMIN — SENNOSIDES AND DOCUSATE SODIUM 1 TABLET: 8.6; 5 TABLET ORAL at 08:16

## 2025-02-05 RX ADMIN — CLOPIDOGREL BISULFATE 75 MG: 75 TABLET ORAL at 08:16

## 2025-02-05 RX ADMIN — ACETAMINOPHEN 1000 MG: 500 TABLET ORAL at 07:42

## 2025-02-05 RX ADMIN — CARVEDILOL 12.5 MG: 12.5 TABLET, FILM COATED ORAL at 08:16

## 2025-02-05 RX ADMIN — TAMSULOSIN HYDROCHLORIDE 0.8 MG: 0.4 CAPSULE ORAL at 08:16

## 2025-02-05 RX ADMIN — ATORVASTATIN CALCIUM 40 MG: 40 TABLET, FILM COATED ORAL at 08:16

## 2025-02-05 RX ADMIN — SODIUM CHLORIDE: 900 INJECTION, SOLUTION INTRAVENOUS at 08:39

## 2025-02-05 RX ADMIN — LIDOCAINE: 4 PATCH TOPICAL at 08:39

## 2025-02-05 RX ADMIN — FUROSEMIDE 40 MG: 10 INJECTION, SOLUTION INTRAMUSCULAR; INTRAVENOUS at 07:40

## 2025-02-05 RX ADMIN — POTASSIUM CHLORIDE 40 MEQ: 1500 TABLET, EXTENDED RELEASE ORAL at 07:40

## 2025-02-05 RX ADMIN — OXYBUTYNIN CHLORIDE 5 MG: 5 TABLET ORAL at 08:16

## 2025-02-05 RX ADMIN — ASPIRIN 81 MG: 81 TABLET, CHEWABLE ORAL at 08:16

## 2025-02-05 RX ADMIN — AMLODIPINE BESYLATE 2.5 MG: 2.5 TABLET ORAL at 08:16
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
## 2025-02-05 NOTE — TELEPHONE ENCOUNTER
----- Message from Jennifer Truong DO sent at 2/5/2025 12:02 PM EST -----  Regarding: RE: Lab review  Please let him know kidney function labs look stable with slight improvement in sCr to 1.88 with no significant urine protein noted(this is a good thing).   He should have repeat BMP, UA, UpCr and microalb:Cr in 3 months and be seen by AP or me in 3-4 months. Thanks.  ----- Message -----  From: Vandana Aragon  Sent: 1/31/2025  12:03 PM EST  To: Jennifer Truong DO  Subject: Lab review                                       If patient labs can be reviewed to determine when pt should be seen for in office visit. Original follow up was cxl'd due to being out of office.

## 2025-02-05 NOTE — W.PN.CD
"Today's Communication / Plan"~"-"~"-: "~"Agree with repeat CBC."~"Consider changing amlodipine to nifedipine (a prior home medication)."~"Furosemide 40 mg PO daily."~"BMP in one week."~"If CBC shows stable H/H, he is likely stable for discharge."~"Impression / Plan"~"-"~"-: "~"Impression/Plan:  60 y/o male with HTN, HLD, AAA s/p repair (April 2024), renal artery aneurysm, CKD3 (baseline Cr 2.0), a history of pyelonephritis and multivessel CAD admitted for elective CABG."~"#Multivessel CAD"~"	-Chronic, progressive."~"	-Remained stable S/P CABG x4 (LIMA to LAD, SVG to D1, LRA to OM1, SVG to OM2) with Dr. Jain, 1/31/2025."~"	-Continue incentive spirometry."~"	-Continue amlodipine (LRA patency), aspirin, atorvastatin, carvedilol 12.5 mg BID"~"	-Agree with holding amiodarone in setting of bradycardia, no issues with post-op Afib."~"	-Furosemide 40 mg PO daily."~"	-Incentive spirometry."~"	-Hbg drop noted.  Repeat CBC ordered."~"#Fever, resolved"~"	-Acute, resolved.."~"	-US negative DVT/VTE."~"	-UA negative for infection."~"	-No evidence of wound infection."~"	-No cefazolin since 2/1/2025 (drug fever)."~"	-Most likely atelectasis, will continue to resolve with incentive spirometry."~"#HTN, controlled"~"	-Chronic, hypertensive this morning."~"	-Consider changing amlodipine to nifedipine (home medication)."~"	-No ACEi/ARB secondary to CKD."~"#CKD"~"	-Chronic, stable.  Cr 2.2 (back to baseline)."~"	-Avoid nephrotoxic medications."~"	-Trend with diuresis."~"	-Outpatient BMP in one week to monitor renal function."~"#HLD"~"	-Chronic, stable."~"	-Continue atorvastatin 40 mg daily. "~"	-Goal LDL &lt; 55."~"Subjective/Interval History:"~"Weight down 0.8 kg today.  Near baseline."~"Afebrile since 2/2/2025."~"Systolic blood pressure elevated (160) this morning."~"Hbg 8.4 this morning (9.0 yesterday)."~"CTS looking towards discharge."~"DATA:"~"Cardiac Catheterization, 1/6/2025:"~"CONCLUSIONS"~"1.  Right dominant circulation with a chronic total occlusion of the proximal RCA, a 70% lesion in the proximal margin of OM1, an 80% lesion in the ostial circumflex, chronic total occlusion of OM1, and 80%, shelflike plaque in the proximal LAD and "~"a tapering, 90% lesion in the mid LAD."~"2.  Top normal filling pressures (LVEDP = 15 mmHg at 98.0 kg)."~"3.  Chronic kidney disease (baseline creatinine = 1.96).  Contrast volume = 25 mL."~"PFTs, 1/16/2025:"~"Impression:"~"No evidence of obstruction.  Mild restrictive lung defect.  Mild gas exchange capacity which is normal when accounting for alveolar volume involved in gas exchange.   "~"Intraprocedural MO, 1/31/2025:"~"CONCLUSIONS"~" Overall LVEF is approximately 60% with no RWMA."~" Moderate concentric left ventricular hypertrophy. "~" All valves appear to be functioning normally."~" Severe mobile atheroma seen in the descending aorta and distal aortic arch."~" The proximal ascending aorta shows calcification from the ST junction to 1-2 cm "~" distal anteriorly."~"POST OPERATIVE FINDINGS"~" The patient underwent a CABG on bypass.  Postop rhythm remains sinus.  RV and "~" LV function are normal.  Overall LVEF is approximately 60% with no new RWMA.  "~" Mild TR.  AV and PV function appear normal.  Trace MR.  Aortic scan is "~" unchanged.  Mobile atheroma are still seen in the descending aorta and distal "~" arch."~"Physical Exam"~"Vital Signs/Labs"~"-: "~"Vital Signs"~"Temp	Pulse	Resp	BP	Pulse Ox"~" 36.3 C 	 63 	 18 	 160/87 	 95 "~" 02/05/25 08:00	 02/05/25 10:00	 02/05/25 08:00	 02/05/25 07:42	 02/05/25 08:00"~"	02/03/25	02/04/25	02/05/25"~"	11:59	11:59	11:59"~"Actual Weight	98.4 kg	97.3 kg	96.1 kg"~"02/05/25 02:55 "~"PT	 18.3 Sec (11.4-14.6)  H 	01/31/25  15:05    "~"INR	 1.49  	01/31/25  15:05    "~"APTT	 31.7 Sec (23.4-35.0)  	01/31/25  15:05    "~"Magnesium	 2.3 mg/dl (1.6-2.3)  	02/05/25  02:55    "~"Physical Exam"~"Constitutional: No acute distress and Comfortable"~"EENT: Anicteric and Moist mucous membranes"~"Cardiovascular: Rhythm & rate is regular, Pedal edema is absent, JVD pressure is normal, S1S2 is normal and Murmur/rub/gallop absent"~"Respiratory: Respiratory effort normal, Lungs clear to auscul., Wheeze Absent and Crackles Absent"~"GI: Soft, Distention absent, Flat, Non tender and Normal bowel sounds"~"Neuro/Psych: AO x 3"~"Data Reviewed"~"-"~"-: "~"Date of Service:  February 5, 2025"~"Medical Decision Making: Reviewed Test Results, Independent Historian Assessment and Test Interpretation"~"EKG: Tracing Personally Visualized and interpreted and Report Reviewed by me"~"Echo: Report Reviewed by me"~"X-Ray/CT/US/MRI/NUC/PET: Image Personally Visualized and interpreted and Report Reviewed by me"~"Medical Tests (PFT, Pathology etc): Image Personally Visualized and interpreted and Report Reviewed by me"~"Labs: Labs Reviewed by me"~"Old Records: Reviewed"

## 2025-02-05 NOTE — TELEPHONE ENCOUNTER
Unable to LM for pt with lab results and to schedule pt for follow up appointment in the QO with Alexi or an AP     Labs will be ordered once pt is scheduled for follow up           Regarding: RE: Lab review  Please let him know kidney function labs look stable with slight improvement in sCr to 1.88 with no significant urine protein noted(this is a good thing).   He should have repeat BMP, UA, UpCr and microalb:Cr in 3 months and be seen by AP or me in 3-4 months. Thanks.

## 2025-02-05 NOTE — PTCARENOTE
"pt received from previous RN, oriented, OOB in chair. SR on the monitor, HR 60-80s. SBP 160s. palpable pulses, trace UE and LE edema. pt on RA, 95% POX. lungs clear, diminished in bases. IS encouraged. IS encouraged. pt abdomen s/n, denies n/v. +BM "~"today. voids. ambulates. sternal aquacel in place. CT sutures JOSE ALBERTO. R groin JOSE ALBERTO. RLE incision JOSE ALBERTO. L radial site incision JOSE ALBERTO, ecchymotic. PIV. see worklist for VS, I&O, and assessment. "

## 2025-02-05 NOTE — PTCARENOTE
Meds given as ordered. Report to DONNA Shearer. Pt remains in SR. Helped to toilet to have a BM and void. No c/.o pain.

## 2025-02-05 NOTE — PTCARENOTE
"pt discharged home w/ mom. sternal dressing removed, IV and tele dc'd, pt showered independently. dressed self independently. discharge instructions reviewed w/ patient and mother. questions answered. home meds reviewed with patient and mother. "~"patient and mother aware of 48oz fluid restriction. pt left floor w/ all belongings via wheelchair by volunteer escort. "

## 2025-02-07 ENCOUNTER — TELEPHONE (OUTPATIENT)
Dept: NEPHROLOGY | Facility: HOSPITAL | Age: 60
End: 2025-02-07

## 2025-02-07 NOTE — TELEPHONE ENCOUNTER
Placed call to pt and was able to relay results to pt and schedule 4 mon fu - pt was advised of labs to have completed in 3 months - nothing further needed at this time

## 2025-02-12 DIAGNOSIS — R35.1 BENIGN PROSTATIC HYPERPLASIA WITH NOCTURIA: ICD-10-CM

## 2025-02-12 DIAGNOSIS — N40.1 BENIGN PROSTATIC HYPERPLASIA WITH NOCTURIA: ICD-10-CM

## 2025-02-12 RX ORDER — OXYBUTYNIN CHLORIDE 10 MG/1
10 TABLET, EXTENDED RELEASE ORAL
Qty: 90 TABLET | Refills: 3 | Status: SHIPPED | OUTPATIENT
Start: 2025-02-12

## 2025-02-12 NOTE — TELEPHONE ENCOUNTER
Reason for call:   [x] Refill   [] Prior Auth  [x] Other: NOT A DUPLICATE, change in pharmacies    Office:   [] PCP/Provider -   [x] Specialty/Provider - :uro/ Eveline Iyer PA-C     Medication:     oxybutynin (DITROPAN-XL) 10 MG 24 hr tablet       Dose/Frequency:     Take 1 tablet (10 mg total) by mouth daily at bedtime       Quantity: 90    Pharmacy: [id number] - [place] - [address] [phone]     Does the patient have enough for 3 days?   [x] Yes   [] No - Send as HP to POD

## 2025-02-20 ENCOUNTER — TELEPHONE (OUTPATIENT)
Age: 60
End: 2025-02-20

## 2025-02-20 NOTE — TELEPHONE ENCOUNTER
Pt calling for a sooner appt. He was told by his PCP that his creatinine levels are too high.   No appts available . Please advised.     Pt stated he had labs done on 2/11/25. Report not in the chart.

## 2025-02-21 NOTE — TELEPHONE ENCOUNTER
Placed call to pt , advised I requested labs from [facility] , Dr. Truong reviewed labs , wants labs repeated in 2 weeks to reevaluate, pt still CKD 3b, Dr. Truong is aware pt had heart surgery end of Jan, I advised pt to contact the office with any additional concerns - pt made aware appointment has been placed on wait list for sooner appt - pt thanked for return call

## 2025-02-21 NOTE — TELEPHONE ENCOUNTER
Patient called back  please see prior  message he is concern about  his lab results. He would like a call back.

## 2025-03-03 ENCOUNTER — OFFICE VISIT (OUTPATIENT)
Dept: CARDIOLOGY CLINIC | Facility: CLINIC | Age: 60
End: 2025-03-03
Payer: COMMERCIAL

## 2025-03-03 VITALS
HEIGHT: 70 IN | WEIGHT: 207 LBS | BODY MASS INDEX: 29.63 KG/M2 | DIASTOLIC BLOOD PRESSURE: 84 MMHG | SYSTOLIC BLOOD PRESSURE: 126 MMHG | HEART RATE: 62 BPM

## 2025-03-03 DIAGNOSIS — E78.2 MIXED HYPERLIPIDEMIA: Primary | ICD-10-CM

## 2025-03-03 DIAGNOSIS — I10 PRIMARY HYPERTENSION: ICD-10-CM

## 2025-03-03 DIAGNOSIS — I71.43 INFRARENAL ABDOMINAL AORTIC ANEURYSM (AAA) WITHOUT RUPTURE (HCC): ICD-10-CM

## 2025-03-03 PROCEDURE — 99214 OFFICE O/P EST MOD 30 MIN: CPT | Performed by: INTERNAL MEDICINE

## 2025-03-03 PROCEDURE — 93000 ELECTROCARDIOGRAM COMPLETE: CPT | Performed by: INTERNAL MEDICINE

## 2025-03-03 RX ORDER — NITROGLYCERIN 0.4 MG/1
TABLET SUBLINGUAL
COMMUNITY
Start: 2025-01-06

## 2025-03-03 RX ORDER — FUROSEMIDE 40 MG/1
TABLET ORAL
COMMUNITY
Start: 2025-02-05

## 2025-03-03 RX ORDER — PANTOPRAZOLE SODIUM 40 MG/1
TABLET, DELAYED RELEASE ORAL EVERY 24 HOURS
COMMUNITY
Start: 2025-02-06

## 2025-03-03 RX ORDER — CARVEDILOL 12.5 MG/1
TABLET ORAL
COMMUNITY
Start: 2025-02-05

## 2025-03-03 RX ORDER — OXYCODONE HYDROCHLORIDE 5 MG/1
TABLET ORAL
COMMUNITY
Start: 2025-02-05

## 2025-03-03 RX ORDER — POTASSIUM CHLORIDE 1500 MG/1
TABLET, EXTENDED RELEASE ORAL
COMMUNITY
Start: 2025-02-05

## 2025-03-03 RX ORDER — CLOPIDOGREL BISULFATE 75 MG/1
TABLET ORAL
COMMUNITY
Start: 2025-02-05

## 2025-03-03 RX ORDER — GABAPENTIN 100 MG/1
CAPSULE ORAL
COMMUNITY
Start: 2025-02-07

## 2025-03-03 RX ORDER — AMLODIPINE BESYLATE 2.5 MG/1
TABLET ORAL
COMMUNITY
Start: 2025-02-05
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
## 2025-03-03 NOTE — PATIENT INSTRUCTIONS
Recommendations:  Continue current medications.  Patient to see cardiac surgeon tomorrow.   Follow up in 3 months.

## 2025-03-17 ENCOUNTER — OFFICE VISIT (OUTPATIENT)
Dept: NEPHROLOGY | Facility: HOSPITAL | Age: 60
End: 2025-03-17
Payer: COMMERCIAL

## 2025-03-17 ENCOUNTER — TELEPHONE (OUTPATIENT)
Dept: CARDIOLOGY CLINIC | Facility: CLINIC | Age: 60
End: 2025-03-17

## 2025-03-17 ENCOUNTER — TELEPHONE (OUTPATIENT)
Age: 60
End: 2025-03-17

## 2025-03-17 VITALS
SYSTOLIC BLOOD PRESSURE: 128 MMHG | HEIGHT: 70 IN | WEIGHT: 208 LBS | BODY MASS INDEX: 29.78 KG/M2 | HEART RATE: 66 BPM | DIASTOLIC BLOOD PRESSURE: 82 MMHG

## 2025-03-17 DIAGNOSIS — D64.9 ANEMIA, UNSPECIFIED TYPE: ICD-10-CM

## 2025-03-17 DIAGNOSIS — I71.43 INFRARENAL ABDOMINAL AORTIC ANEURYSM (AAA) WITHOUT RUPTURE (HCC): ICD-10-CM

## 2025-03-17 DIAGNOSIS — Z72.0 TOBACCO USE: ICD-10-CM

## 2025-03-17 DIAGNOSIS — I10 PRIMARY HYPERTENSION: ICD-10-CM

## 2025-03-17 DIAGNOSIS — N18.32 STAGE 3B CHRONIC KIDNEY DISEASE (CKD) (HCC): Primary | ICD-10-CM

## 2025-03-17 PROCEDURE — 99214 OFFICE O/P EST MOD 30 MIN: CPT | Performed by: INTERNAL MEDICINE

## 2025-03-17 NOTE — TELEPHONE ENCOUNTER
Tried to call pt in regards to upcoming cardiology appt with Dr. Alvarado on 3/24/25.     Pt seen Dr. Alvarado on 3/3/25 and is not due to be seen for another 3 months.     I want to make sure pt is aware of this appt and the appt was not made for any specific reason/concern.     Will MC pt as well.

## 2025-03-17 NOTE — TELEPHONE ENCOUNTER
Patient called stating he is on centrum silver vitamin, carvedilol 12.5mg 2x a day, oxybutynin 10mg 1x day, amlodipine besylate 2.5mg 1x day, atorvastatin 40mg 1x day, tamsulosin 0.4mg capsule 2 at dinner time, baby aspirin.

## 2025-03-17 NOTE — PROGRESS NOTES
NEPHROLOGY OUTPATIENT PROGRESS NOTE   Sourav Saul 59 y.o. male MRN: [id number]  DATE: 3/17/2025  Reason for visit:   Chief Complaint   Patient presents with    Chronic Kidney Disease    Follow-up        Patient Instructions   Stage 3b chronic kidney disease (CKD) ([facility])  CKD stage 3b in setting of hypertensive nephrosclerosis - suspect baseline sCr low 2s, with last sCr 1.88 as of 1/15/25, improved  -UA with microscopy showed small blood, trace protein, 4-10 RBCs, without bacteria or protein  -repeat UA with microscopy  -no significant proteinuria noted, microalb:Cr 56mg/g  -renal u/s showed normal kidneys as of Feb. 2024.  -repeat BMP in 3 months  -CKD education booklet provided in office   -stay well hydrated  -follow low sodium diet  -refer to kidney smart education class  -recommend increased hydration as sNa high normal at 144 on last bloodwork   -phos/mag/PTH normal as of Jan. 2025  Primary hypertension   - BP well controlled  -continue amlodipine 2.5mg daily, coreg 12.5mg twice daily, flomax  -avoid high salt/sodium diet  -avoid caffeine  Anemia, unspecified type  - Hgb 13.7, resolved  Tobacco use  -encourage cessation  Infrarenal abdominal aortic aneurysm (AAA) without rupture ([facility])  -7.6cm distal AAA s/p repair 4/5/24   -continue vascular follow up, BP acceptable     Check if you are taking furosemide(lasix) and potassium at home.  RTC in 4-6 months.       Assessment & Plan  Stage 3b chronic kidney disease (CKD) ([facility])  CKD stage 3b in setting of hypertensive nephrosclerosis - suspect baseline sCr low 2s, with last sCr 1.88 as of 1/15/25, improved  -UA with microscopy showed small blood, trace protein, 4-10 RBCs, without bacteria or protein  -repeat UA with microscopy  -no significant proteinuria noted, microalb:Cr 56mg/g  -renal u/s showed normal kidneys as of Feb. 2024.  -repeat BMP in 3 months  -CKD education booklet provided in office   -stay well hydrated  -follow low sodium diet  -refer to kidney  "smart education class  -recommend increased hydration as sNa high normal at 144 on last bloodwork   -phos/mag/PTH normal as of Jan. 2025  Primary hypertension   - BP well controlled  -continue amlodipine 2.5mg daily, coreg 12.5mg twice daily, flomax  -avoid high salt/sodium diet  -avoid caffeine  Anemia, unspecified type  - Hgb 13.7, resolved  Tobacco use  -encourage cessation  Infrarenal abdominal aortic aneurysm (AAA) without rupture (HCC)  -7.6cm distal AAA s/p repair 4/5/24   -continue vascular follow up, BP acceptable    Check if you are taking furosemide(lasix) and potassium at home.  RTC in 4-6 months.     SUBJECTIVE / INTERVAL HISTORY:  59 y.o. male presents in follow up of CKD.     Sourav Saul denies any recent illness/hospitalizations/medication changes since last office visit.    Denies NSAID use.  Drinks water.   Used to smoke every hour but so far today has not had a cigarette. Some days he has 2 or 3 or most days has only 1.  Not working now. On disability.    Review of Systems   Constitutional:  Negative for chills and fever.   HENT:  Negative for sore throat.    Eyes:  Negative for visual disturbance.   Respiratory:  Negative for cough and shortness of breath.    Cardiovascular:  Positive for chest pain. Negative for leg swelling.   Gastrointestinal:  Negative for abdominal pain, constipation, diarrhea, nausea and vomiting.   Endocrine: Negative for polyuria.   Genitourinary:  Negative for decreased urine volume, difficulty urinating, dysuria and hematuria.   Musculoskeletal:  Negative for back pain and myalgias.   Skin:  Negative for rash.   Neurological:  Negative for dizziness, light-headedness and numbness.   Psychiatric/Behavioral:  Negative for confusion.        OBJECTIVE:  /82 (BP Location: Left arm, Patient Position: Sitting, Cuff Size: Standard)   Pulse 66   Ht 5' 10\" (1.778 m)   Wt 94.3 kg (208 lb)   BMI 29.84 kg/m²  Body mass index is 29.84 kg/m².    Physical exam:  Physical " Exam  Vitals reviewed.   Constitutional:       General: He is not in acute distress.     Appearance: Normal appearance. He is well-developed. He is not diaphoretic.   HENT:      Head: Normocephalic and atraumatic.      Nose: Nose normal.      Mouth/Throat:      Mouth: Mucous membranes are dry.      Pharynx: No oropharyngeal exudate.   Eyes:      General: No scleral icterus.        Right eye: No discharge.         Left eye: No discharge.   Neck:      Thyroid: No thyromegaly.   Cardiovascular:      Rate and Rhythm: Normal rate and regular rhythm.      Heart sounds: Normal heart sounds.   Pulmonary:      Effort: Pulmonary effort is normal.      Breath sounds: Normal breath sounds. No wheezing or rales.   Abdominal:      General: Bowel sounds are normal. There is no distension.      Palpations: Abdomen is soft.      Tenderness: There is no abdominal tenderness.   Musculoskeletal:         General: No swelling. Normal range of motion.      Cervical back: Neck supple.   Lymphadenopathy:      Cervical: No cervical adenopathy.   Skin:     General: Skin is warm and dry.      Coloration: Skin is not jaundiced.      Findings: No rash.   Neurological:      General: No focal deficit present.      Mental Status: He is alert.      Comments: awake   Psychiatric:         Mood and Affect: Mood normal.         Behavior: Behavior normal.         Medications:    Current Outpatient Medications:     acetaminophen (TYLENOL) 325 mg tablet, Take 2 tablets (650 mg total) by mouth every 6 (six) hours as needed for mild pain, Disp: , Rfl:     amLODIPine (NORVASC) 2.5 mg tablet, TAKE 1 TABLET BY MOUTH DAILY FOR RADIAL GRAFT PATENTCY, Disp: , Rfl:     aspirin 81 mg chewable tablet, Chew 1 tablet (81 mg total) daily, Disp: , Rfl:     atorvastatin (LIPITOR) 40 mg tablet, Take 1 tablet (40 mg total) by mouth daily, Disp: 90 tablet, Rfl: 4    carvedilol (COREG) 12.5 mg tablet, take 1 tablet by mouth 2 times a day for blood pressure, Disp: , Rfl:      clopidogrel (PLAVIX) 75 mg tablet, TAKE 1 TABLET BY MOUTH DAILY FOR GRAFT PATENTCY, Disp: , Rfl:     Multiple Vitamin (Multivitamin Adult) TABS, Take by mouth, Disp: , Rfl:     oxybutynin (DITROPAN-XL) 10 MG 24 hr tablet, Take 1 tablet (10 mg total) by mouth daily at bedtime, Disp: 90 tablet, Rfl: 3    pantoprazole (PROTONIX) 40 mg tablet, every 24 hours, Disp: , Rfl:     Potassium Chloride ER 20 MEQ TBCR, TAKE 1 TABLET BY MOUTH DAILY FOR ELECTROLYTE REPLETION, Disp: , Rfl:     tamsulosin (FLOMAX) 0.4 mg, Take 2 capsules (0.8 mg total) by mouth daily with dinner, Disp: 180 capsule, Rfl: 1    furosemide (LASIX) 40 mg tablet, TAKE 1 TABLET BY MOUTH DAILY FOR FLUID RETENTION/SWELLING (Patient not taking: Reported on 3/17/2025), Disp: , Rfl:     gabapentin (NEURONTIN) 100 mg capsule, take 1 capsule by mouth three times a day for 10 days (Patient not taking: Reported on 3/17/2025), Disp: , Rfl:     methocarbamol (ROBAXIN) 500 mg tablet, Take 1 tablet (500 mg total) by mouth every 6 (six) hours (Patient not taking: Reported on 4/29/2024), Disp: 30 tablet, Rfl: 0    nitroglycerin (NITROSTAT) 0.4 mg SL tablet, TAKE 1 TABLET BY MOUTH EVERY 5 MINUTES FOR CHEST PAIN UP TO 3 TIMES AS NEEDED FOR CHEST PAIN (Patient not taking: Reported on 3/17/2025), Disp: , Rfl:     oxyCODONE (ROXICODONE) 5 immediate release tablet, 1/2 tablet (dose 2.5mg) Orally q6h prn severe pain (Patient not taking: Reported on 3/17/2025), Disp: , Rfl:     Allergies:  Allergies as of 03/17/2025    (No Known Allergies)       The following portions of the patient's history were reviewed and updated as appropriate: past family history, past surgical history and problem list.    Laboratory Results:  Lab Results   Component Value Date    SODIUM 144 09/20/2024    K 4.4 09/20/2024     (H) 09/20/2024    CO2 27 09/20/2024    BUN 23 09/20/2024    CREATININE 2.16 (H) 09/20/2024    GLUC 103 05/01/2024    CALCIUM 8.9 09/20/2024        Lab Results   Component Value  "Date    PTH 44.9 09/20/2024    CALCIUM 8.9 09/20/2024    PHOS 3.4 09/20/2024       Portions of the record may have been created with voice recognition software.  Occasional wrong word or \"sound a like\" substitutions may have occurred due to the inherent limitations of voice recognition software.  Read the chart carefully and recognize, using context, where substitutions have occurred.  "

## 2025-03-17 NOTE — TELEPHONE ENCOUNTER
Phone call from patient seen in the office today calling to report medications. Transferred to [facility] for further discussion.

## 2025-03-17 NOTE — ASSESSMENT & PLAN NOTE
CKD stage 3b in setting of hypertensive nephrosclerosis - suspect baseline sCr low 2s, with last sCr 1.88 as of 1/15/25, improved  -UA with microscopy showed small blood, trace protein, 4-10 RBCs, without bacteria or protein  -repeat UA with microscopy  -no significant proteinuria noted, microalb:Cr 56mg/g  -renal u/s showed normal kidneys as of Feb. 2024.  -repeat BMP in 3 months  -CKD education booklet provided in office   -stay well hydrated  -follow low sodium diet  -refer to kidney smart education class  -recommend increased hydration as sNa high normal at 144 on last bloodwork   -phos/mag/PTH normal as of Jan. 2025

## 2025-03-17 NOTE — PATIENT INSTRUCTIONS
Stage 3b chronic kidney disease (CKD) ([facility])  CKD stage 3b in setting of hypertensive nephrosclerosis - suspect baseline sCr low 2s, with last sCr 1.88 as of 1/15/25, improved  -UA with microscopy showed small blood, trace protein, 4-10 RBCs, without bacteria or protein  -repeat UA with microscopy  -no significant proteinuria noted, microalb:Cr 56mg/g  -renal u/s showed normal kidneys as of Feb. 2024.  -repeat BMP in 3 months  -CKD education booklet provided in office   -stay well hydrated  -follow low sodium diet  -refer to kidney smart education class  -recommend increased hydration as sNa high normal at 144 on last bloodwork   -phos/mag/PTH normal as of Jan. 2025  Primary hypertension   - BP well controlled  -continue amlodipine 2.5mg daily, coreg 12.5mg twice daily, flomax  -avoid high salt/sodium diet  -avoid caffeine  Anemia, unspecified type  - Hgb 13.7, resolved  Tobacco use  -encourage cessation  Infrarenal abdominal aortic aneurysm (AAA) without rupture (HCC)  -7.6cm distal AAA s/p repair 4/5/24   -continue vascular follow up, BP acceptable     Check if you are taking furosemide(lasix) and potassium at home.  Obtain blood and urine testing next month.  RTC in 4-6 months.

## 2025-03-24 ENCOUNTER — OFFICE VISIT (OUTPATIENT)
Dept: CARDIOLOGY CLINIC | Facility: CLINIC | Age: 60
End: 2025-03-24
Payer: COMMERCIAL

## 2025-03-24 VITALS
HEART RATE: 73 BPM | WEIGHT: 208 LBS | HEIGHT: 70 IN | BODY MASS INDEX: 29.78 KG/M2 | SYSTOLIC BLOOD PRESSURE: 130 MMHG | DIASTOLIC BLOOD PRESSURE: 88 MMHG

## 2025-03-24 DIAGNOSIS — Z95.1 HX OF CABG: ICD-10-CM

## 2025-03-24 DIAGNOSIS — I10 PRIMARY HYPERTENSION: ICD-10-CM

## 2025-03-24 DIAGNOSIS — I25.10 CORONARY ARTERY DISEASE INVOLVING NATIVE CORONARY ARTERY OF NATIVE HEART WITHOUT ANGINA PECTORIS: ICD-10-CM

## 2025-03-24 DIAGNOSIS — E78.2 MIXED HYPERLIPIDEMIA: ICD-10-CM

## 2025-03-24 DIAGNOSIS — I71.43 INFRARENAL ABDOMINAL AORTIC ANEURYSM (AAA) WITHOUT RUPTURE (HCC): Primary | ICD-10-CM

## 2025-03-24 PROCEDURE — 99214 OFFICE O/P EST MOD 30 MIN: CPT | Performed by: INTERNAL MEDICINE
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
## 2025-03-27 LAB
ALBUMIN/CREAT UR: 24 MG/G CREAT (ref 0–29)
APPEARANCE UR: CLEAR
BACTERIA URNS QL MICRO: NORMAL
BILIRUB UR QL STRIP: NEGATIVE
BUN SERPL-MCNC: 19 MG/DL (ref 6–24)
BUN/CREAT SERPL: 9 (ref 9–20)
CALCIUM SERPL-MCNC: 9.3 MG/DL (ref 8.7–10.2)
CASTS URNS QL MICRO: NORMAL /LPF
CHLORIDE SERPL-SCNC: 106 MMOL/L (ref 96–106)
CO2 SERPL-SCNC: 23 MMOL/L (ref 20–29)
COLOR UR: YELLOW
CREAT SERPL-MCNC: 2.21 MG/DL (ref 0.76–1.27)
CREAT UR-MCNC: 175.3 MG/DL
EGFR: 33 ML/MIN/1.73
EPI CELLS #/AREA URNS HPF: NORMAL /HPF (ref 0–10)
GLUCOSE SERPL-MCNC: 81 MG/DL (ref 70–99)
GLUCOSE UR QL: NEGATIVE
HGB UR QL STRIP: NEGATIVE
KETONES UR QL STRIP: NEGATIVE
LEUKOCYTE ESTERASE UR QL STRIP: NEGATIVE
MICRO URNS: NORMAL
MICRO URNS: NORMAL
MICROALBUMIN UR-MCNC: 41.8 UG/ML
NITRITE UR QL STRIP: NEGATIVE
PH UR STRIP: 6.5 [PH] (ref 5–7.5)
POTASSIUM SERPL-SCNC: 4.1 MMOL/L (ref 3.5–5.2)
PROT UR QL STRIP: NORMAL
PROT UR-MCNC: 21.5 MG/DL
PROT/CREAT UR: 123 MG/G CREAT (ref 0–200)
RBC #/AREA URNS HPF: NORMAL /HPF (ref 0–2)
SODIUM SERPL-SCNC: 145 MMOL/L (ref 134–144)
SP GR UR: 1.02 (ref 1–1.03)
UROBILINOGEN UR STRIP-ACNC: 0.2 MG/DL (ref 0.2–1)
WBC #/AREA URNS HPF: NORMAL /HPF (ref 0–5)

## 2025-03-28 ENCOUNTER — RESULTS FOLLOW-UP (OUTPATIENT)
Dept: OTHER | Facility: HOSPITAL | Age: 60
End: 2025-03-28

## 2025-03-28 DIAGNOSIS — N18.32 STAGE 3B CHRONIC KIDNEY DISEASE (HCC): Primary | ICD-10-CM

## 2025-03-29 ENCOUNTER — HOSPITAL ENCOUNTER (OUTPATIENT)
Dept: HOSPITAL 99 - RAD | Age: 60
End: 2025-03-29
Payer: COMMERCIAL

## 2025-03-29 DIAGNOSIS — I72.3: Primary | ICD-10-CM

## 2025-04-08 ENCOUNTER — DOCUMENTATION (OUTPATIENT)
Dept: NEPHROLOGY | Facility: CLINIC | Age: 60
End: 2025-04-08

## 2025-04-08 LAB
EXT GLUCOSE BLD: 71
EXTERNAL BUN: 23
EXTERNAL CALCIUM: 9.2
EXTERNAL CHLORIDE: 106
EXTERNAL CO2: 22
EXTERNAL CREATININE: 2.14
EXTERNAL POTASSIUM: 4.4
EXTERNAL SODIUM: 142

## 2025-06-12 ENCOUNTER — TELEPHONE (OUTPATIENT)
Dept: NEPHROLOGY | Facility: CLINIC | Age: 60
End: 2025-06-12

## 2025-06-12 NOTE — TELEPHONE ENCOUNTER
Spoke with Ed  regarding your upcoming appointment on 8/4/2025 with Enedelia Turner in [place].   Unfortunately, due to a change in the provider's schedule we need to change your appointment.    Was able to move to Monday 7/14.

## 2025-07-01 DIAGNOSIS — R35.1 BENIGN PROSTATIC HYPERPLASIA WITH NOCTURIA: ICD-10-CM

## 2025-07-01 DIAGNOSIS — N40.1 BENIGN PROSTATIC HYPERPLASIA WITH NOCTURIA: ICD-10-CM

## 2025-07-02 ENCOUNTER — TELEPHONE (OUTPATIENT)
Age: 60
End: 2025-07-02

## 2025-07-02 RX ORDER — TAMSULOSIN HYDROCHLORIDE 0.4 MG/1
0.8 CAPSULE ORAL
Qty: 60 CAPSULE | Refills: 0 | Status: SHIPPED | OUTPATIENT
Start: 2025-07-02

## 2025-07-17 ENCOUNTER — TELEPHONE (OUTPATIENT)
Dept: UROLOGY | Facility: HOSPITAL | Age: 60
End: 2025-07-17

## 2025-07-17 ENCOUNTER — APPOINTMENT (OUTPATIENT)
Dept: LAB | Facility: HOSPITAL | Age: 60
End: 2025-07-17
Payer: COMMERCIAL

## 2025-07-17 ENCOUNTER — HOSPITAL ENCOUNTER (OUTPATIENT)
Dept: NON INVASIVE DIAGNOSTICS | Age: 60
Discharge: HOME | End: 2025-07-17
Payer: COMMERCIAL

## 2025-07-17 DIAGNOSIS — Z12.5 SCREENING FOR PROSTATE CANCER: ICD-10-CM

## 2025-07-17 DIAGNOSIS — N18.32 STAGE 3B CHRONIC KIDNEY DISEASE (HCC): ICD-10-CM

## 2025-07-17 DIAGNOSIS — I72.4 POPLITEAL ARTERY ANEURYSM, BILATERAL (HCC): ICD-10-CM

## 2025-07-17 LAB — PSA SERPL-MCNC: 1.6 NG/ML (ref 0–4)

## 2025-07-17 PROCEDURE — 93923 UPR/LXTR ART STDY 3+ LVLS: CPT

## 2025-07-17 PROCEDURE — G0103 PSA SCREENING: HCPCS

## 2025-07-17 PROCEDURE — 93923 UPR/LXTR ART STDY 3+ LVLS: CPT | Performed by: SURGERY

## 2025-07-17 PROCEDURE — 36415 COLL VENOUS BLD VENIPUNCTURE: CPT

## 2025-07-17 PROCEDURE — 93925 LOWER EXTREMITY STUDY: CPT

## 2025-07-17 PROCEDURE — 93925 LOWER EXTREMITY STUDY: CPT | Performed by: SURGERY

## 2025-07-17 NOTE — TELEPHONE ENCOUNTER
Spoke to patient regarding obtaining psa lab work prior to upcoming appointment that is scheduled 7/23/25

## 2025-07-18 ENCOUNTER — APPOINTMENT (OUTPATIENT)
Dept: LAB | Facility: HOSPITAL | Age: 60
End: 2025-07-18
Payer: COMMERCIAL

## 2025-07-18 ENCOUNTER — TELEPHONE (OUTPATIENT)
Age: 60
End: 2025-07-18

## 2025-07-18 ENCOUNTER — TELEPHONE (OUTPATIENT)
Dept: VASCULAR SURGERY | Facility: CLINIC | Age: 60
End: 2025-07-18

## 2025-07-18 ENCOUNTER — TELEPHONE (OUTPATIENT)
Dept: NEPHROLOGY | Facility: HOSPITAL | Age: 60
End: 2025-07-18

## 2025-07-18 DIAGNOSIS — N18.32 STAGE 3B CHRONIC KIDNEY DISEASE (HCC): ICD-10-CM

## 2025-07-18 LAB
ANION GAP SERPL CALCULATED.3IONS-SCNC: 7 MMOL/L (ref 4–13)
BASOPHILS # BLD AUTO: 0.07 THOUSANDS/ÂΜL (ref 0–0.1)
BASOPHILS NFR BLD AUTO: 1 % (ref 0–1)
BUN SERPL-MCNC: 21 MG/DL (ref 5–25)
CALCIUM SERPL-MCNC: 9.2 MG/DL (ref 8.4–10.2)
CHLORIDE SERPL-SCNC: 108 MMOL/L (ref 96–108)
CO2 SERPL-SCNC: 27 MMOL/L (ref 21–32)
CREAT SERPL-MCNC: 2.04 MG/DL (ref 0.6–1.3)
CREAT UR-MCNC: 91.3 MG/DL
CREAT UR-MCNC: 95 MG/DL
EOSINOPHIL # BLD AUTO: 0.33 THOUSAND/ÂΜL (ref 0–0.61)
EOSINOPHIL NFR BLD AUTO: 3 % (ref 0–6)
ERYTHROCYTE [DISTWIDTH] IN BLOOD BY AUTOMATED COUNT: 17.1 % (ref 11.6–15.1)
GFR SERPL CREATININE-BSD FRML MDRD: 34 ML/MIN/1.73SQ M
GLUCOSE SERPL-MCNC: 100 MG/DL (ref 65–140)
HCT VFR BLD AUTO: 41.8 % (ref 36.5–49.3)
HGB BLD-MCNC: 14 G/DL (ref 12–17)
IMM GRANULOCYTES # BLD AUTO: 0.08 THOUSAND/UL (ref 0–0.2)
IMM GRANULOCYTES NFR BLD AUTO: 1 % (ref 0–2)
LYMPHOCYTES # BLD AUTO: 2.93 THOUSANDS/ÂΜL (ref 0.6–4.47)
LYMPHOCYTES NFR BLD AUTO: 27 % (ref 14–44)
MAGNESIUM SERPL-MCNC: 2.2 MG/DL (ref 1.9–2.7)
MCH RBC QN AUTO: 27.5 PG (ref 26.8–34.3)
MCHC RBC AUTO-ENTMCNC: 33.5 G/DL (ref 31.4–37.4)
MCV RBC AUTO: 82 FL (ref 82–98)
MICROALBUMIN UR-MCNC: 13 MG/L
MICROALBUMIN/CREAT 24H UR: 14 MG/G CREATININE (ref 0–30)
MONOCYTES # BLD AUTO: 1.13 THOUSAND/ÂΜL (ref 0.17–1.22)
MONOCYTES NFR BLD AUTO: 11 % (ref 4–12)
NEUTROPHILS # BLD AUTO: 6.18 THOUSANDS/ÂΜL (ref 1.85–7.62)
NEUTS SEG NFR BLD AUTO: 57 % (ref 43–75)
NRBC BLD AUTO-RTO: 0 /100 WBCS
PHOSPHATE SERPL-MCNC: 4.4 MG/DL (ref 2.3–4.1)
PLATELET # BLD AUTO: 234 THOUSANDS/UL (ref 149–390)
PMV BLD AUTO: 10.4 FL (ref 8.9–12.7)
POTASSIUM SERPL-SCNC: 4.4 MMOL/L (ref 3.5–5.3)
PROT UR-MCNC: 11.9 MG/DL
PROT/CREAT UR: 0.1 MG/G{CREAT}
PTH-INTACT SERPL-MCNC: 20.9 PG/ML (ref 12–88)
RBC # BLD AUTO: 5.09 MILLION/UL (ref 3.88–5.62)
SODIUM SERPL-SCNC: 142 MMOL/L (ref 135–147)
WBC # BLD AUTO: 10.72 THOUSAND/UL (ref 4.31–10.16)

## 2025-07-18 PROCEDURE — 83970 ASSAY OF PARATHORMONE: CPT

## 2025-07-18 PROCEDURE — 80048 BASIC METABOLIC PNL TOTAL CA: CPT

## 2025-07-18 PROCEDURE — 36415 COLL VENOUS BLD VENIPUNCTURE: CPT

## 2025-07-18 PROCEDURE — 85025 COMPLETE CBC W/AUTO DIFF WBC: CPT

## 2025-07-18 PROCEDURE — 83735 ASSAY OF MAGNESIUM: CPT

## 2025-07-18 PROCEDURE — 84156 ASSAY OF PROTEIN URINE: CPT

## 2025-07-18 PROCEDURE — 84100 ASSAY OF PHOSPHORUS: CPT

## 2025-07-18 PROCEDURE — 82043 UR ALBUMIN QUANTITATIVE: CPT

## 2025-07-18 PROCEDURE — 82570 ASSAY OF URINE CREATININE: CPT

## 2025-07-18 NOTE — TELEPHONE ENCOUNTER
Attempted to contact patient to schedule appointment(s) listed below.  Requested patient call [phone] to schedule appointment(s).    Patient's appointment(s) are due now.    Dopplers  [] Abdominal Aorta Iliac (AOIL)  [] Carotid (CV)   [] Celiac and/or Mesenteric  [] Endovascular Aortic Repair (EVAR)   [] Hemodialysis Access (HD)   [x] Lower Limb Arterial (PETTY) 7/17/25  [] Lower Limb Venous (LEV)  [] Lower Limb Venous Duplex with Reflux (LEVDR)  [] Renal Artery  [] Upper Limb Arterial (UEA)    [] Upper Limb Venous (UEV)              [] MICKI and Waveform analysis     Advanced Imaging   [] CTA head/neck    [] CTA abdomen    [] CTA abdomen & pelvis    [] CT abdomen with/ without contrast  [] CT abdomen with contrast  [] CT abdomen without contrast    [] CT abdomen & pelvis with/ without contrast  [] CT abdomen & pelvis with contrast  [] CT abdomen & pelvis without contrast    Office Visit   [] New patient, patient last seen over 3 years ago  [] Risk factor modification (RFM)   [x] Follow up 1 yr f/u LMD  [] Lost to follow up (LTFU)

## 2025-07-18 NOTE — TELEPHONE ENCOUNTER
Patient called stating he is at the lab and needs to know what labs need to be drawn today. Made aware to have labs drawn that were ordered by  Claire Wallace PA-C. Patient verbalized understanding.

## 2025-07-21 NOTE — PROGRESS NOTES
Name: Sourav Saul      : 1965      MRN: [id number]  Encounter Provider: BRANDI Hamilton  Encounter Date: 2025   Encounter department: [facility] [place]  :  Assessment & Plan  Stage 3b chronic kidney disease (HCC)  Current creatinine with most recent labs reviewed 2.04 with eGFR 34.  Baseline creatinine appears to be between 2.0-2.2 since  with peak of 2.61 in 2024  Renal US, 2024: No hydronephrosis, no visualized renal calculi, found incidental 7.4 AAA  CT C/A/P w/wo, 2024: Kidneys/Ureters: No solid enhancing renal mass. Mild left renal cortical atrophy. Dominant left interpolar region cortical cyst. No hydronephrosis or calculi   Not on ACEi/ARB/SGLT2i  Likely to hx of hypertensive nephrosclerosis  UA, 3/2025: [place]  UPCR: 0.1  Hemoglobin stable  Microalbumin: 13.0  Kidney Smart Class - Not taken. Book given to patient  Avoid NSAIDS - does not take per patient. Okay tylenol okay to take  Adequate hydration with water    Orders:    Basic metabolic panel; Future    CBC; Future    Urinalysis with microscopic; Future    Protein / creatinine ratio, urine; Future    Albumin / creatinine urine ratio; Future    Primary hypertension  Current blood pressure 126/80  Does not check blood pressure at home gets is frequently checked at cardiac rehab every other day  Volume status euvolemic  On Amlodipine 2.5 mg daily, carvedilol 12.5 mg BID , Flomax 0.8 mg daily in mary  Low salt/sodium diet       Chronic kidney disease-mineral and bone disorder (CKD-MBD)  PTH stable - 20.9  Calcium stable - 9.2  Phosphorus elevated - 4.4  Recommend low phosphorus diet. Low phosphorus education/handout provided to patient. CKD book given.  Magnesium stable - 2.2      Orders:    Magnesium; Future    Phosphorus; Future    Anemia, unspecified type  Hemoglobin stable       Tobacco use  Still smoking - smokes about 3-4 cigarettes a day. Previously quit smoking  Recommend cessation     "    Infrarenal abdominal aortic aneurysm (AAA) without rupture (HCC)  Repaired April 2024  Follows with Cardiology       Plan:  RTO 4-6 months  Obtain blood work and urine prior to appointment  Continue scheduled medications  Start low phosphorus    History of Present Illness   HPI  Sourav Saul is a 60 y.o. male who presents to [facility] Nephrology office for a follow up. Patient follows with Dr. Truong. Last seen in office in March 2025.     In office today and is feeling okay. Continues to go to cardiac rehab every other day. No changes in health, illnesses, or further hospitalizations at this time. Has some SOB with exertion. Denies CP. Patient c/o frequency issues with voiding, due to see urology tomorrow. Euvolemic on exam. Declined kidney smart class, book given as well as low phosphorus hand out education. Kidney function stable at this time. Reviewed plan with patient, agreeable.     History obtained from: patient    Review of Systems   Constitutional:  Negative for appetite change, chills and fever.   Respiratory:  Positive for shortness of breath.         SOB with exertion   Cardiovascular:  Negative for chest pain, palpitations and leg swelling.   Gastrointestinal:  Positive for constipation. Negative for abdominal pain, diarrhea, nausea and vomiting.   Genitourinary:  Positive for frequency. Negative for difficulty urinating, dysuria, flank pain and hematuria.   Musculoskeletal:  Negative for back pain and neck pain.   Skin:  Negative for color change.   Neurological:  Negative for dizziness, light-headedness and headaches.     Medications Ordered Prior to Encounter[1]      Objective   /80 (BP Location: Left arm, Patient Position: Sitting, Cuff Size: Adult)   Pulse 67   Ht 5' 10\" (1.778 m)   Wt 99.3 kg (219 lb)   SpO2 96%   BMI 31.42 kg/m²      Physical Exam  Vitals reviewed.   Constitutional:       General: He is not in acute distress.    Cardiovascular:      Rate and Rhythm: " Normal rate.   Pulmonary:      Effort: Pulmonary effort is normal. No respiratory distress.      Breath sounds: Normal breath sounds. No wheezing or rales.   Abdominal:      General: There is no distension.      Palpations: Abdomen is soft.      Tenderness: There is no abdominal tenderness.     Musculoskeletal:      Right lower leg: No edema.      Left lower leg: No edema.     Skin:     General: Skin is warm and dry.     Neurological:      Mental Status: He is alert and oriented to person, place, and time. Mental status is at baseline.                [1]   Current Outpatient Medications on File Prior to Visit   Medication Sig Dispense Refill    amLODIPine (NORVASC) 2.5 mg tablet       aspirin 81 mg chewable tablet Chew 1 tablet (81 mg total) daily      atorvastatin (LIPITOR) 40 mg tablet Take 1 tablet (40 mg total) by mouth daily 90 tablet 4    carvedilol (COREG) 12.5 mg tablet       Multiple Vitamin (Multivitamin Adult) TABS Take by mouth      oxybutynin (DITROPAN-XL) 10 MG 24 hr tablet Take 1 tablet (10 mg total) by mouth daily at bedtime 90 tablet 3    tamsulosin (FLOMAX) 0.4 mg Take 2 capsules (0.8 mg total) by mouth daily with dinner 60 capsule 0    clopidogrel (PLAVIX) 75 mg tablet TAKE 1 TABLET BY MOUTH DAILY FOR GRAFT PATENTCY (Patient not taking: Reported on 3/24/2025)      methocarbamol (ROBAXIN) 500 mg tablet Take 1 tablet (500 mg total) by mouth every 6 (six) hours (Patient not taking: Reported on 4/29/2024) 30 tablet 0    nitroglycerin (NITROSTAT) 0.4 mg SL tablet TAKE 1 TABLET BY MOUTH EVERY 5 MINUTES FOR CHEST PAIN UP TO 3 TIMES AS NEEDED FOR CHEST PAIN (Patient not taking: Reported on 3/3/2025)      oxyCODONE (ROXICODONE) 5 immediate release tablet 1/2 tablet (dose 2.5mg) Orally q6h prn severe pain (Patient not taking: Reported on 3/3/2025)      pantoprazole (PROTONIX) 40 mg tablet every 24 hours (Patient not taking: Reported on 3/24/2025)       No current facility-administered medications on file  prior to visit.

## 2025-07-22 ENCOUNTER — OFFICE VISIT (OUTPATIENT)
Dept: NEPHROLOGY | Facility: HOSPITAL | Age: 60
End: 2025-07-22
Payer: COMMERCIAL

## 2025-07-22 VITALS
WEIGHT: 219 LBS | HEART RATE: 67 BPM | SYSTOLIC BLOOD PRESSURE: 126 MMHG | HEIGHT: 70 IN | OXYGEN SATURATION: 96 % | DIASTOLIC BLOOD PRESSURE: 80 MMHG | BODY MASS INDEX: 31.35 KG/M2

## 2025-07-22 DIAGNOSIS — N18.9 CHRONIC KIDNEY DISEASE-MINERAL AND BONE DISORDER (CKD-MBD): ICD-10-CM

## 2025-07-22 DIAGNOSIS — I71.43 INFRARENAL ABDOMINAL AORTIC ANEURYSM (AAA) WITHOUT RUPTURE (HCC): ICD-10-CM

## 2025-07-22 DIAGNOSIS — D64.9 ANEMIA, UNSPECIFIED TYPE: ICD-10-CM

## 2025-07-22 DIAGNOSIS — Z72.0 TOBACCO USE: ICD-10-CM

## 2025-07-22 DIAGNOSIS — M89.9 CHRONIC KIDNEY DISEASE-MINERAL AND BONE DISORDER (CKD-MBD): ICD-10-CM

## 2025-07-22 DIAGNOSIS — I10 PRIMARY HYPERTENSION: ICD-10-CM

## 2025-07-22 DIAGNOSIS — N18.32 STAGE 3B CHRONIC KIDNEY DISEASE (HCC): Primary | ICD-10-CM

## 2025-07-22 DIAGNOSIS — E83.9 CHRONIC KIDNEY DISEASE-MINERAL AND BONE DISORDER (CKD-MBD): ICD-10-CM

## 2025-07-22 PROCEDURE — 99214 OFFICE O/P EST MOD 30 MIN: CPT

## 2025-07-22 NOTE — PROGRESS NOTES
Name: Sourav Saul      : 1965      MRN: [id number]  Encounter Provider: Eveline Iyer PA-C  Encounter Date: 2025   Encounter department: [facility] UROLOGY [place]  Assessment & Plan  Stage 3b chronic kidney disease (HCC)  Lab Results   Component Value Date    EGFR 34 2025    EGFR 33 (L) 2025    EGFR 32 2024    CREATININE 2.04 (H) 2025    CREATININE 2.14 2025    CREATININE 2.21 (H) 2025   Creatinine remains overall stable   Follows with nephrology     Microscopic hematuria  Renal bladder ultrasound 24  - No hydronephrosis. No visualized renal calculi. Incidental note is made of a 7.6 cm distal abdominal aortic aneurysm.  S/p open repair of abdominal aortic aneurysm 24 by vascular surgery   Cystoscopy 24 by Dr. Sheikh -- The urethra, prostatic urethra, and bladder were all thoroughly inspected there was no evidence of mucosal abnormalities or lesions   Urine studies 2025 - negative for occult blood   Urine dip -- unable to provide sample today in office   Continue to monitor - no intervention needed for time being     Tobacco use  Still smoking a few cigarettes per day  Recommend cessation -- not interested    Benign prostatic hyperplasia with nocturia  PVR 0 mL today in office - no retention   Continue taking flomax 0.8 mg daily as prescribed   Previously on Ditropan XL 10 mg --> reports ongoing nocturia 4-5x nightly   Recommend transitioning to Sanctura 20 mg BID   Patient prefers to AVOID surgical intervention if possible  Encouraged increased hydration and avoidance of bladder irritants / constipation   Continue to monitor on an annual basis     Orders:    tamsulosin (FLOMAX) 0.4 mg; Take 2 capsules (0.8 mg total) by mouth daily with dinner    trospium chloride (SANCTURA) 20 mg tablet; Take 1 tablet (20 mg total) by mouth 2 (two) times a day      History of Present Illness   Sourav Saul is a 60 y.o. male who presents  to the office in annual follow-up to rediscuss his urinary pattern.  The patient continues taking Flomax 0.8 mg daily as prescribed.  PVR 0 mL today in office.  He was previously initiated on Ditropan 10 mg daily at last visit for his ongoing nocturia.  The patient reports he is still going 4-5 times a night and is interested in trying a different medication.  Recommend transitioning to Sanctura 20 mg twice daily to see if this medication offers better results.  The patient was previously worked up for microscopic hematuria and underwent cystoscopy 5/21/2024 by Dr. Sheikh.  Thankfully, no concerning findings were visualized at that time.  Most recent urine studies from March 2025 returned negative for infection and blood.  He does continue utilizing tobacco and is not interested in quitting at this time.  The patient prefers to avoid surgical intervention if possible -- Dr. Sheikh did not previously comment on the patient's prostatic size during cystoscopy. We will plan to follow-up with the patient in 6 months to recheck his urinary pattern.    Of note -- most recent PSA 7/17/25 stable at 1.605    Review of Systems   Constitutional:  Negative for activity change, chills, fatigue and fever.   Respiratory:  Negative for apnea, cough and shortness of breath.    Cardiovascular:  Negative for chest pain and leg swelling.   Gastrointestinal:  Negative for abdominal distention, abdominal pain, constipation and diarrhea.   Genitourinary:  Positive for frequency. Negative for decreased urine volume, difficulty urinating, dysuria, flank pain, hematuria, penile pain, testicular pain and urgency.        Nocturia 4-5x   Neurological:  Negative for dizziness and headaches.   Psychiatric/Behavioral: Negative.       Medical History Reviewed by provider this encounter:  Tobacco  Allergies  Meds  Problems  Med Hx  Surg Hx  Fam Hx     .  Medications Ordered Prior to Encounter[1]   Social History[2]     Objective   BP  "136/84 (BP Location: Left arm, Patient Position: Sitting, Cuff Size: Adult)   Pulse 66   Ht 5' 10\" (1.778 m)   Wt 99.4 kg (219 lb 3.2 oz)   SpO2 98%   BMI 31.45 kg/m²     Physical Exam  Vitals and nursing note reviewed.   Constitutional:       General: He is not in acute distress.     Appearance: Normal appearance. He is well-developed. He is not ill-appearing.   HENT:      Head: Normocephalic and atraumatic.     Eyes:      Conjunctiva/sclera: Conjunctivae normal.       Cardiovascular:      Rate and Rhythm: Normal rate and regular rhythm.      Heart sounds: No murmur heard.  Pulmonary:      Effort: Pulmonary effort is normal. No respiratory distress.      Breath sounds: Normal breath sounds.   Abdominal:      General: Abdomen is flat. There is no distension.      Palpations: Abdomen is soft.      Tenderness: There is no abdominal tenderness.     Musculoskeletal:         General: No swelling.      Cervical back: Neck supple.     Skin:     General: Skin is warm and dry.      Capillary Refill: Capillary refill takes less than 2 seconds.     Neurological:      Mental Status: He is alert and oriented to person, place, and time.     Psychiatric:         Mood and Affect: Mood normal.           Results   Lab Results   Component Value Date    PSA 1.605 07/17/2025    PSA 3.35 02/14/2024     Lab Results   Component Value Date    GLUCOSE 141 (H) 04/05/2024    CALCIUM 9.2 07/18/2025    K 4.4 07/18/2025    CO2 27 07/18/2025     07/18/2025    BUN 21 07/18/2025    CREATININE 2.04 (H) 07/18/2025     Lab Results   Component Value Date    WBC 10.72 (H) 07/18/2025    HGB 14.0 07/18/2025    HCT 41.8 07/18/2025    MCV 82 07/18/2025     07/18/2025       Office Urine Dip  Recent Results (from the past hour)   POCT Measure PVR    Collection Time: 07/23/25  9:54 AM   Result Value Ref Range    POST-VOID RESIDUAL VOLUME, ML POC 0 mL     Radiology Results Review : I have reviewed images/report studies in PACS as described " above (in the HPI).             [1]   Current Outpatient Medications on File Prior to Visit   Medication Sig Dispense Refill    amLODIPine (NORVASC) 2.5 mg tablet       aspirin 81 mg chewable tablet Chew 1 tablet (81 mg total) daily      atorvastatin (LIPITOR) 40 mg tablet Take 1 tablet (40 mg total) by mouth daily 90 tablet 4    carvedilol (COREG) 12.5 mg tablet       carvedilol (COREG) 25 mg tablet TAKE 1/2 TABLET WITH FOOD (DOSE 12.5MG) ORALLY TWICE A DAY 90 DAYS      Multiple Vitamin (Multivitamin Adult) TABS Take by mouth      [DISCONTINUED] oxybutynin (DITROPAN-XL) 10 MG 24 hr tablet Take 1 tablet (10 mg total) by mouth daily at bedtime 90 tablet 3    [DISCONTINUED] tamsulosin (FLOMAX) 0.4 mg Take 2 capsules (0.8 mg total) by mouth daily with dinner 60 capsule 0    oxyCODONE (ROXICODONE) 5 immediate release tablet 1/2 tablet (dose 2.5mg) Orally q6h prn severe pain (Patient not taking: Reported on 3/3/2025)      [DISCONTINUED] clopidogrel (PLAVIX) 75 mg tablet TAKE 1 TABLET BY MOUTH DAILY FOR GRAFT PATENTCY (Patient not taking: Reported on 3/24/2025)      [DISCONTINUED] methocarbamol (ROBAXIN) 500 mg tablet Take 1 tablet (500 mg total) by mouth every 6 (six) hours (Patient not taking: Reported on 4/29/2024) 30 tablet 0    [DISCONTINUED] nitroglycerin (NITROSTAT) 0.4 mg SL tablet TAKE 1 TABLET BY MOUTH EVERY 5 MINUTES FOR CHEST PAIN UP TO 3 TIMES AS NEEDED FOR CHEST PAIN (Patient not taking: Reported on 3/3/2025)      [DISCONTINUED] pantoprazole (PROTONIX) 40 mg tablet every 24 hours (Patient not taking: Reported on 3/24/2025)       No current facility-administered medications on file prior to visit.   [2]   Social History  Tobacco Use    Smoking status: Every Day     Current packs/day: 0.25     Types: Cigarettes    Smokeless tobacco: Never    Tobacco comments:     Quit smoking 3/5   Vaping Use    Vaping status: Never Used   Substance and Sexual Activity    Alcohol use: Not Currently     Comment: I don't drink  alcohol    Drug use: Never    Sexual activity: Not Currently     Partners: Female     Birth control/protection: None

## 2025-07-22 NOTE — ASSESSMENT & PLAN NOTE
Current blood pressure 126/80  Does not check blood pressure at home gets is frequently checked at cardiac rehab every other day  Volume status euvolemic  On Amlodipine 2.5 mg daily, carvedilol 12.5 mg BID , Flomax 0.8 mg daily in mary  Low salt/sodium diet

## 2025-07-22 NOTE — ASSESSMENT & PLAN NOTE
Renal bladder ultrasound 2/6/24  - No hydronephrosis. No visualized renal calculi. Incidental note is made of a 7.6 cm distal abdominal aortic aneurysm.  S/p open repair of abdominal aortic aneurysm 4/5/24 by vascular surgery   Cystoscopy 5/21/24 by Dr. Sheikh -- The urethra, prostatic urethra, and bladder were all thoroughly inspected there was no evidence of mucosal abnormalities or lesions   Urine studies March 2025 - negative for occult blood   Urine dip -- unable to provide sample today in office   Continue to monitor - no intervention needed for time being

## 2025-07-22 NOTE — ASSESSMENT & PLAN NOTE
Current creatinine with most recent labs reviewed 2.04 with eGFR 34.  Baseline creatinine appears to be between 2.0-2.2 since 2024 with peak of 2.61 in April 2024  Renal US, 2/2024: No hydronephrosis, no visualized renal calculi, found incidental 7.4 AAA  CT C/A/P w/wo, 2/2024: Kidneys/Ureters: No solid enhancing renal mass. Mild left renal cortical atrophy. Dominant left interpolar region cortical cyst. No hydronephrosis or calculi   Not on ACEi/ARB/SGLT2i  Likely to hx of hypertensive nephrosclerosis  UA, 3/2025: [place]  UPCR: 0.1  Hemoglobin stable  Microalbumin: 13.0  Kidney Smart Class - Not taken. Book given to patient  Avoid NSAIDS - does not take per patient. Okay tylenol okay to take  Adequate hydration with water    Orders:    Basic metabolic panel; Future    CBC; Future    Urinalysis with microscopic; Future    Protein / creatinine ratio, urine; Future    Albumin / creatinine urine ratio; Future

## 2025-07-22 NOTE — ASSESSMENT & PLAN NOTE
Lab Results   Component Value Date    EGFR 34 07/18/2025    EGFR 33 (L) 03/26/2025    EGFR 32 09/20/2024    CREATININE 2.04 (H) 07/18/2025    CREATININE 2.14 04/07/2025    CREATININE 2.21 (H) 03/26/2025   Creatinine remains overall stable   Follows with nephrology

## 2025-07-22 NOTE — ASSESSMENT & PLAN NOTE
*** with urinary pattern   PVR 0 mL today in office - no retention   Continue taking flomax 0.8 mg daily as prescribed   Previously on Ditropan XL 10 mg --> reports ongoing nocturia 4-5x nightly   Recommend transitioning to ***   Encouraged increased hydration and avoidance of bladder irritants / constipation   Continue to monitor on an annual basis

## 2025-07-22 NOTE — PATIENT INSTRUCTIONS
It was a please seeing you in office today.  Questions addressed and answered in office  Avoid NSAIDS - which include Ibuprofen, Motrin, Advil, Aleve, Naprosyn, Naproxen, Mobic, and Celebrex  Continue with low sodium diet < 2g/d  Continue to check your blood pressure at home  Continue with daily weights at home. Please contact office if you experience 3lb weight gain in one day or 5lbs in a week  Please contact office if you have any questions or concerns at [phone]  Will have you follow up in office again in   Please obtain labs and urine prior to appointment

## 2025-07-22 NOTE — ASSESSMENT & PLAN NOTE
Still smoking - smokes about 3-4 cigarettes a day. Previously quit smoking  Recommend cessation

## 2025-07-23 ENCOUNTER — OFFICE VISIT (OUTPATIENT)
Dept: UROLOGY | Facility: HOSPITAL | Age: 60
End: 2025-07-23
Payer: COMMERCIAL

## 2025-07-23 ENCOUNTER — CLINICAL SUPPORT (OUTPATIENT)
Age: 60
End: 2025-07-23
Payer: COMMERCIAL

## 2025-07-23 VITALS
WEIGHT: 219.2 LBS | SYSTOLIC BLOOD PRESSURE: 136 MMHG | HEART RATE: 66 BPM | HEIGHT: 70 IN | OXYGEN SATURATION: 98 % | BODY MASS INDEX: 31.38 KG/M2 | DIASTOLIC BLOOD PRESSURE: 84 MMHG

## 2025-07-23 DIAGNOSIS — Z72.0 TOBACCO USE: ICD-10-CM

## 2025-07-23 DIAGNOSIS — R31.29 MICROSCOPIC HEMATURIA: ICD-10-CM

## 2025-07-23 DIAGNOSIS — N40.1 BENIGN PROSTATIC HYPERPLASIA WITH NOCTURIA: Primary | ICD-10-CM

## 2025-07-23 DIAGNOSIS — R35.1 BENIGN PROSTATIC HYPERPLASIA WITH NOCTURIA: Primary | ICD-10-CM

## 2025-07-23 DIAGNOSIS — N40.0 BENIGN PROSTATIC HYPERPLASIA, UNSPECIFIED WHETHER LOWER URINARY TRACT SYMPTOMS PRESENT: ICD-10-CM

## 2025-07-23 DIAGNOSIS — N18.32 STAGE 3B CHRONIC KIDNEY DISEASE (HCC): ICD-10-CM

## 2025-07-23 DIAGNOSIS — Z95.1 S/P CABG (CORONARY ARTERY BYPASS GRAFT): Primary | ICD-10-CM

## 2025-07-23 LAB — POST-VOID RESIDUAL VOLUME, ML POC: 0 ML

## 2025-07-23 PROCEDURE — 99214 OFFICE O/P EST MOD 30 MIN: CPT

## 2025-07-23 PROCEDURE — 51798 US URINE CAPACITY MEASURE: CPT

## 2025-07-23 PROCEDURE — 93798 PHYS/QHP OP CAR RHAB W/ECG: CPT

## 2025-07-23 RX ORDER — CARVEDILOL 25 MG/1
TABLET ORAL
COMMUNITY
Start: 2025-06-01

## 2025-07-23 RX ORDER — TROSPIUM CHLORIDE 20 MG/1
20 TABLET, FILM COATED ORAL 2 TIMES DAILY
Qty: 60 TABLET | Refills: 0 | Status: SHIPPED | OUTPATIENT
Start: 2025-07-23

## 2025-07-23 RX ORDER — TAMSULOSIN HYDROCHLORIDE 0.4 MG/1
0.8 CAPSULE ORAL
Qty: 60 CAPSULE | Refills: 3 | Status: SHIPPED | OUTPATIENT
Start: 2025-07-23

## 2025-07-23 NOTE — ASSESSMENT & PLAN NOTE
PVR 0 mL today in office - no retention   Continue taking flomax 0.8 mg daily as prescribed   Previously on Ditropan XL 10 mg --> reports ongoing nocturia 4-5x nightly   Recommend transitioning to Sanctura 20 mg BID   Patient prefers to AVOID surgical intervention if possible  Encouraged increased hydration and avoidance of bladder irritants / constipation   Continue to monitor on an annual basis     Orders:    tamsulosin (FLOMAX) 0.4 mg; Take 2 capsules (0.8 mg total) by mouth daily with dinner    trospium chloride (SANCTURA) 20 mg tablet; Take 1 tablet (20 mg total) by mouth 2 (two) times a day

## 2025-07-25 ENCOUNTER — CLINICAL SUPPORT (OUTPATIENT)
Age: 60
End: 2025-07-25
Payer: COMMERCIAL

## 2025-07-25 DIAGNOSIS — Z95.1 S/P CABG (CORONARY ARTERY BYPASS GRAFT): Primary | ICD-10-CM

## 2025-07-25 PROCEDURE — 93798 PHYS/QHP OP CAR RHAB W/ECG: CPT

## 2025-07-28 ENCOUNTER — PATIENT MESSAGE (OUTPATIENT)
Dept: UROLOGY | Facility: HOSPITAL | Age: 60
End: 2025-07-28

## 2025-07-28 ENCOUNTER — CLINICAL SUPPORT (OUTPATIENT)
Age: 60
End: 2025-07-28
Payer: COMMERCIAL

## 2025-07-28 DIAGNOSIS — Z95.1 S/P CABG (CORONARY ARTERY BYPASS GRAFT): Primary | ICD-10-CM

## 2025-07-28 PROCEDURE — 93798 PHYS/QHP OP CAR RHAB W/ECG: CPT

## 2025-07-30 ENCOUNTER — CLINICAL SUPPORT (OUTPATIENT)
Age: 60
End: 2025-07-30
Payer: COMMERCIAL

## 2025-07-30 DIAGNOSIS — Z95.1 S/P CABG (CORONARY ARTERY BYPASS GRAFT): Primary | ICD-10-CM

## 2025-07-30 PROCEDURE — 93798 PHYS/QHP OP CAR RHAB W/ECG: CPT

## 2025-07-31 DIAGNOSIS — R35.0 URINARY FREQUENCY: Primary | ICD-10-CM

## 2025-07-31 RX ORDER — OXYBUTYNIN CHLORIDE 10 MG/1
10 TABLET, EXTENDED RELEASE ORAL
COMMUNITY
End: 2025-07-31 | Stop reason: SDUPTHER

## 2025-07-31 RX ORDER — OXYBUTYNIN CHLORIDE 10 MG/1
10 TABLET, EXTENDED RELEASE ORAL
Qty: 30 TABLET | Refills: 3 | Status: SHIPPED | OUTPATIENT
Start: 2025-07-31

## 2025-08-01 ENCOUNTER — CLINICAL SUPPORT (OUTPATIENT)
Age: 60
End: 2025-08-01
Payer: COMMERCIAL

## 2025-08-01 DIAGNOSIS — Z95.1 S/P CABG (CORONARY ARTERY BYPASS GRAFT): Primary | ICD-10-CM

## 2025-08-01 PROCEDURE — 93798 PHYS/QHP OP CAR RHAB W/ECG: CPT

## 2025-08-04 ENCOUNTER — CLINICAL SUPPORT (OUTPATIENT)
Age: 60
End: 2025-08-04
Payer: COMMERCIAL

## 2025-08-04 DIAGNOSIS — Z95.1 S/P CABG (CORONARY ARTERY BYPASS GRAFT): Primary | ICD-10-CM

## 2025-08-04 PROCEDURE — 93798 PHYS/QHP OP CAR RHAB W/ECG: CPT

## 2025-08-05 ENCOUNTER — HOSPITAL ENCOUNTER (OUTPATIENT)
Dept: NON INVASIVE DIAGNOSTICS | Age: 60
Discharge: HOME | End: 2025-08-05
Payer: COMMERCIAL

## 2025-08-05 DIAGNOSIS — I65.23 ASYMPTOMATIC CAROTID ARTERY STENOSIS, BILATERAL: ICD-10-CM

## 2025-08-05 PROCEDURE — 93880 EXTRACRANIAL BILAT STUDY: CPT | Performed by: SURGERY

## 2025-08-05 PROCEDURE — 93880 EXTRACRANIAL BILAT STUDY: CPT

## 2025-08-18 ENCOUNTER — OFFICE VISIT (OUTPATIENT)
Dept: VASCULAR SURGERY | Facility: CLINIC | Age: 60
End: 2025-08-18
Payer: COMMERCIAL

## 2025-08-18 VITALS
WEIGHT: 219 LBS | BODY MASS INDEX: 31.35 KG/M2 | OXYGEN SATURATION: 97 % | HEART RATE: 68 BPM | DIASTOLIC BLOOD PRESSURE: 80 MMHG | SYSTOLIC BLOOD PRESSURE: 124 MMHG | HEIGHT: 70 IN

## 2025-08-18 DIAGNOSIS — Z72.0 TOBACCO USE: ICD-10-CM

## 2025-08-18 DIAGNOSIS — I72.3 ILIAC ARTERY ANEURYSM, BILATERAL (HCC): ICD-10-CM

## 2025-08-18 DIAGNOSIS — E78.2 MIXED HYPERLIPIDEMIA: ICD-10-CM

## 2025-08-18 DIAGNOSIS — I72.4 POPLITEAL ARTERY ANEURYSM, BILATERAL (HCC): ICD-10-CM

## 2025-08-18 DIAGNOSIS — I25.10 CORONARY ARTERY DISEASE INVOLVING NATIVE CORONARY ARTERY OF NATIVE HEART WITHOUT ANGINA PECTORIS: ICD-10-CM

## 2025-08-18 DIAGNOSIS — I71.43 INFRARENAL ABDOMINAL AORTIC ANEURYSM (AAA) WITHOUT RUPTURE (HCC): Primary | ICD-10-CM

## 2025-08-18 DIAGNOSIS — Z95.1 HX OF CABG: ICD-10-CM

## 2025-08-18 DIAGNOSIS — I65.23 ASYMPTOMATIC CAROTID ARTERY STENOSIS, BILATERAL: ICD-10-CM

## 2025-08-18 PROCEDURE — 99214 OFFICE O/P EST MOD 30 MIN: CPT | Performed by: SURGERY

## (undated) DEVICE — GLOVE SRG BIOGEL ECLIPSE 6

## (undated) DEVICE — TRAY FOLEY 16FR URIMETER SURESTEP

## (undated) DEVICE — VESSEL LOOPS X-RAY DETECTABLE: Brand: DEROYAL

## (undated) DEVICE — DECANTER: Brand: UNBRANDED

## (undated) DEVICE — TOWEL SURG XR DETECT GREEN STRL RFD

## (undated) DEVICE — SUT PROLENE 4-0 SH 36 IN 8521H

## (undated) DEVICE — SUT SILK 2-0 SH 30 IN K833H

## (undated) DEVICE — SUT PROLENE 3-0 V-7 36 IN 8976H

## (undated) DEVICE — GLOVE INDICATOR PI UNDERGLOVE SZ 6.5 BLUE

## (undated) DEVICE — SUT CHROMIC 2-0 MH 27 IN G127H

## (undated) DEVICE — PRUITT OCCLUSION CATHETER, 3F: Brand: PRUITT OCCLUSION CATHETER

## (undated) DEVICE — DRESSING MEPILEX AG BORDER POST-OP 4 X 8 IN

## (undated) DEVICE — PROXIMATE SKIN STAPLERS (35 WIDE) CONTAINS 35 STAINLESS STEEL STAPLES (FIXED HEAD): Brand: PROXIMATE

## (undated) DEVICE — SUT SILK 0 30 IN A306H

## (undated) DEVICE — HEMOSTATIC MATRIX SURGIFLO 8ML W/THROMBIN

## (undated) DEVICE — SUT PROLENE 4-0 RB-1/RB-1 36 IN 8557H

## (undated) DEVICE — LIGACLIP MCA MULTIPLE CLIP APPLIERS, 20 SMALL CLIPS: Brand: LIGACLIP

## (undated) DEVICE — LIGACLIP MCA MULTIPLE CLIP APPLIERS, 20 MEDIUM CLIPS: Brand: LIGACLIP

## (undated) DEVICE — DRESSING MEPILEX AG BORDER POST-OP 4 X 12 IN

## (undated) DEVICE — SUT SILK 2-0 18 IN A185H

## (undated) DEVICE — PACK PBDS AORTIC ANEURYSM RF

## (undated) DEVICE — PETRI DISH STERILE

## (undated) DEVICE — SURGICEL 4 X 8IN

## (undated) DEVICE — LIGACLIP MCA MULTIPLE CLIP APPLIERS, 20 LARGE CLIPS: Brand: LIGACLIP

## (undated) DEVICE — FISH VISCERAL RETAINER LG

## (undated) DEVICE — SUT PROLENE 5-0 C-1/C-1 36 IN 8720H

## (undated) DEVICE — SUT PDS II 1 XLH 96 IN LOOPED Z881G

## (undated) DEVICE — SUT PROLENE 3-0 V-7 60 IN D9721